# Patient Record
Sex: FEMALE | Race: WHITE | NOT HISPANIC OR LATINO
[De-identification: names, ages, dates, MRNs, and addresses within clinical notes are randomized per-mention and may not be internally consistent; named-entity substitution may affect disease eponyms.]

---

## 2020-07-13 PROBLEM — Z00.00 ENCOUNTER FOR PREVENTIVE HEALTH EXAMINATION: Status: ACTIVE | Noted: 2020-07-13

## 2020-07-14 ENCOUNTER — APPOINTMENT (OUTPATIENT)
Dept: DISASTER EMERGENCY | Facility: CLINIC | Age: 75
End: 2020-07-14

## 2020-07-15 VITALS
DIASTOLIC BLOOD PRESSURE: 79 MMHG | OXYGEN SATURATION: 97 % | HEART RATE: 67 BPM | RESPIRATION RATE: 16 BRPM | SYSTOLIC BLOOD PRESSURE: 166 MMHG | TEMPERATURE: 97 F

## 2020-07-15 LAB — SARS-COV-2 N GENE NPH QL NAA+PROBE: NOT DETECTED

## 2020-07-15 NOTE — H&P ADULT - NSHPLABSRESULTS_GEN_ALL_CORE
14.5   6.94  )-----------( 307      ( 17 Jul 2020 09:58 )             44.9       07-17    143  |  101  |  16  ----------------------------<  271<H>  4.0   |  28  |  0.54    Ca    10.0      17 Jul 2020 09:58    TPro  7.9  /  Alb  4.3  /  TBili  0.4  /  DBili  x   /  AST  19  /  ALT  22  /  AlkPhos  171<H>  07-17      PT/INR - ( 17 Jul 2020 09:58 )   PT: 11.6 sec;   INR: 0.97          PTT - ( 17 Jul 2020 09:58 )  PTT:31.5 sec    CARDIAC MARKERS ( 17 Jul 2020 09:58 )  x     / x     / 207 U/L / x     / 3.5 ng/mL            EKG: NSR with Sinus Arrhythmia at 67 bpm. 0.5-1 mm J point elevation V2-V5. No T wave changes.

## 2020-07-15 NOTE — H&P ADULT - ASSESSMENT
76 yo F POOR HISTORIAN with PMHx known 3VCAD (diagnostic cath 8/2019 @ [place]), HTN, HLD, Uncontrolled Insulin Dependent DM II (Hgb A1C 11.8% on 7/17/2020), ? Anemia (hgb 9.8 8/2019 s/p 2 units PRBCs 8/2019), PAD s/p LSFA/Popliteal/TP trunk and Posterior Tibial PTA and stent 8/2019  (s/p peripheral angiogram @ [place] 5/2020 with "ballooning and stenting" per patient- on Eliquis (last dose "few days ago" as patient ran out of medication) ,  ? Osteomyelitis s/p AKA LLE 11/2019 who presents for peripheral angiogram due to patient's risk factors, known 3V CAD, and CCS Angina Class III Equivalent Symptoms.     ASA III               Mallampati III     Risks & benefits of procedure and alternative therapy have been explained to the patient including but not limited to: allergic reaction, bleeding w/possible need for blood transfusion, infection, renal and vascular compromise, limb damage, arrhythmia, stroke, vessel dissection/perforation, Myocardial infarction, emergent CABG. Informed consent obtained and in chart.     Hgb/HCT 14.5/44.9 -normal. Patient denies bleeding, BRBPR, melena, hematuria, hematemesis. ASA  mg PO x 1 given prior to procedure. 76 yo F POOR HISTORIAN with PMHx known 3VCAD (diagnostic cath 8/2019 @ [place]), HTN, HLD, Uncontrolled Insulin Dependent DM II (Hgb A1C 11.8% on 7/17/2020), ? Anemia (hgb 9.8 8/2019 s/p 2 units PRBCs 8/2019), PAD s/p LSFA/Popliteal/TP trunk and Posterior Tibial PTA and stent 8/2019  (s/p peripheral angiogram @ [place] 5/2020 with "ballooning and stenting" per patient- on Eliquis (last dose "few days ago" as patient ran out of medication) ,  ? Osteomyelitis s/p AKA LLE 11/2019 who presents for peripheral angiogram due to patient's risk factors, known 3V CAD, and CCS Angina Class III Equivalent Symptoms.     ASA III               Mallampati III     Risks & benefits of procedure and alternative therapy have been explained to the patient including but not limited to: allergic reaction, bleeding w/possible need for blood transfusion, infection, renal and vascular compromise, limb damage, arrhythmia, stroke, vessel dissection/perforation, Myocardial infarction, emergent CABG. Informed consent obtained and in chart.     Hgb/HCT 14.5/44.9 -normal. Patient denies bleeding, BRBPR, melena, hematuria, hematemesis. ASA  mg PO x 1 given prior to procedure. No Plavix load pre-cath as patient likely for CABG given known 3V CAD and uncontrolled Insulin Dependent DM Type II

## 2020-07-15 NOTE — H&P ADULT - HISTORY OF PRESENT ILLNESS
COVID testing [facility] 7/14 Results Negative in HIE  Cardiologist: Dr. Gene Steiner , Dr. Sandra  Pharmacy: [facility] on [address] [facility]  Escort: [employer] transportation    76 yo F PMHx known 3VCAD (diagnostic cath 8/2019 @ [place]), HTN, HLD, DM II, anemia? (hgb 9.8 8/2019 s/p 2 units PRBCs 8/2019), PAD (s/p peripheral angiogram @ [facility] 5/2020 with "ballooning and stenting") ?osteomyelitis ("infection" s/p amputation of LLE above the knee presenting to Dr. Sandra for routine follow up. She does not report any chest pain, SOB, palpitations, dizziness, syncope, orthopnea. Does however report RLE dependent edema.     In light of pt's risk factors, known 3VCAD, she now presents to [facility] for cardiac catheterization with possible intervention if clinically indicated.     Cardiac cath @ [place] 8/2019: LM normal. RCA prox mild diffuse disease mid 70-80%. MAD prox mild disease, mid 60-70%, D1 70-80%. LCX prox 70-80%. EF 60%.    Peripheral angiogram 8/2019: LLE angio. Left SFA/poploteal?TOP trunk and PT artery balloon angioplasty and stenting. COVID testing [facility] 7/14 Results Negative in HIE  Cardiologist: Dr. Gene Steiner , Dr. Sandra  Pharmacy: [facility] on [address] [facility]  Escort:  transportation  Phone call cut off - pt may be on Eliquis. Voicemail left with instructions to hold Eliquis up until cath.     74 yo F PMHx known 3VCAD (diagnostic cath 8/2019 @ [place]), HTN, HLD, DM II, anemia? (hgb 9.8 8/2019 s/p 2 units PRBCs 8/2019), PAD (s/p peripheral angiogram @ [facility] 5/2020 with "ballooning and stenting") ?osteomyelitis ("infection" s/p amputation of LLE above the knee presenting to Dr. Sandra for routine follow up. She does not report any chest pain, SOB, palpitations, dizziness, syncope, orthopnea. Does however report RLE dependent edema.     In light of pt's risk factors, known 3VCAD, she now presents to [facility] for cardiac catheterization with possible intervention if clinically indicated.     Cardiac cath @ [place] 8/2019: LM normal. RCA prox mild diffuse disease mid 70-80%. MAD prox mild disease, mid 60-70%, D1 70-80%. LCX prox 70-80%. EF 60%.    Peripheral angiogram 8/2019: LLE angio. Left SFA/poploteal?TOP trunk and PT artery balloon angioplasty and stenting. COVID testing [facility] 7/14 Results Negative in HIE  Cardiologist: Dr. Gene Steiner , Dr. Sandra  Pharmacy: [facility] on [address] [facility]  Escort: [employer] transportation  Phone call cut off - pt may be on Eliquis. Voicemail left with instructions to hold Eliquis up until cath.     74 yo F PMHx known 3VCAD (diagnostic cath 8/2019 @ [place]), HTN, HLD, DM II, anemia? (hgb 9.8 8/2019 s/p 2 units PRBCs 8/2019), PAD s/p LSFA/Popliteal/TP trunk and Posterior Tibial PTA and stent (s/p peripheral angiogram @ [place] 5/2020 with "ballooning and stenting") ?osteomyelitis ("infection" s/p amputation of LLE above the knee presenting to Dr. Sandra for routine follow up. She does not report any chest pain, SOB, palpitations, dizziness, syncope, orthopnea. Does however report RLE dependent edema.     In light of pt's risk factors, known 3VCAD, she now presents to [facility] for cardiac catheterization with possible intervention if clinically indicated.     Cardiac cath @ [place] 8/2019: LM normal. RCA prox mild diffuse disease mid 70-80%. LAD prox mild disease, mid 60-70%, D1 70-80%. LCX prox 70-80%. EF 60%.    Peripheral angiogram 8/2019: LLE angio. Left SFA/popliteal?TP trunk and PT artery balloon angioplasty and stenting. COVID testing [facility] 7/14 Results Negative in HIE  Cardiologist: Dr. Gene Steiner , Dr. Sandra  Pharmacy: [facility] on [address] [facility]  Escort: [employer] transportation    74 yo F POOR HISTORIAN with PMHx known 3VCAD (diagnostic cath 8/2019 @ [place]), HTN, HLD, Uncontrolled Insulin Dependent DM II (Hgb A1C 11.8% on 7/17/2020), ? Anemia (hgb 9.8 8/2019 s/p 2 units PRBCs 8/2019), PAD s/p LSFA/Popliteal/TP trunk and Posterior Tibial PTA and stent 8/2019  (s/p peripheral angiogram @ [place] 5/2020 with "ballooning and stenting" per patient- on Eliquis (last dose "few days ago" as patient ran out of medication) ,  ? Osteomyelitis s/p AKA LLE 11/2019 who presented to cardiologist for follow-up. Patient reports SOB after climbing 1-2 flights of stairs resolved with rest ( CCS Angina Class III Equivalent Symptoms). She denies chest pain, palpitations, dizziness, syncope, orthopnea, PND. Does however report RLE dependent edema. Upon further questioning she denies fever, chills, cough, contact with known sick contacts or individuals with Covid 19, recent travel, loss of taste or smell.     In light of pt's risk factors, known 3VCAD, CCS Angina Class III Equivalent Symptoms she now presents to [facility] for cardiac catheterization with possible intervention if clinically indicated.     Cardiac cath @ [place] 8/2019: LM normal. RCA prox mild diffuse disease mid 70-80%. LAD prox mild disease, mid 60-70%, D1 70-80%. LCX prox 70-80%. EF 60%.    Peripheral angiogram 8/2019: LLE angio. Left SFA/popliteal?TP trunk and PT artery balloon angioplasty and stenting.

## 2020-07-15 NOTE — H&P ADULT - NSICDXPASTMEDICALHX_GEN_ALL_CORE_FT
PAST MEDICAL HISTORY:  CAD (coronary artery disease)     Diabetes mellitus     History of osteomyelitis     HLD (hyperlipidemia)     HTN (hypertension)     PAD (peripheral artery disease)

## 2020-07-17 ENCOUNTER — RESULT REVIEW (OUTPATIENT)
Age: 75
End: 2020-07-17

## 2020-07-17 ENCOUNTER — OUTPATIENT (OUTPATIENT)
Dept: OUTPATIENT SERVICES | Facility: HOSPITAL | Age: 75
LOS: 1 days | Discharge: NURSING, REHAB OR LONG-TERM CARE | End: 2020-07-17
Payer: MEDICARE

## 2020-07-17 VITALS
SYSTOLIC BLOOD PRESSURE: 166 MMHG | RESPIRATION RATE: 16 BRPM | OXYGEN SATURATION: 97 % | HEART RATE: 67 BPM | DIASTOLIC BLOOD PRESSURE: 79 MMHG | TEMPERATURE: 97 F

## 2020-07-17 LAB
A1C WITH ESTIMATED AVERAGE GLUCOSE RESULT: 11.8 % — HIGH (ref 4–5.6)
ALBUMIN SERPL ELPH-MCNC: 4.3 G/DL — SIGNIFICANT CHANGE UP (ref 3.3–5)
ALP SERPL-CCNC: 171 U/L — HIGH (ref 40–120)
ALT FLD-CCNC: 22 U/L — SIGNIFICANT CHANGE UP (ref 10–45)
ANION GAP SERPL CALC-SCNC: 14 MMOL/L — SIGNIFICANT CHANGE UP (ref 5–17)
APPEARANCE UR: CLEAR — SIGNIFICANT CHANGE UP
APTT BLD: 31.5 SEC — SIGNIFICANT CHANGE UP (ref 27.5–35.5)
AST SERPL-CCNC: 19 U/L — SIGNIFICANT CHANGE UP (ref 10–40)
BASOPHILS # BLD AUTO: 0.02 K/UL — SIGNIFICANT CHANGE UP (ref 0–0.2)
BASOPHILS NFR BLD AUTO: 0.3 % — SIGNIFICANT CHANGE UP (ref 0–2)
BILIRUB SERPL-MCNC: 0.4 MG/DL — SIGNIFICANT CHANGE UP (ref 0.2–1.2)
BILIRUB UR-MCNC: NEGATIVE — SIGNIFICANT CHANGE UP
BLD GP AB SCN SERPL QL: NEGATIVE — SIGNIFICANT CHANGE UP
BUN SERPL-MCNC: 16 MG/DL — SIGNIFICANT CHANGE UP (ref 7–23)
CALCIUM SERPL-MCNC: 10 MG/DL — SIGNIFICANT CHANGE UP (ref 8.4–10.5)
CHLORIDE SERPL-SCNC: 101 MMOL/L — SIGNIFICANT CHANGE UP (ref 96–108)
CHOLEST SERPL-MCNC: 172 MG/DL — SIGNIFICANT CHANGE UP (ref 10–199)
CK MB CFR SERPL CALC: 3.5 NG/ML — SIGNIFICANT CHANGE UP (ref 0–6.7)
CK SERPL-CCNC: 207 U/L — HIGH (ref 25–170)
CO2 SERPL-SCNC: 28 MMOL/L — SIGNIFICANT CHANGE UP (ref 22–31)
COLOR SPEC: YELLOW — SIGNIFICANT CHANGE UP
CREAT SERPL-MCNC: 0.54 MG/DL — SIGNIFICANT CHANGE UP (ref 0.5–1.3)
DIFF PNL FLD: ABNORMAL
EOSINOPHIL # BLD AUTO: 0.08 K/UL — SIGNIFICANT CHANGE UP (ref 0–0.5)
EOSINOPHIL NFR BLD AUTO: 1.2 % — SIGNIFICANT CHANGE UP (ref 0–6)
ESTIMATED AVERAGE GLUCOSE: 292 MG/DL — HIGH (ref 68–114)
GLUCOSE BLDC GLUCOMTR-MCNC: 151 MG/DL — HIGH (ref 70–99)
GLUCOSE BLDC GLUCOMTR-MCNC: 242 MG/DL — HIGH (ref 70–99)
GLUCOSE SERPL-MCNC: 271 MG/DL — HIGH (ref 70–99)
GLUCOSE UR QL: NEGATIVE — SIGNIFICANT CHANGE UP
HCT VFR BLD CALC: 44.9 % — SIGNIFICANT CHANGE UP (ref 34.5–45)
HDLC SERPL-MCNC: 50 MG/DL — SIGNIFICANT CHANGE UP
HGB BLD-MCNC: 14.5 G/DL — SIGNIFICANT CHANGE UP (ref 11.5–15.5)
IMM GRANULOCYTES NFR BLD AUTO: 0.3 % — SIGNIFICANT CHANGE UP (ref 0–1.5)
INR BLD: 0.97 — SIGNIFICANT CHANGE UP (ref 0.88–1.16)
KETONES UR-MCNC: NEGATIVE — SIGNIFICANT CHANGE UP
LEUKOCYTE ESTERASE UR-ACNC: NEGATIVE — SIGNIFICANT CHANGE UP
LIPID PNL WITH DIRECT LDL SERPL: 104 MG/DL — HIGH
LYMPHOCYTES # BLD AUTO: 1.65 K/UL — SIGNIFICANT CHANGE UP (ref 1–3.3)
LYMPHOCYTES # BLD AUTO: 23.8 % — SIGNIFICANT CHANGE UP (ref 13–44)
MCHC RBC-ENTMCNC: 26.2 PG — LOW (ref 27–34)
MCHC RBC-ENTMCNC: 32.3 GM/DL — SIGNIFICANT CHANGE UP (ref 32–36)
MCV RBC AUTO: 81.2 FL — SIGNIFICANT CHANGE UP (ref 80–100)
MONOCYTES # BLD AUTO: 0.33 K/UL — SIGNIFICANT CHANGE UP (ref 0–0.9)
MONOCYTES NFR BLD AUTO: 4.8 % — SIGNIFICANT CHANGE UP (ref 2–14)
NEUTROPHILS # BLD AUTO: 4.84 K/UL — SIGNIFICANT CHANGE UP (ref 1.8–7.4)
NEUTROPHILS NFR BLD AUTO: 69.6 % — SIGNIFICANT CHANGE UP (ref 43–77)
NITRITE UR-MCNC: NEGATIVE — SIGNIFICANT CHANGE UP
NRBC # BLD: 0 /100 WBCS — SIGNIFICANT CHANGE UP (ref 0–0)
PH UR: 7 — SIGNIFICANT CHANGE UP (ref 5–8)
PLATELET # BLD AUTO: 307 K/UL — SIGNIFICANT CHANGE UP (ref 150–400)
POTASSIUM SERPL-MCNC: 4 MMOL/L — SIGNIFICANT CHANGE UP (ref 3.5–5.3)
POTASSIUM SERPL-SCNC: 4 MMOL/L — SIGNIFICANT CHANGE UP (ref 3.5–5.3)
PROT SERPL-MCNC: 7.9 G/DL — SIGNIFICANT CHANGE UP (ref 6–8.3)
PROT UR-MCNC: 100 MG/DL
PROTHROM AB SERPL-ACNC: 11.6 SEC — SIGNIFICANT CHANGE UP (ref 10.6–13.6)
RBC # BLD: 5.53 M/UL — HIGH (ref 3.8–5.2)
RBC # FLD: 13.5 % — SIGNIFICANT CHANGE UP (ref 10.3–14.5)
RH IG SCN BLD-IMP: POSITIVE — SIGNIFICANT CHANGE UP
SODIUM SERPL-SCNC: 143 MMOL/L — SIGNIFICANT CHANGE UP (ref 135–145)
SP GR SPEC: 1.01 — SIGNIFICANT CHANGE UP (ref 1–1.03)
TOTAL CHOLESTEROL/HDL RATIO MEASUREMENT: 3.4 RATIO — SIGNIFICANT CHANGE UP (ref 3.3–7.1)
TRIGL SERPL-MCNC: 89 MG/DL — SIGNIFICANT CHANGE UP (ref 10–149)
TSH SERPL-MCNC: 2.24 UIU/ML — SIGNIFICANT CHANGE UP (ref 0.35–4.94)
UROBILINOGEN FLD QL: 0.2 E.U./DL — SIGNIFICANT CHANGE UP
WBC # BLD: 6.94 K/UL — SIGNIFICANT CHANGE UP (ref 3.8–10.5)
WBC # FLD AUTO: 6.94 K/UL — SIGNIFICANT CHANGE UP (ref 3.8–10.5)

## 2020-07-17 PROCEDURE — 93458 L HRT ARTERY/VENTRICLE ANGIO: CPT

## 2020-07-17 PROCEDURE — 83036 HEMOGLOBIN GLYCOSYLATED A1C: CPT

## 2020-07-17 PROCEDURE — 93306 TTE W/DOPPLER COMPLETE: CPT

## 2020-07-17 PROCEDURE — 80061 LIPID PANEL: CPT

## 2020-07-17 PROCEDURE — C1769: CPT

## 2020-07-17 PROCEDURE — 94150 VITAL CAPACITY TEST: CPT

## 2020-07-17 PROCEDURE — 71045 X-RAY EXAM CHEST 1 VIEW: CPT | Mod: 26

## 2020-07-17 PROCEDURE — 84443 ASSAY THYROID STIM HORMONE: CPT

## 2020-07-17 PROCEDURE — 93306 TTE W/DOPPLER COMPLETE: CPT | Mod: 26

## 2020-07-17 PROCEDURE — 71045 X-RAY EXAM CHEST 1 VIEW: CPT

## 2020-07-17 PROCEDURE — C1887: CPT

## 2020-07-17 PROCEDURE — 36415 COLL VENOUS BLD VENIPUNCTURE: CPT

## 2020-07-17 PROCEDURE — 99205 OFFICE O/P NEW HI 60 MIN: CPT

## 2020-07-17 PROCEDURE — 93880 EXTRACRANIAL BILAT STUDY: CPT

## 2020-07-17 PROCEDURE — 82962 GLUCOSE BLOOD TEST: CPT

## 2020-07-17 PROCEDURE — 85730 THROMBOPLASTIN TIME PARTIAL: CPT

## 2020-07-17 PROCEDURE — C1894: CPT

## 2020-07-17 PROCEDURE — 85610 PROTHROMBIN TIME: CPT

## 2020-07-17 PROCEDURE — 81001 URINALYSIS AUTO W/SCOPE: CPT

## 2020-07-17 PROCEDURE — 85025 COMPLETE CBC W/AUTO DIFF WBC: CPT

## 2020-07-17 PROCEDURE — 86850 RBC ANTIBODY SCREEN: CPT

## 2020-07-17 PROCEDURE — 82553 CREATINE MB FRACTION: CPT

## 2020-07-17 PROCEDURE — 80053 COMPREHEN METABOLIC PANEL: CPT

## 2020-07-17 PROCEDURE — 93458 L HRT ARTERY/VENTRICLE ANGIO: CPT | Mod: 26

## 2020-07-17 PROCEDURE — 82550 ASSAY OF CK (CPK): CPT

## 2020-07-17 PROCEDURE — 94010 BREATHING CAPACITY TEST: CPT | Mod: 26

## 2020-07-17 PROCEDURE — 86901 BLOOD TYPING SEROLOGIC RH(D): CPT

## 2020-07-17 PROCEDURE — 93880 EXTRACRANIAL BILAT STUDY: CPT | Mod: 26

## 2020-07-17 RX ORDER — SODIUM CHLORIDE 9 MG/ML
500 INJECTION INTRAMUSCULAR; INTRAVENOUS; SUBCUTANEOUS
Refills: 0 | Status: DISCONTINUED | OUTPATIENT
Start: 2020-07-17 | End: 2020-07-17

## 2020-07-17 RX ORDER — ASPIRIN/CALCIUM CARB/MAGNESIUM 324 MG
325 TABLET ORAL ONCE
Refills: 0 | Status: COMPLETED | OUTPATIENT
Start: 2020-07-17 | End: 2020-07-17

## 2020-07-17 RX ORDER — CHLORHEXIDINE GLUCONATE 213 G/1000ML
1 SOLUTION TOPICAL ONCE
Refills: 0 | Status: DISCONTINUED | OUTPATIENT
Start: 2020-07-17 | End: 2020-07-17

## 2020-07-17 RX ORDER — INSULIN LISPRO 100/ML
VIAL (ML) SUBCUTANEOUS ONCE
Refills: 0 | Status: COMPLETED | OUTPATIENT
Start: 2020-07-17 | End: 2020-07-17

## 2020-07-17 RX ADMIN — Medication 4: at 10:17

## 2020-07-17 RX ADMIN — Medication 325 MILLIGRAM(S): at 11:38

## 2020-07-17 NOTE — H&P ADULT - HISTORY OF PRESENT ILLNESS
74 yo F POOR HISTORIAN with PMHx known 3VCAD (diagnostic cath 8/2019 @ [place]), HTN, HLD, Uncontrolled Insulin Dependent DM II (Hgb A1C 11.8% on 7/17/2020), PAD s/p LSFA/Popliteal/TP trunk and Posterior Tibial PTA and stent 8/2019  (s/p peripheral angiogram @ [place] 5/2020 with "ballooning and stenting" per patient- on Eliquis, Osteomyelitis s/p AKA LLE 11/2019 who presented to cardiologist Dr. Yepez for follow-up. Patient reports SOB after climbing 1-2 flights of stairs resolved with rest ( CCS Angina Class III Equivalent Symptoms). She denies chest pain, palpitations, dizziness, syncope, orthopnea, PND. Does however report RLE dependent edema..In light of pt's risk factors, known 3VCAD, CCS Angina Class III Equivalent Symptoms, she is now sp cardiac cath 7/17/20, revealing severe 3VCAD: dLM 40-50% (largely Ca++), pLAD 70% (largely Ca++), mid/distalLAD minor plaque, ostialD1 70% (medium sized vessel), ostialLCx 80% (largely Ca++), OM1/OM2 minor plaque (med size), pRCA 60% (large dominant), mRCA 70%, dRCA minor plaque, PDA/PLS minor plaque, EF 65%. Patient consulted by Dr. Tavarez for CABG. She presents today for elective surgery.

## 2020-07-17 NOTE — CONSULT NOTE ADULT - ASSESSMENT
74 y/o Female with PMHx of known 3VCAD (diagnostic cath 8/2019 @ [place]), HTN, HLD, Uncontrolled IDDMII with A1C 11.8, and PAD s/p Left SFA/popliteal?TP trunk and PT artery balloon angioplasty and stenting, and Osteomyelitis now s/p AKA LLE 11/2019, s/p RLE peripheral angiogram at [place] 5/2020 with "ballooning and stenting" per patient, on Eliquis, who presented to her cardiologist for follow up. Patient reports chronic intermittent RLE edema, denies chest pain, SOB, decreased exercise tolerance, orthopnea, dizziness, N/V/D, fevers or chills, hx of AMI, CVA, melena or hematemesis. Patient referred to [facility] for cardiac catheterization 7/17/20 revealing severe 3VCAD: dLM 40-50% (largely Ca++), pLAD 70% (largely Ca++), mid/distalLAD minor plaque, ostialD1 70% (medium sized vessel), ostialLCx 80% (largely Ca++), OM1/OM2 minor plaque (med size), pRCA 60% (large dominant), mRCA 70%, dRCA minor plaque, PDA/PLS minor plaque, EF 65%. Cardiothoracic Surgery, Dr. Tavarez, consulted for surgical evaluation for possible CABG.     Plan:  Problem 1: CAD  - Case discussed with Dr. Tavarez  - Please ensure the following studies are completed prior to OR date: CBC, CMP, Coags, Lipid Panel, TSH, Hemoglobin A1c, Pro-BNP, Cardiac Enzymes, Type & Screen x 2,  Room air ABG, UA, Carotid US, TTE, CXR Pa/Lat, EKG, Bedside spirometry   - Plan is for discharge home per primary team and return to [facility] on Wednesday 7/22/2020 for CABG.  - NPO p MN on 7/21/20  - Outpatient PA to provide patient with instructions for SDA.  - Patient can call us with any questions #[phone].  - Will need to hold Eliquis prior to OR date.     Problem 2: IDDMII  - A1C 11.8  - Endocrine consult.   - dietary modifications  - close follow up  - Goal blood glucose level post surgery <150    Problem 3: HTN  - Continue imdur, and amlodipine  - Hold ACEi/ARB prior to OR to prevent periop vasodilation.  - consider BB?  (caution with HR low 60s).    Problem 4: HLD  - Continue statin    I have reviewed clinical labs tests and reports, radiology tests and reports, as well as old patient medical records, and discussed with the refering physician.

## 2020-07-17 NOTE — H&P ADULT - ASSESSMENT
75 year old female with NYHA class III symptoms and triple vessel CAD  consulted by Dr. Tavarez.  reviewed the cardiac cath imaging and reports with the patient  and discussed the case with Dr. Yepez. Dr. Tavarez discussed the risks, benefits and alternatives to surgery. Risks include but not limited to death, heart attack, bleeding, stroke, kidney problems and infection. He quoted a 1-2% operative mortality and complication risk.   Dr. Tavarez feels the patient will benefit and is a candidate for a CABG. All questions were addressed and patient agrees to proceed with surgery.     Plan  Eliquis stopped effective 7/17/20  3 soaps given with instructions   NPO after midnight  COVID test at home 7/20/20  SDA 7/22/20 CABG 75 year old female with NYHA class III symptoms and triple vessel CAD  consulted by Dr. Tavarez.  reviewed the cardiac cath imaging and reports with the patient  and discussed the case with Dr. Yepez. Dr. Tavarez discussed the risks, benefits and alternatives to surgery. Risks include but not limited to death, heart attack, bleeding, stroke, kidney problems and infection. He quoted a 1-2% operative mortality and complication risk.   Dr. Tavarez feels the patient will benefit and is a candidate for a CABG. All questions were addressed and patient agrees to proceed with surgery.     Plan  PST completed including TTE, CXR, labs, EKG  Vein conduits examined by YINKA Wilkins stopped effective 7/17/20  3 soaps given with instructions   NPO after midnight  COVID test at home 7/20/20  SDA 7/22/20 CABG

## 2020-07-17 NOTE — CONSULT NOTE ADULT - SUBJECTIVE AND OBJECTIVE BOX
**Incomplete note    Surgeon: Dr. Tavarez    Requesting Physician: Dr. Yepez    HISTORY OF PRESENT ILLNESS (Need 4):  75y Female    PAST MEDICAL & SURGICAL HISTORY:  History of osteomyelitis  CAD (coronary artery disease)  PAD (peripheral artery disease)  Diabetes mellitus  HLD (hyperlipidemia)  HTN (hypertension)      MEDICATIONS  (STANDING):    MEDICATIONS  (PRN):      Allergies    No Known Allergies    Intolerances        SOCIAL HISTORY:  Smoker:  YES / NO        PACK YEARS:                         WHEN QUIT?  ETOH use:  YES / NO               FREQUENCY / QUANTITY:  Ilicit Drug use:  YES / NO  Occupation:  Assisted device use (Cane / Walker):  Live with:    FAMILY HISTORY:      Review of Systems (Need 10):  CONSTITUTIONAL: Denies fevers / chills, sweats, fatigue, weight loss, weight gain                                       NEURO:  Denies parathesias, seizures, syncope, confusion                                                                                  EYES:  Denies blurry vision, discharge, pain, loss of vision                                                                                    ENMT:  Denies difficulty hearing, vertigo, dysphagia, epistaxis, recent dental work                                       CV:  Denies chest pain, palpitations, RIBEIRO, orthopnea                                                                                           RESPIRATORY:  Denies wWheezing, SOB, cough / sputum, hemoptysis                                                               GI:  Denies nausea, vomiting, diarrhea, constipation, melena                                                                          : Denies hematuria, dysuria, urgency, incontinence                                                                                          MUSKULOSKELETAL:  Denies arthritis, joint swelling, muscle weakness                                                             SKIN/BREAST:  Denies rash, itching, hair loss, masses                                                                                              PSYCH:  Denies depression, anxiety, suicidal ideation                                                                                                HEME/LYMPH:  Denies bruises easily, enlarged lymph nodes, tender lymph nodes                                          ENDOCRINE:  Denies cold intolerance, heat intolerance, polydipsia                                                                      Vital Signs Last 24 Hrs  T(C): --  T(F): --  HR: --  BP: --  BP(mean): --  RR: --  SpO2: --    Physical Exam (Need 8)  CONSTITUTIONAL:                                                                          WNL  NEURO:                                                                                             WNL                      EYES:                                                                                                  WNL  ENMT:                                                                                                WNL  CV:                                                                                                      WNL  RESPIRATORY:                                                                                  WNL  GI:                                                                                                       WNL  : JAEGER + / -                                                                                 WNL  MUSKULOSKELETAL:                                                                       WNL  SKIN / BREAST:                                                                                 WNL                                                          LABS:                        14.5   6.94  )-----------( 307      ( 17 Jul 2020 09:58 )             44.9     07-17    143  |  101  |  16  ----------------------------<  271<H>  4.0   |  28  |  0.54    Ca    10.0      17 Jul 2020 09:58    TPro  7.9  /  Alb  4.3  /  TBili  0.4  /  DBili  x   /  AST  19  /  ALT  22  /  AlkPhos  171<H>  07-17    PT/INR - ( 17 Jul 2020 09:58 )   PT: 11.6 sec;   INR: 0.97          PTT - ( 17 Jul 2020 09:58 )  PTT:31.5 sec    CARDIAC MARKERS ( 17 Jul 2020 09:58 )  x     / x     / 207 U/L / x     / 3.5 ng/mL          RADIOLOGY & ADDITIONAL STUDIES:  CAROTID U/S:    CXR:    CT Scan:    EKG:    TTE / FRANCES:    Cardiac Cath: Surgeon: Dr. Tavarez    Requesting Physician: Dr. Yepez    HISTORY OF PRESENT ILLNESS (Need 4):  74 y/o Female with PMHx of known 3VCAD (diagnostic cath 8/2019 @ [place]), HTN, HLD, Uncontrolled IDDMII with A1C 11.8, and PAD s/p Left SFA/popliteal?TP trunk and PT artery balloon angioplasty and stenting, and Osteomyelitis now s/p AKA LLE 11/2019, s/p RLE peripheral angiogram at [place] 5/2020 with "ballooning and stenting" per patient, on Eliquis, who presented to her cardiologist for follow up. Patient reports chronic intermittent RLE edema, denies chest pain, SOB, decreased exercise tolerance, orthopnea, dizziness, N/V/D, fevers or chills, hx of AMI, CVA, melena or hematemesis. Patient referred to [facility] for cardiac catheterization 7/17/20 revealing severe 3VCAD: dLM 40-50% (largely Ca++), pLAD 70% (largely Ca++), mid/distalLAD minor plaque, ostialD1 70% (medium sized vessel), ostialLCx 80% (largely Ca++), OM1/OM2 minor plaque (med size), pRCA 60% (large dominant), mRCA 70%, dRCA minor plaque, PDA/PLS minor plaque, EF 65%. Cardiothoracic Surgery, Dr. Tavarez, consulted for surgical evaluation for possible CABG.     Of note, patient has prosthesis at home for LLE, however 2/2 covid pandemic, her rehabilitation was disrupted and therefore does not use prosthesis often. She lives at home with her daughter and grandchildren. Patient states she does not leave her home unless going to see a doctor. She otherwise moves around her home via wheelchair, gets in/out by herself and shower's independently.       PAST MEDICAL & SURGICAL HISTORY:  History of osteomyelitis  CAD (coronary artery disease)  PAD (peripheral artery disease)  Diabetes mellitus  HLD (hyperlipidemia)  HTN (hypertension)      MEDICATIONS  (STANDING):    MEDICATIONS  (PRN):      Allergies    No Known Allergies    Intolerances        SOCIAL HISTORY:  Smoker:   NO      ETOH use:   NO                Ilicit Drug use:  NO  Occupation: none  Assisted device use (Cane / Walker): Uses wheelchair, see HPI  Live with: daughter and grandchildren.     FAMILY HISTORY:      Review of Systems (Need 10):  CONSTITUTIONAL: Denies fevers / chills, sweats, fatigue, weight loss, weight gain                                       NEURO:  Denies parathesias, seizures, syncope, confusion                                                                                  EYES:  Denies blurry vision, discharge, pain, loss of vision                                                                                    ENMT:  Denies difficulty hearing, vertigo, dysphagia, epistaxis, recent dental work                                       CV:  +RLE chronic edema, Denies chest pain, palpitations, RIBEIRO, orthopnea                                                                                           RESPIRATORY:  Denies wWheezing, SOB, cough / sputum, hemoptysis                                                               GI:  Denies nausea, vomiting, diarrhea, constipation, melena                                                                          : Denies hematuria, dysuria, urgency, incontinence                                                                                          MUSKULOSKELETAL:  Denies arthritis, joint swelling, muscle weakness                                                             SKIN/BREAST:  Denies rash, itching, hair loss, masses                                                                                              PSYCH:  Denies depression, anxiety, suicidal ideation                                                                                                HEME/LYMPH:  Denies bruises easily, enlarged lymph nodes, tender lymph nodes                                          ENDOCRINE:  Denies cold intolerance, heat intolerance, polydipsia                                                                      Vital Signs Last 24 Hrs  T(F): 98.2F  HR: 67bpm  BP: 166/79mmHg  RR: 16  SpO2: 97% on RA    Physical Exam (Need 8)  CONSTITUTIONAL:                                                                          WNL  NEURO:                                                                                             WNL                      EYES:                                                                                                  WNL  ENMT:                                                                                                WNL  CV:                                                                                                      WNL  RESPIRATORY:                                                                                  WNL  GI:                                                                                                       WNL  : JAEGER + / -                                                                                 WNL  MUSKULOSKELETAL:                                                                       WNL  SKIN / BREAST:                                                                                 WNL                                                          LABS:                        14.5   6.94  )-----------( 307      ( 17 Jul 2020 09:58 )             44.9     07-17    143  |  101  |  16  ----------------------------<  271<H>  4.0   |  28  |  0.54    Ca    10.0      17 Jul 2020 09:58    TPro  7.9  /  Alb  4.3  /  TBili  0.4  /  DBili  x   /  AST  19  /  ALT  22  /  AlkPhos  171<H>  07-17    PT/INR - ( 17 Jul 2020 09:58 )   PT: 11.6 sec;   INR: 0.97          PTT - ( 17 Jul 2020 09:58 )  PTT:31.5 sec    CARDIAC MARKERS ( 17 Jul 2020 09:58 )  x     / x     / 207 U/L / x     / 3.5 ng/mL          RADIOLOGY & ADDITIONAL STUDIES:  CAROTID U/S:    CXR:    CT Scan:    EKG:    TTE / FRANCES:    Cardiac Cath: Surgeon: Dr. Tavarez    Requesting Physician: Dr. Yepez    HISTORY OF PRESENT ILLNESS (Need 4):  74 y/o Female with PMHx of known 3VCAD (diagnostic cath 8/2019 @ [place]), HTN, HLD, Uncontrolled IDDMII with A1C 11.8, and PAD s/p Left SFA/popliteal?TP trunk and PT artery balloon angioplasty and stenting, and Osteomyelitis now s/p AKA LLE 11/2019, s/p RLE peripheral angiogram at [place] 5/2020 with "ballooning and stenting" per patient, on Eliquis, who presented to her cardiologist for follow up. Patient reports chronic intermittent RLE edema, denies chest pain, SOB, decreased exercise tolerance, orthopnea, dizziness, N/V/D, fevers or chills, hx of AMI, CVA, melena or hematemesis. Patient referred to [facility] for cardiac catheterization 7/17/20 revealing severe 3VCAD: dLM 40-50% (largely Ca++), pLAD 70% (largely Ca++), mid/distalLAD minor plaque, ostialD1 70% (medium sized vessel), ostialLCx 80% (largely Ca++), OM1/OM2 minor plaque (med size), pRCA 60% (large dominant), mRCA 70%, dRCA minor plaque, PDA/PLS minor plaque, EF 65%. Cardiothoracic Surgery, Dr. Tavarez, consulted for surgical evaluation for possible CABG.     Of note, patient has prosthesis at home for LLE, however 2/2 covid pandemic, her rehabilitation was disrupted and therefore does not use prosthesis often. She lives at home with her daughter and grandchildren. Patient states she does not leave her home unless going to see a doctor. She otherwise moves around her home via wheelchair, gets in/out by herself and shower's independently.       PAST MEDICAL & SURGICAL HISTORY:  History of osteomyelitis  CAD (coronary artery disease)  PAD (peripheral artery disease)  Diabetes mellitus  HLD (hyperlipidemia)  HTN (hypertension)      MEDICATIONS  (STANDING):    MEDICATIONS  (PRN):      Allergies    No Known Allergies    Intolerances        SOCIAL HISTORY:  Smoker:   NO      ETOH use:   NO                Ilicit Drug use:  NO  Occupation: none  Assisted device use (Cane / Walker): Uses wheelchair, see HPI  Live with: daughter and grandchildren.     FAMILY HISTORY:      Review of Systems (Need 10):  CONSTITUTIONAL: Denies fevers / chills, sweats, fatigue, weight loss, weight gain                                       NEURO:  Denies parathesias, seizures, syncope, confusion                                                                                  EYES:  Denies blurry vision, discharge, pain, loss of vision                                                                                    ENMT:  Denies difficulty hearing, vertigo, dysphagia, epistaxis, recent dental work                                       CV:  +RLE chronic edema, Denies chest pain, palpitations, RIBEIRO, orthopnea                                                                                           RESPIRATORY:  Denies wWheezing, SOB, cough / sputum, hemoptysis                                                               GI:  Denies nausea, vomiting, diarrhea, constipation, melena                                                                          : Denies hematuria, dysuria, urgency, incontinence                                                                                          MUSKULOSKELETAL:  Denies arthritis, joint swelling, muscle weakness                                                             SKIN/BREAST:  Denies rash, itching, hair loss, masses                                                                                              PSYCH:  Denies depression, anxiety, suicidal ideation                                                                                                HEME/LYMPH:  Denies bruises easily, enlarged lymph nodes, tender lymph nodes                                          ENDOCRINE:  Denies cold intolerance, heat intolerance, polydipsia                                                                      Vital Signs Last 24 Hrs  T(F): 98.2F  HR: 67bpm  BP: 166/79mmHg  RR: 16  SpO2: 97% on RA    Physical Exam:  CONSTITUTIONAL: Female lying comfortably in bed, in no acute distress.   NEURO: CN II-XII grossly intact, A&Ox3, no focal deficits.                 EYES: PERRLA, EOMI, no conjunctival injection  ENMT: Moist mucous membranes, no erythema, no lymphadenopathy, trachea midline.   CV: S1S2, RRR, no murmurs appreciated on exam.   RESPIRATORY: Clear to auscultation bilaterally, no wheezes rales or rhonchi.   GI: Abdomen soft, non tender, non distended, +bowel sounds.   : Deferred  MUSKULOSKELETAL: +LLE AKA, stump healed. RLE trace peripheral edema and thickening of skin distally  SKIN / BREAST: No obvious rashes  VASCULAR: RLE DP/PT pulses faint to palpation +dopplerable. R femoral artery 2+, L femoral artery faint to palpation, bilateral radial 1+                                                        LABS:                        14.5   6.94  )-----------( 307      ( 17 Jul 2020 09:58 )             44.9     07-17    143  |  101  |  16  ----------------------------<  271<H>  4.0   |  28  |  0.54    Ca    10.0      17 Jul 2020 09:58    TPro  7.9  /  Alb  4.3  /  TBili  0.4  /  DBili  x   /  AST  19  /  ALT  22  /  AlkPhos  171<H>  07-17    PT/INR - ( 17 Jul 2020 09:58 )   PT: 11.6 sec;   INR: 0.97          PTT - ( 17 Jul 2020 09:58 )  PTT:31.5 sec    CARDIAC MARKERS ( 17 Jul 2020 09:58 )  x     / x     / 207 U/L / x     / 3.5 ng/mL          RADIOLOGY & ADDITIONAL STUDIES:  CAROTID U/S: pending    CXR: pending    CT Scan: not indicated    EKG: in paper chart    TTE / FRANCES: pending     Cardiac Cath: 7/17/20 revealing severe 3VCAD: dLM 40-50% (largely Ca++), pLAD 70% (largely Ca++), mid/distalLAD minor plaque, ostialD1 70% (medium sized vessel), ostialLCx 80% (largely Ca++), OM1/OM2 minor plaque (med size), pRCA 60% (large dominant), mRCA 70%, dRCA minor plaque, PDA/PLS minor plaque, EF 65%.

## 2020-07-17 NOTE — H&P ADULT - NSHPLABSRESULTS_GEN_ALL_CORE
Hgb A1C =  creat = .5411.8  hct= 44.9  hgb= 14.5  plt= 307  WBC= 6.94  INR= 0.97  tot alb=4.3  tot bili=0.4    TSH=     pft:  Carotids:    Chest xray:   EKG:     Echo:    Cardiac Cath 7/17/20: severe 3VCAD: dLM 40-50% (largely Ca++), pLAD 70% (largely Ca++), mid/distalLAD minor plaque, ostialD1 70% (medium sized vessel), ostialLCx 80% (largely Ca++), OM1/OM2 minor plaque (med size), pRCA 60% (large dominant), mRCA 70%, dRCA minor plaque, PDA/PLS minor plaque, EF 65%. Hgb A1C =  creat = .5411.8  hct= 44.9  hgb= 14.5  plt= 307  WBC= 6.94  INR= 0.97  tot alb=4.3  tot bili=0.4      pft:  Carotids: no significant stenosis     Chest xray:   EKG:     Echo: nl EF, no valvular disease    Cardiac Cath 7/17/20: severe 3VCAD: dLM 40-50% (largely Ca++), pLAD 70% (largely Ca++), mid/distalLAD minor plaque, ostialD1 70% (medium sized vessel), ostialLCx 80% (largely Ca++), OM1/OM2 minor plaque (med size), pRCA 60% (large dominant), mRCA 70%, dRCA minor plaque, PDA/PLS minor plaque, EF 65%. Hgb A1C =  creat = .5411.8  hct= 44.9  hgb= 14.5  plt= 307  WBC= 6.94  INR= 0.97  tot alb=4.3  tot bili=0.4      pft:  Carotids: no significant stenosis     Chest xray:   Lungs clear. No infiltrate or pleural effusion. No pneumothorax. No acute bone abnormality.    EKG:     Echo: nl EF, no valvular disease    Cardiac Cath 7/17/20: severe 3VCAD: dLM 40-50% (largely Ca++), pLAD 70% (largely Ca++), mid/distalLAD minor plaque, ostialD1 70% (medium sized vessel), ostialLCx 80% (largely Ca++), OM1/OM2 minor plaque (med size), pRCA 60% (large dominant), mRCA 70%, dRCA minor plaque, PDA/PLS minor plaque, EF 65%.

## 2020-07-17 NOTE — PROGRESS NOTE ADULT - SUBJECTIVE AND OBJECTIVE BOX
Interventional Cardiology PA SDA Discharge Note    Patient without complaints. Ambulated and voided without difficulties    Afebrile, VSS    Ext: Right Radial: no hematoma, no bleeding, dressing; C/D/I    Pulses: intact RAD/DP/PT to baseline     A/P:  76 yo F POOR HISTORIAN with PMHx known 3VCAD (diagnostic cath 8/2019 @ [place]), HTN, HLD, Uncontrolled Insulin Dependent DM II (Hgb A1C 11.8% on 7/17/2020), ? Anemia (hgb 9.8 8/2019 s/p 2 units PRBCs 8/2019), PAD s/p LSFA/Popliteal/TP trunk and Posterior Tibial PTA and stent 8/2019  (s/p peripheral angiogram @ [place] 5/2020 with "ballooning and stenting" per patient- on Eliquis (last dose "few days ago" as patient ran out of medication) ,  ? Osteomyelitis s/p AKA LLE 11/2019 who presented to cardiologist for follow-up. Patient reports SOB after climbing 1-2 flights of stairs resolved with rest ( CCS Angina Class III Equivalent Symptoms). She denies chest pain, palpitations, dizziness, syncope, orthopnea, PND. Does however report RLE dependent edema. Upon further questioning she denies fever, chills, cough, contact with known sick contacts or individuals with Covid 19, recent travel, loss of taste or smell. In light of pt's risk factors, known 3VCAD, CCS Angina Class III Equivalent Symptoms she now presents to [facility] for cardiac catheterization with possible intervention if clinically indicated.   Pt s/p diagnostic cardiac catheterization 7/17 revealing severe 3VCAD: dLM 40-50% (largely Ca++), pLAD 70% (largely Ca++), mid/distalLAD minor plaque, ostialD1 70% (medium sized vessel), ostialLCx 80% (largely Ca++), OM1/OM2 minor plaque (med size), pRCA 60% (large dominant), mRCA 70%, dRCA minor plaque, PDA/PLS minor plaque, EF 65%, EDP 13, access R radial dstat. CTS Dr. Juares consulted for 3VCAD with plan for pt to return for SDA on 7/22/20 for CABG.    1.	Stable for discharge as per attending Dr. Yepez after bed rest, pt voids, wrist stable and 30 minutes of ambulation.  2.	Follow-up with PMD/Cardiologist Lucho in 1-2 weeks  3.	Discharged forms signed and copies in chart Interventional Cardiology PA SDA Discharge Note    Patient without complaints. Ambulated and voided without difficulties    Afebrile, VSS    Ext: Right Radial: no hematoma, no bleeding, dressing; C/D/I    Pulses: intact RAD/DP/PT to baseline     A/P:  76 yo F POOR HISTORIAN with PMHx known 3VCAD (diagnostic cath 8/2019 @ [place]), HTN, HLD, Uncontrolled Insulin Dependent DM II (Hgb A1C 11.8% on 7/17/2020), ? Anemia (hgb 9.8 8/2019 s/p 2 units PRBCs 8/2019), PAD s/p LSFA/Popliteal/TP trunk and Posterior Tibial PTA and stent 8/2019  (s/p peripheral angiogram @ [place] 5/2020 with "ballooning and stenting" per patient- on Eliquis (last dose "few days ago" as patient ran out of medication) ,  ? Osteomyelitis s/p AKA LLE 11/2019 who presented to cardiologist for follow-up. Patient reports SOB after climbing 1-2 flights of stairs resolved with rest ( CCS Angina Class III Equivalent Symptoms). She denies chest pain, palpitations, dizziness, syncope, orthopnea, PND. Does however report RLE dependent edema. Upon further questioning she denies fever, chills, cough, contact with known sick contacts or individuals with Covid 19, recent travel, loss of taste or smell. In light of pt's risk factors, known 3VCAD, CCS Angina Class III Equivalent Symptoms she now presents to [facility] for cardiac catheterization with possible intervention if clinically indicated.   Pt s/p diagnostic cardiac catheterization 7/17 revealing severe 3VCAD: dLM 40-50% (largely Ca++), pLAD 70% (largely Ca++), mid/distalLAD minor plaque, ostialD1 70% (medium sized vessel), ostialLCx 80% (largely Ca++), OM1/OM2 minor plaque (med size), pRCA 60% (large dominant), mRCA 70%, dRCA minor plaque, PDA/PLS minor plaque, EF 65%, EDP 13, access R radial dstat. CTS Dr. Tavarez consulted for 3VCAD with plan for pt to return for SDA on 7/22/20 for CABG.    1.	Stable for discharge as per attending Dr. Yepez after bed rest, pt voids, wrist stable and 30 minutes of ambulation.  2.	Follow-up with PMD/Cardiologist Lucho in 1-2 weeks  3.	Discharged forms signed and copies in chart Interventional Cardiology PA SDA Discharge Note    Patient without complaints. Ambulated and voided without difficulties    Afebrile, VSS    Ext: Right Radial: no hematoma, no bleeding, dressing; C/D/I    Pulses: intact RAD/DP/PT to baseline     A/P:  74 yo F POOR HISTORIAN with PMHx known 3VCAD (diagnostic cath 8/2019 @ [place]), HTN, HLD, Uncontrolled Insulin Dependent DM II (Hgb A1C 11.8% on 7/17/2020), ? Anemia (hgb 9.8 8/2019 s/p 2 units PRBCs 8/2019), PAD s/p LSFA/Popliteal/TP trunk and Posterior Tibial PTA and stent 8/2019  (s/p peripheral angiogram @ [place] 5/2020 with "ballooning and stenting" per patient- on Eliquis (last dose "few days ago" as patient ran out of medication) ,  ? Osteomyelitis s/p AKA LLE 11/2019 who presented to cardiologist for follow-up. Patient reports SOB after climbing 1-2 flights of stairs resolved with rest ( CCS Angina Class III Equivalent Symptoms). She denies chest pain, palpitations, dizziness, syncope, orthopnea, PND. Does however report RLE dependent edema. Upon further questioning she denies fever, chills, cough, contact with known sick contacts or individuals with Covid 19, recent travel, loss of taste or smell. In light of pt's risk factors, known 3VCAD, CCS Angina Class III Equivalent Symptoms she now presents to [facility] for cardiac catheterization with possible intervention if clinically indicated.     Pt s/p diagnostic cardiac catheterization 7/17 revealing severe 3VCAD: dLM 40-50% (largely Ca++), pLAD 70% (largely Ca++), mid/distalLAD minor plaque, ostialD1 70% (medium sized vessel), ostialLCx 80% (largely Ca++), OM1/OM2 minor plaque (med size), pRCA 60% (large dominant), mRCA 70%, dRCA minor plaque, PDA/PLS minor plaque, EF 65%, EDP 13, access R radial dstat. CTS Dr. Tavarez consulted for 3VCAD with plan for pt to return for SDA on 7/22/20 for CABG.    1.	Stable for discharge as per attending Dr. Yepez after bed rest, pt voids, wrist stable and 30 minutes of ambulation.  2.	Follow-up with PMD/Cardiologist Lucho in 1-2 weeks  3.	Discharged forms signed and copies in chart

## 2020-07-17 NOTE — H&P ADULT - SPO2 (%)
Received a RF request from pharmacy 7/31 for Anoro Ellipta, writer authorized med since last OV note states pt was taking Anoro and Symbicort on and off.   Message left on nurse line re meds - is pt to be taking both as they are the same type of medications?   97

## 2020-07-17 NOTE — CONSULT NOTE ADULT - ATTENDING COMMENTS
74yo F severe PVD s/p LAKA, SOB, CAD 3V on cath. Indication for surgical coronary revascularization. Case discussed with the patient and her daughter who agree to proceed. Risks, benefits and concerns all addressed, all questions answered. Risk of the procedure is 2-3%.

## 2020-07-21 ENCOUNTER — TRANSCRIPTION ENCOUNTER (OUTPATIENT)
Age: 75
End: 2020-07-21

## 2020-07-22 ENCOUNTER — APPOINTMENT (OUTPATIENT)
Dept: CARDIOTHORACIC SURGERY | Facility: HOSPITAL | Age: 75
End: 2020-07-22

## 2020-07-22 ENCOUNTER — INPATIENT (INPATIENT)
Facility: HOSPITAL | Age: 75
LOS: 8 days | Discharge: NURSING, REHAB OR LONG-TERM CARE | DRG: 228 | End: 2020-07-31
Attending: THORACIC SURGERY (CARDIOTHORACIC VASCULAR SURGERY) | Admitting: THORACIC SURGERY (CARDIOTHORACIC VASCULAR SURGERY)
Payer: MEDICARE

## 2020-07-22 DIAGNOSIS — E11.51 TYPE 2 DIABETES MELLITUS WITH DIABETIC PERIPHERAL ANGIOPATHY WITHOUT GANGRENE: ICD-10-CM

## 2020-07-22 DIAGNOSIS — Z89.612 ACQUIRED ABSENCE OF LEFT LEG ABOVE KNEE: ICD-10-CM

## 2020-07-22 DIAGNOSIS — I50.32 CHRONIC DIASTOLIC (CONGESTIVE) HEART FAILURE: ICD-10-CM

## 2020-07-22 DIAGNOSIS — I25.10 ATHEROSCLEROTIC HEART DISEASE OF NATIVE CORONARY ARTERY WITHOUT ANGINA PECTORIS: ICD-10-CM

## 2020-07-22 DIAGNOSIS — E78.5 HYPERLIPIDEMIA, UNSPECIFIED: ICD-10-CM

## 2020-07-22 DIAGNOSIS — I11.0 HYPERTENSIVE HEART DISEASE WITH HEART FAILURE: ICD-10-CM

## 2020-07-22 DIAGNOSIS — R09.02 HYPOXEMIA: ICD-10-CM

## 2020-07-22 DIAGNOSIS — G89.12 ACUTE POST-THORACOTOMY PAIN: ICD-10-CM

## 2020-07-22 DIAGNOSIS — I97.710 INTRAOPERATIVE CARDIAC ARREST DURING CARDIAC SURGERY: ICD-10-CM

## 2020-07-22 DIAGNOSIS — Z79.82 LONG TERM (CURRENT) USE OF ASPIRIN: ICD-10-CM

## 2020-07-22 DIAGNOSIS — R79.89 OTHER SPECIFIED ABNORMAL FINDINGS OF BLOOD CHEMISTRY: ICD-10-CM

## 2020-07-22 DIAGNOSIS — J90 PLEURAL EFFUSION, NOT ELSEWHERE CLASSIFIED: ICD-10-CM

## 2020-07-22 DIAGNOSIS — I50.33 ACUTE ON CHRONIC DIASTOLIC (CONGESTIVE) HEART FAILURE: ICD-10-CM

## 2020-07-22 DIAGNOSIS — I95.81 POSTPROCEDURAL HYPOTENSION: ICD-10-CM
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
## 2020-07-22 DIAGNOSIS — D62 ACUTE POSTHEMORRHAGIC ANEMIA: ICD-10-CM

## 2020-07-22 DIAGNOSIS — I25.84 CORONARY ATHEROSCLEROSIS DUE TO CALCIFIED CORONARY LESION: ICD-10-CM

## 2020-07-22 DIAGNOSIS — I25.119 ATHEROSCLEROTIC HEART DISEASE OF NATIVE CORONARY ARTERY WITH UNSPECIFIED ANGINA PECTORIS: ICD-10-CM

## 2020-07-22 DIAGNOSIS — I44.2 ATRIOVENTRICULAR BLOCK, COMPLETE: ICD-10-CM

## 2020-07-22 DIAGNOSIS — Z95.828 PRESENCE OF OTHER VASCULAR IMPLANTS AND GRAFTS: ICD-10-CM

## 2020-07-22 DIAGNOSIS — K81.9 CHOLECYSTITIS, UNSPECIFIED: ICD-10-CM

## 2020-07-22 DIAGNOSIS — E87.79 OTHER FLUID OVERLOAD: ICD-10-CM

## 2020-07-22 DIAGNOSIS — Z79.4 LONG TERM (CURRENT) USE OF INSULIN: ICD-10-CM

## 2020-07-22 DIAGNOSIS — I48.91 UNSPECIFIED ATRIAL FIBRILLATION: ICD-10-CM

## 2020-07-22 DIAGNOSIS — E11.65 TYPE 2 DIABETES MELLITUS WITH HYPERGLYCEMIA: ICD-10-CM

## 2020-07-22 DIAGNOSIS — J98.11 ATELECTASIS: ICD-10-CM

## 2020-07-22 LAB
ALBUMIN SERPL ELPH-MCNC: 1.5 G/DL — LOW (ref 3.3–5)
ALBUMIN SERPL ELPH-MCNC: 2.6 G/DL — LOW (ref 3.3–5)
ALBUMIN SERPL ELPH-MCNC: 2.9 G/DL — LOW (ref 3.3–5)
ALP SERPL-CCNC: 62 U/L — SIGNIFICANT CHANGE UP (ref 40–120)
ALP SERPL-CCNC: 64 U/L — SIGNIFICANT CHANGE UP (ref 40–120)
ALP SERPL-CCNC: 72 U/L — SIGNIFICANT CHANGE UP (ref 40–120)
ALT FLD-CCNC: 12 U/L — SIGNIFICANT CHANGE UP (ref 10–45)
ALT FLD-CCNC: 13 U/L — SIGNIFICANT CHANGE UP (ref 10–45)
ALT FLD-CCNC: 16 U/L — SIGNIFICANT CHANGE UP (ref 10–45)
ANION GAP SERPL CALC-SCNC: 11 MMOL/L — SIGNIFICANT CHANGE UP (ref 5–17)
ANION GAP SERPL CALC-SCNC: 11 MMOL/L — SIGNIFICANT CHANGE UP (ref 5–17)
ANION GAP SERPL CALC-SCNC: 9 MMOL/L — SIGNIFICANT CHANGE UP (ref 5–17)
APTT BLD: 27 SEC — LOW (ref 27.5–35.5)
APTT BLD: 38.8 SEC — HIGH (ref 27.5–35.5)
APTT BLD: 40.5 SEC — HIGH (ref 27.5–35.5)
AST SERPL-CCNC: 44 U/L — HIGH (ref 10–40)
AST SERPL-CCNC: 49 U/L — HIGH (ref 10–40)
AST SERPL-CCNC: 72 U/L — HIGH (ref 10–40)
BASOPHILS # BLD AUTO: 0 K/UL — SIGNIFICANT CHANGE UP (ref 0–0.2)
BASOPHILS NFR BLD AUTO: 0 % — SIGNIFICANT CHANGE UP (ref 0–2)
BILIRUB SERPL-MCNC: 0.5 MG/DL — SIGNIFICANT CHANGE UP (ref 0.2–1.2)
BILIRUB SERPL-MCNC: 1 MG/DL — SIGNIFICANT CHANGE UP (ref 0.2–1.2)
BILIRUB SERPL-MCNC: 1.3 MG/DL — HIGH (ref 0.2–1.2)
BUN SERPL-MCNC: 11 MG/DL — SIGNIFICANT CHANGE UP (ref 7–23)
CALCIUM SERPL-MCNC: 6.7 MG/DL — LOW (ref 8.4–10.5)
CALCIUM SERPL-MCNC: 7.7 MG/DL — LOW (ref 8.4–10.5)
CALCIUM SERPL-MCNC: 8.6 MG/DL — SIGNIFICANT CHANGE UP (ref 8.4–10.5)
CHLORIDE SERPL-SCNC: 108 MMOL/L — SIGNIFICANT CHANGE UP (ref 96–108)
CHLORIDE SERPL-SCNC: 110 MMOL/L — HIGH (ref 96–108)
CHLORIDE SERPL-SCNC: 110 MMOL/L — HIGH (ref 96–108)
CO2 SERPL-SCNC: 22 MMOL/L — SIGNIFICANT CHANGE UP (ref 22–31)
CO2 SERPL-SCNC: 23 MMOL/L — SIGNIFICANT CHANGE UP (ref 22–31)
CO2 SERPL-SCNC: 24 MMOL/L — SIGNIFICANT CHANGE UP (ref 22–31)
CREAT SERPL-MCNC: 0.39 MG/DL — LOW (ref 0.5–1.3)
CREAT SERPL-MCNC: 0.39 MG/DL — LOW (ref 0.5–1.3)
CREAT SERPL-MCNC: 0.4 MG/DL — LOW (ref 0.5–1.3)
EOSINOPHIL # BLD AUTO: 0 K/UL — SIGNIFICANT CHANGE UP (ref 0–0.5)
EOSINOPHIL NFR BLD AUTO: 0 % — SIGNIFICANT CHANGE UP (ref 0–6)
GAS PNL BLDA: SIGNIFICANT CHANGE UP
GLUCOSE BLDC GLUCOMTR-MCNC: 131 MG/DL — HIGH (ref 70–99)
GLUCOSE BLDC GLUCOMTR-MCNC: 175 MG/DL — HIGH (ref 70–99)
GLUCOSE BLDC GLUCOMTR-MCNC: 215 MG/DL — HIGH (ref 70–99)
GLUCOSE BLDC GLUCOMTR-MCNC: 225 MG/DL — HIGH (ref 70–99)
GLUCOSE BLDC GLUCOMTR-MCNC: 240 MG/DL — HIGH (ref 70–99)
GLUCOSE BLDC GLUCOMTR-MCNC: 244 MG/DL — HIGH (ref 70–99)
GLUCOSE BLDC GLUCOMTR-MCNC: 292 MG/DL — HIGH (ref 70–99)
GLUCOSE SERPL-MCNC: 165 MG/DL — HIGH (ref 70–99)
GLUCOSE SERPL-MCNC: 220 MG/DL — HIGH (ref 70–99)
GLUCOSE SERPL-MCNC: 286 MG/DL — HIGH (ref 70–99)
HCT VFR BLD CALC: 25.5 % — LOW (ref 34.5–45)
HCT VFR BLD CALC: 29.8 % — LOW (ref 34.5–45)
HCT VFR BLD CALC: 32.5 % — LOW (ref 34.5–45)
HGB BLD-MCNC: 10 G/DL — LOW (ref 11.5–15.5)
HGB BLD-MCNC: 11 G/DL — LOW (ref 11.5–15.5)
HGB BLD-MCNC: 8.7 G/DL — LOW (ref 11.5–15.5)
INR BLD: 1.26 — HIGH (ref 0.88–1.16)
INR BLD: 1.41 — HIGH (ref 0.88–1.16)
LACTATE SERPL-SCNC: 2.2 MMOL/L — HIGH (ref 0.5–2)
LACTATE SERPL-SCNC: 2.6 MMOL/L — HIGH (ref 0.5–2)
LACTATE SERPL-SCNC: 4.7 MMOL/L — CRITICAL HIGH (ref 0.5–2)
LYMPHOCYTES # BLD AUTO: 0.1 K/UL — LOW (ref 1–3.3)
LYMPHOCYTES # BLD AUTO: 0.9 % — LOW (ref 13–44)
MAGNESIUM SERPL-MCNC: 2.2 MG/DL — SIGNIFICANT CHANGE UP (ref 1.6–2.6)
MAGNESIUM SERPL-MCNC: 2.3 MG/DL — SIGNIFICANT CHANGE UP (ref 1.6–2.6)
MAGNESIUM SERPL-MCNC: 2.5 MG/DL — SIGNIFICANT CHANGE UP (ref 1.6–2.6)
MCHC RBC-ENTMCNC: 27.6 PG — SIGNIFICANT CHANGE UP (ref 27–34)
MCHC RBC-ENTMCNC: 27.7 PG — SIGNIFICANT CHANGE UP (ref 27–34)
MCHC RBC-ENTMCNC: 27.8 PG — SIGNIFICANT CHANGE UP (ref 27–34)
MCHC RBC-ENTMCNC: 33.6 GM/DL — SIGNIFICANT CHANGE UP (ref 32–36)
MCHC RBC-ENTMCNC: 33.8 GM/DL — SIGNIFICANT CHANGE UP (ref 32–36)
MCHC RBC-ENTMCNC: 34.1 GM/DL — SIGNIFICANT CHANGE UP (ref 32–36)
MCV RBC AUTO: 81.2 FL — SIGNIFICANT CHANGE UP (ref 80–100)
MCV RBC AUTO: 82.3 FL — SIGNIFICANT CHANGE UP (ref 80–100)
MCV RBC AUTO: 82.3 FL — SIGNIFICANT CHANGE UP (ref 80–100)
MONOCYTES # BLD AUTO: 0 K/UL — SIGNIFICANT CHANGE UP (ref 0–0.9)
MONOCYTES NFR BLD AUTO: 0 % — LOW (ref 2–14)
NEUTROPHILS # BLD AUTO: 10.5 K/UL — HIGH (ref 1.8–7.4)
NEUTROPHILS NFR BLD AUTO: 93.7 % — HIGH (ref 43–77)
NRBC # BLD: 0 /100 WBCS — SIGNIFICANT CHANGE UP (ref 0–0)
NRBC # BLD: 0 /100 WBCS — SIGNIFICANT CHANGE UP (ref 0–0)
PHOSPHATE SERPL-MCNC: 3 MG/DL — SIGNIFICANT CHANGE UP (ref 2.5–4.5)
PHOSPHATE SERPL-MCNC: 3.4 MG/DL — SIGNIFICANT CHANGE UP (ref 2.5–4.5)
PHOSPHATE SERPL-MCNC: 3.5 MG/DL — SIGNIFICANT CHANGE UP (ref 2.5–4.5)
PLATELET # BLD AUTO: 84 K/UL — LOW (ref 150–400)
PLATELET # BLD AUTO: 91 K/UL — LOW (ref 150–400)
PLATELET # BLD AUTO: 94 K/UL — LOW (ref 150–400)
POTASSIUM SERPL-MCNC: 3.8 MMOL/L — SIGNIFICANT CHANGE UP (ref 3.5–5.3)
POTASSIUM SERPL-MCNC: 4.1 MMOL/L — SIGNIFICANT CHANGE UP (ref 3.5–5.3)
POTASSIUM SERPL-MCNC: 4.7 MMOL/L — SIGNIFICANT CHANGE UP (ref 3.5–5.3)
POTASSIUM SERPL-SCNC: 3.8 MMOL/L — SIGNIFICANT CHANGE UP (ref 3.5–5.3)
POTASSIUM SERPL-SCNC: 4.1 MMOL/L — SIGNIFICANT CHANGE UP (ref 3.5–5.3)
POTASSIUM SERPL-SCNC: 4.7 MMOL/L — SIGNIFICANT CHANGE UP (ref 3.5–5.3)
PROT SERPL-MCNC: 3.1 G/DL — LOW (ref 6–8.3)
PROT SERPL-MCNC: 4.2 G/DL — LOW (ref 6–8.3)
PROT SERPL-MCNC: 4.6 G/DL — LOW (ref 6–8.3)
PROTHROM AB SERPL-ACNC: 14.9 SEC — HIGH (ref 10.6–13.6)
PROTHROM AB SERPL-ACNC: 16.7 SEC — HIGH (ref 10.6–13.6)
RBC # BLD: 3.14 M/UL — LOW (ref 3.8–5.2)
RBC # BLD: 3.62 M/UL — LOW (ref 3.8–5.2)
RBC # BLD: 3.95 M/UL — SIGNIFICANT CHANGE UP (ref 3.8–5.2)
RBC # FLD: 14.3 % — SIGNIFICANT CHANGE UP (ref 10.3–14.5)
RBC # FLD: 14.5 % — SIGNIFICANT CHANGE UP (ref 10.3–14.5)
RBC # FLD: 14.8 % — HIGH (ref 10.3–14.5)
SODIUM SERPL-SCNC: 141 MMOL/L — SIGNIFICANT CHANGE UP (ref 135–145)
SODIUM SERPL-SCNC: 143 MMOL/L — SIGNIFICANT CHANGE UP (ref 135–145)
SODIUM SERPL-SCNC: 144 MMOL/L — SIGNIFICANT CHANGE UP (ref 135–145)
WBC # BLD: 10.89 K/UL — HIGH (ref 3.8–10.5)
WBC # BLD: 11.81 K/UL — HIGH (ref 3.8–10.5)
WBC # BLD: 9.78 K/UL — SIGNIFICANT CHANGE UP (ref 3.8–10.5)
WBC # FLD AUTO: 10.89 K/UL — HIGH (ref 3.8–10.5)
WBC # FLD AUTO: 11.81 K/UL — HIGH (ref 3.8–10.5)
WBC # FLD AUTO: 9.78 K/UL — SIGNIFICANT CHANGE UP (ref 3.8–10.5)

## 2020-07-22 PROCEDURE — 99292 CRITICAL CARE ADDL 30 MIN: CPT

## 2020-07-22 PROCEDURE — 33508 ENDOSCOPIC VEIN HARVEST: CPT | Mod: AS

## 2020-07-22 PROCEDURE — 71045 X-RAY EXAM CHEST 1 VIEW: CPT | Mod: 26

## 2020-07-22 PROCEDURE — 33517 CABG ARTERY-VEIN SINGLE: CPT

## 2020-07-22 PROCEDURE — 33533 CABG ARTERIAL SINGLE: CPT

## 2020-07-22 PROCEDURE — 33533 CABG ARTERIAL SINGLE: CPT | Mod: 80

## 2020-07-22 PROCEDURE — 33517 CABG ARTERY-VEIN SINGLE: CPT | Mod: 80

## 2020-07-22 PROCEDURE — 93010 ELECTROCARDIOGRAM REPORT: CPT

## 2020-07-22 PROCEDURE — 99291 CRITICAL CARE FIRST HOUR: CPT

## 2020-07-22 PROCEDURE — 33517 CABG ARTERY-VEIN SINGLE: CPT | Mod: AS

## 2020-07-22 PROCEDURE — 33533 CABG ARTERIAL SINGLE: CPT | Mod: AS

## 2020-07-22 RX ORDER — VASOPRESSIN 20 [USP'U]/ML
0.03 INJECTION INTRAVENOUS
Qty: 50 | Refills: 0 | Status: DISCONTINUED | OUTPATIENT
Start: 2020-07-22 | End: 2020-07-23

## 2020-07-22 RX ORDER — FENTANYL CITRATE 50 UG/ML
25 INJECTION INTRAVENOUS
Refills: 0 | Status: DISCONTINUED | OUTPATIENT
Start: 2020-07-22 | End: 2020-07-23

## 2020-07-22 RX ORDER — PROPOFOL 10 MG/ML
10 INJECTION, EMULSION INTRAVENOUS
Qty: 1000 | Refills: 0 | Status: DISCONTINUED | OUTPATIENT
Start: 2020-07-22 | End: 2020-07-23

## 2020-07-22 RX ORDER — INSULIN HUMAN 100 [IU]/ML
1 INJECTION, SOLUTION SUBCUTANEOUS
Qty: 50 | Refills: 0 | Status: DISCONTINUED | OUTPATIENT
Start: 2020-07-22 | End: 2020-07-23

## 2020-07-22 RX ORDER — DEXTROSE 50 % IN WATER 50 %
50 SYRINGE (ML) INTRAVENOUS
Refills: 0 | Status: DISCONTINUED | OUTPATIENT
Start: 2020-07-22 | End: 2020-07-27

## 2020-07-22 RX ORDER — HEPARIN SODIUM 5000 [USP'U]/ML
5000 INJECTION INTRAVENOUS; SUBCUTANEOUS EVERY 8 HOURS
Refills: 0 | Status: DISCONTINUED | OUTPATIENT
Start: 2020-07-22 | End: 2020-07-25

## 2020-07-22 RX ORDER — CEFAZOLIN SODIUM 1 G
2000 VIAL (EA) INJECTION EVERY 8 HOURS
Refills: 0 | Status: COMPLETED | OUTPATIENT
Start: 2020-07-22 | End: 2020-07-24

## 2020-07-22 RX ORDER — CHOLECALCIFEROL (VITAMIN D3) 125 MCG
1 CAPSULE ORAL
Qty: 0 | Refills: 0 | DISCHARGE

## 2020-07-22 RX ORDER — PROPOFOL 10 MG/ML
10 INJECTION, EMULSION INTRAVENOUS
Qty: 1000 | Refills: 0 | Status: DISCONTINUED | OUTPATIENT
Start: 2020-07-22 | End: 2020-07-22

## 2020-07-22 RX ORDER — POTASSIUM CHLORIDE 20 MEQ
20 PACKET (EA) ORAL ONCE
Refills: 0 | Status: COMPLETED | OUTPATIENT
Start: 2020-07-22 | End: 2020-07-22

## 2020-07-22 RX ORDER — ALBUMIN HUMAN 25 %
250 VIAL (ML) INTRAVENOUS ONCE
Refills: 0 | Status: COMPLETED | OUTPATIENT
Start: 2020-07-22 | End: 2020-07-22

## 2020-07-22 RX ORDER — CHLORHEXIDINE GLUCONATE 213 G/1000ML
5 SOLUTION TOPICAL EVERY 4 HOURS
Refills: 0 | Status: DISCONTINUED | OUTPATIENT
Start: 2020-07-22 | End: 2020-07-23

## 2020-07-22 RX ORDER — CHLORHEXIDINE GLUCONATE 213 G/1000ML
15 SOLUTION TOPICAL EVERY 12 HOURS
Refills: 0 | Status: DISCONTINUED | OUTPATIENT
Start: 2020-07-22 | End: 2020-07-23

## 2020-07-22 RX ORDER — MEPERIDINE HYDROCHLORIDE 50 MG/ML
25 INJECTION INTRAMUSCULAR; INTRAVENOUS; SUBCUTANEOUS ONCE
Refills: 0 | Status: DISCONTINUED | OUTPATIENT
Start: 2020-07-22 | End: 2020-07-22

## 2020-07-22 RX ORDER — DEXTROSE 50 % IN WATER 50 %
25 SYRINGE (ML) INTRAVENOUS
Refills: 0 | Status: DISCONTINUED | OUTPATIENT
Start: 2020-07-22 | End: 2020-07-27

## 2020-07-22 RX ORDER — ACETAMINOPHEN 500 MG
1000 TABLET ORAL ONCE
Refills: 0 | Status: COMPLETED | OUTPATIENT
Start: 2020-07-22 | End: 2020-07-23

## 2020-07-22 RX ORDER — NICARDIPINE HYDROCHLORIDE 30 MG/1
5 CAPSULE, EXTENDED RELEASE ORAL
Qty: 40 | Refills: 0 | Status: DISCONTINUED | OUTPATIENT
Start: 2020-07-22 | End: 2020-07-23

## 2020-07-22 RX ORDER — CHLORHEXIDINE GLUCONATE 213 G/1000ML
1 SOLUTION TOPICAL DAILY
Refills: 0 | Status: DISCONTINUED | OUTPATIENT
Start: 2020-07-22 | End: 2020-07-27

## 2020-07-22 RX ORDER — ATORVASTATIN CALCIUM 80 MG/1
40 TABLET, FILM COATED ORAL AT BEDTIME
Refills: 0 | Status: DISCONTINUED | OUTPATIENT
Start: 2020-07-22 | End: 2020-07-25

## 2020-07-22 RX ORDER — SODIUM CHLORIDE 9 MG/ML
1000 INJECTION INTRAMUSCULAR; INTRAVENOUS; SUBCUTANEOUS
Refills: 0 | Status: DISCONTINUED | OUTPATIENT
Start: 2020-07-22 | End: 2020-07-27

## 2020-07-22 RX ORDER — FAMOTIDINE 10 MG/ML
20 INJECTION INTRAVENOUS EVERY 12 HOURS
Refills: 0 | Status: DISCONTINUED | OUTPATIENT
Start: 2020-07-22 | End: 2020-07-23

## 2020-07-22 RX ORDER — DEXMEDETOMIDINE HYDROCHLORIDE IN 0.9% SODIUM CHLORIDE 4 UG/ML
0.2 INJECTION INTRAVENOUS
Qty: 200 | Refills: 0 | Status: DISCONTINUED | OUTPATIENT
Start: 2020-07-22 | End: 2020-07-23

## 2020-07-22 RX ORDER — CALCIUM GLUCONATE 100 MG/ML
2 VIAL (ML) INTRAVENOUS ONCE
Refills: 0 | Status: COMPLETED | OUTPATIENT
Start: 2020-07-22 | End: 2020-07-22

## 2020-07-22 RX ORDER — ASPIRIN/CALCIUM CARB/MAGNESIUM 324 MG
81 TABLET ORAL DAILY
Refills: 0 | Status: DISCONTINUED | OUTPATIENT
Start: 2020-07-22 | End: 2020-07-31

## 2020-07-22 RX ORDER — FENTANYL CITRATE 50 UG/ML
25 INJECTION INTRAVENOUS EVERY 6 HOURS
Refills: 0 | Status: DISCONTINUED | OUTPATIENT
Start: 2020-07-22 | End: 2020-07-22

## 2020-07-22 RX ADMIN — Medication 125 MILLILITER(S): at 17:18

## 2020-07-22 RX ADMIN — Medication 100 MILLIGRAM(S): at 21:39

## 2020-07-22 RX ADMIN — ATORVASTATIN CALCIUM 40 MILLIGRAM(S): 80 TABLET, FILM COATED ORAL at 23:58

## 2020-07-22 RX ADMIN — NICARDIPINE HYDROCHLORIDE 25 MG/HR: 30 CAPSULE, EXTENDED RELEASE ORAL at 19:18

## 2020-07-22 RX ADMIN — FAMOTIDINE 20 MILLIGRAM(S): 10 INJECTION INTRAVENOUS at 18:11

## 2020-07-22 RX ADMIN — INSULIN HUMAN 1 UNIT(S)/HR: 100 INJECTION, SOLUTION SUBCUTANEOUS at 18:07

## 2020-07-22 RX ADMIN — Medication 200 GRAM(S): at 19:36

## 2020-07-22 RX ADMIN — Medication 100 MILLIEQUIVALENT(S): at 21:40

## 2020-07-22 RX ADMIN — HEPARIN SODIUM 5000 UNIT(S): 5000 INJECTION INTRAVENOUS; SUBCUTANEOUS at 22:05

## 2020-07-22 RX ADMIN — CHLORHEXIDINE GLUCONATE 5 MILLILITER(S): 213 SOLUTION TOPICAL at 18:09

## 2020-07-22 RX ADMIN — Medication 200 GRAM(S): at 16:57

## 2020-07-22 RX ADMIN — CHLORHEXIDINE GLUCONATE 15 MILLILITER(S): 213 SOLUTION TOPICAL at 18:09

## 2020-07-22 RX ADMIN — PROPOFOL 3.6 MICROGRAM(S)/KG/MIN: 10 INJECTION, EMULSION INTRAVENOUS at 19:17

## 2020-07-22 RX ADMIN — CHLORHEXIDINE GLUCONATE 5 MILLILITER(S): 213 SOLUTION TOPICAL at 22:04

## 2020-07-22 NOTE — BRIEF OPERATIVE NOTE - COMMENTS
EF: EF: nl  Dr. Anaya first assisted for the entirety of the case, including but not limited to conduit harvest, cannulation, distal/proximal anastomoses, decannulation, and chest closure. EF: nl  I first assisted for the entirety of the case, including but not limited to conduit harvest, cannulation, distal/proximal anastomoses, decannulation, and chest closure.

## 2020-07-22 NOTE — PROGRESS NOTE ADULT - SUBJECTIVE AND OBJECTIVE BOX
CESARIO MARIN  MRN-[id number]  Patient is a 75y old  Female who presents with a chief complaint of CAD (22 Jul 2020 16:12)    HPI:  76 yo F POOR HISTORIAN with PMHx known 3VCAD (diagnostic cath 8/2019 @ [facility]), HTN, HLD, Uncontrolled Insulin Dependent DM II (Hgb A1C 11.8% on 7/17/2020), PAD s/p LSFA/Popliteal/TP trunk and Posterior Tibial PTA and stent 8/2019  (s/p peripheral angiogram @ [facility] 5/2020 with "ballooning and stenting" per patient- on Eliquis, Osteomyelitis s/p AKA LLE 11/2019 who presented to cardiologist Dr. Yepez for follow-up. Patient reports SOB after climbing 1-2 flights of stairs resolved with rest ( CCS Angina Class III Equivalent Symptoms). She denies chest pain, palpitations, dizziness, syncope, orthopnea, PND. Does however report RLE dependent edema..In light of pt's risk factors, known 3VCAD, CCS Angina Class III Equivalent Symptoms, she is now sp cardiac cath 7/17/20, revealing severe 3VCAD: dLM 40-50% (largely Ca++), pLAD 70% (largely Ca++), mid/distalLAD minor plaque, ostialD1 70% (medium sized vessel), ostialLCx 80% (largely Ca++), OM1/OM2 minor plaque (med size), pRCA 60% (large dominant), mRCA 70%, dRCA minor plaque, PDA/PLS minor plaque, EF 65%. Patient consulted by Dr. Tavarez for CABG. She presents today for elective surgery. (17 Jul 2020 15:26)      Surgery/Hospital course:    Today:    REVIEW OF SYSTEMS:  Gen: No fever  EYES/ENT: No visual changes;  No vertigo or throat pain   NECK: No pain   RES:  No shortness of breath or Cough  Chest: + incisional pain  CARD: No chest pain   GI: No abdominal pain  : No dysuria  NEURO: No weakness  SKIN: No itching, rashes     Physical Exam:  Vital Signs Last 24 Hrs  T(C): 36.1 (22 Jul 2020 22:41), Max: 36.1 (22 Jul 2020 19:00)  T(F): 96.9 (22 Jul 2020 22:41), Max: 97 (22 Jul 2020 19:00)  HR: 83 (22 Jul 2020 22:00) (77 - 83)  BP: --  BP(mean): --  RR: 12 (22 Jul 2020 22:00) (12 - 12)  SpO2: 100% (22 Jul 2020 22:00) (100% - 100%)  Gen:  Awake, alert   CNS: non focal 	  Neck: no JVD  RES : clear , no wheezing    Chest:   + chest tubes                     CVS: Regular  rhythm. Normal S1/S2  Abd: Soft, non-distended. Bowel sounds present.  Skin: No rash.  Ext:  no edema, A Line  PSY:  ============================I/O===========================   I&O's Detail    22 Jul 2020 07:01  -  22 Jul 2020 23:28  --------------------------------------------------------  IN:    insulin regular Infusion: 33 mL    IV PiggyBack: 450 mL    niCARdipine Infusion: 107.5 mL    Packed Red Blood Cells: 500 mL    propofol Infusion: 28.5 mL    sodium chloride 0.9%.: 70 mL  Total IN: 1189 mL    OUT:    Chest Tube: 160 mL    Chest Tube: 190 mL    Voided: 1010 mL  Total OUT: 1360 mL    Total NET: -171 mL        ============================ LABS =========================                        11.0   9.78  )-----------( 94       ( 22 Jul 2020 21:03 )             32.5     07-22    141  |  108  |  11  ----------------------------<  220<H>  3.8   |  22  |  0.39<L>    Ca    8.6      22 Jul 2020 21:03  Phos  3.5     07-22  Mg     2.2     07-22    TPro  4.6<L>  /  Alb  2.9<L>  /  TBili  1.3<H>  /  DBili  x   /  AST  72<H>  /  ALT  16  /  AlkPhos  72  07-22    LIVER FUNCTIONS - ( 22 Jul 2020 21:03 )  Alb: 2.9 g/dL / Pro: 4.6 g/dL / ALK PHOS: 72 U/L / ALT: 16 U/L / AST: 72 U/L / GGT: x           PT/INR - ( 22 Jul 2020 21:03 )   PT: 14.9 sec;   INR: 1.26          PTT - ( 22 Jul 2020 21:03 )  PTT:40.5 sec  ABG - ( 22 Jul 2020 21:03 )  pH, Arterial: 7.44  pH, Blood: x     /  pCO2: 33    /  pO2: 204   / HCO3: 22    / Base Excess: -1.6  /  SaO2: 99                  ======================Micro/Rad/Cardio=================  Culture: Reviewed   CXR: Reviewed  Echo:Reviewed  ======================================================  PAST MEDICAL & SURGICAL HISTORY:  History of osteomyelitis  CAD (coronary artery disease)  PAD (peripheral artery disease)  Diabetes mellitus  HLD (hyperlipidemia)  HTN (hypertension)    ====================ASSESMENT ==============  CNS:  RES:  CVS:  Hem:  Liu:  GI:  Endo:  ID:  Skin  Plan:  ====================== NEUROLOGY=====================  acetaminophen  IVPB .. 1000 milliGRAM(s) IV Intermittent once PRN Mild Pain (1 - 3)  dexMEDEtomidine Infusion 0.2 MICROgram(s)/kG/Hr (3 mL/Hr) IV Continuous <Continuous>  fentaNYL    Injectable 25 MICROGram(s) IV Push every 3 hours PRN Severe Pain (7 - 10)  propofol Infusion 10 MICROgram(s)/kG/Min (3.6 mL/Hr) IV Continuous <Continuous>    ==================== RESPIRATORY======================  Mechanical Ventilation:  Mode: AC/ CMV (Assist Control/ Continuous Mandatory Ventilation)  RR (machine): 12  TV (machine): 500  FiO2: 50  PEEP: 5  ITime: 1  MAP: 11  PIP: 25      ====================CARDIOVASCULAR==================  niCARdipine Infusion 5 mG/Hr (25 mL/Hr) IV Continuous <Continuous>    ===================HEMATOLOGIC/ONC ===================  aspirin enteric coated 81 milliGRAM(s) Oral daily  heparin   Injectable 5000 Unit(s) SubCutaneous every 8 hours    ===================== RENAL =========================  Cunningham for monitoring urine output    ==================== GASTROINTESTINAL===================  famotidine Injectable 20 milliGRAM(s) IV Push every 12 hours  sodium chloride 0.9%. 1000 milliLiter(s) (10 mL/Hr) IV Continuous <Continuous>    =======================    ENDOCRINE  =====================  atorvastatin 40 milliGRAM(s) Oral at bedtime  dextrose 50% Injectable 50 milliLiter(s) IV Push every 15 minutes  dextrose 50% Injectable 25 milliLiter(s) IV Push every 15 minutes  insulin regular Infusion 1 Unit(s)/Hr (1 mL/Hr) IV Continuous <Continuous>  vasopressin Infusion 0.033 Unit(s)/Min (2 mL/Hr) IV Continuous <Continuous>    ========================INFECTIOUS DISEASE================  ceFAZolin   IVPB 2000 milliGRAM(s) IV Intermittent every 8 hours    .crit

## 2020-07-22 NOTE — PROGRESS NOTE ADULT - SUBJECTIVE AND OBJECTIVE BOX
INTERVAL HPI/OVERNIGHT EVENTS:    OpDay: CABG x 3  EF normal   Cardiology Dr Yepez    74 yo Female Hx uncontrolled DM (HgA1c 11.8), 3v CAD, HTN, HLD, PAD s/p LSFA/Popliteal/TP trunk and Posterior Tibial PTA and stent 8/2019, Osteomyelitis s/p AKA LLE '19 with sxs SOB/RIBEIRO.    Cath 7/17: severe 3VCAD: dLM 40-50% (largely Ca++), pLAD 70% (largely Ca++), mid/distalLAD minor plaque, ostialD1 70% (medium sized vessel), ostialLCx 80% (largely Ca++), OM1/OM2 minor plaque (med size), pRCA 60% (large dominant), mRCA 70%, dRCA minor plaque, PDA/PLS minor plaque, EF 65%.     to OR today:   CABG x 3   intraop: 2 U pRBC  insulin requirements and infusion for hyperglycemia  paced - underlying rhythm - asystole with occ   arrived to ICU on Levo 0.04 and Vaso 2.       ICU Vital Signs Last 24 Hrs  T(C): --  T(F): --  HR: 77 (22 Jul 2020 16:05) (77 - 77)  BP: --  BP(mean): --  ABP: 128/56 (22 Jul 2020 16:05) (128/56 - 128/56)  ABP(mean): 83 (22 Jul 2020 16:05) (83 - 83)  RR: --  SpO2: 100% (22 Jul 2020 16:05) (100% - 100%)    Qtts:     I&O's Summary          Physical Exam    Heart  Lungs  Abd  Ext  Chest  Neuro  Skin    LABS:                  RADIOLOGY & ADDITIONAL STUDIES:    I have spent/provided stated minutes of critical care time to this patient: INTERVAL HPI/OVERNIGHT EVENTS:    OpDay: CABG x 3  EF normal   Cardiology Dr Yepez    74 yo Female Hx uncontrolled DM (HgA1c 11.8), 3v CAD, HTN, HLD, PAD s/p LSFA/Popliteal/TP trunk and Posterior Tibial PTA and stent 8/2019, Osteomyelitis s/p AKA LLE '19 with sxs SOB/RIBEIRO.    Cath 7/17: severe 3VCAD: dLM 40-50% (largely Ca++), pLAD 70% (largely Ca++), mid/distalLAD minor plaque, ostialD1 70% (medium sized vessel), ostialLCx 80% (largely Ca++), OM1/OM2 minor plaque (med size), pRCA 60% (large dominant), mRCA 70%, dRCA minor plaque, PDA/PLS minor plaque, EF 65%.     to OR today:   CABG x 3   intraop: 2 U pRBC  insulin requirements and infusion for hyperglycemia  paced - underlying rhythm - asystole with occ   arrived to ICU on Levo 0.04 and Vaso 2.       ICU Vital Signs Last 24 Hrs  HR: 77 (22 Jul 2020 16:05) (77 - 77) Av paced  ABP: 128/56 (22 Jul 2020 16:05) (128/56 - 128/56)  ABP(mean): 83 (22 Jul 2020 16:05) (83 - 83)  SpO2: 100% (22 Jul 2020 16:05) (100% - 100%)    Qtts:   Vaso 2  Levo 0.04    Physical Exam    Heart - regular (-)rub/gallop  Lungs - BS appreciated bilaterally (-)rub/gallop  Abd (+)BS soft NTND (-)r/r/g  Ext - warm to touch RLE - left AKA - stump clean and dry  Chest - op bandage in place   Neuro - pupils reactive to light; otherwise unable at this time  Skin - no rash     LABS: pending    RADIOLOGY & ADDITIONAL STUDIES:    I have spent/provided stated minutes of critical care time to this patient: 60 min INTERVAL HPI/OVERNIGHT EVENTS:    OpDay: CABG x 3  EF normal   Cardiology Dr Yepez    76 yo Female Hx uncontrolled DM (HgA1c 11.8), 3v CAD, HTN, HLD, PAD s/p LSFA/Popliteal/TP trunk and Posterior Tibial PTA and stent 8/2019, Osteomyelitis s/p AKA LLE '19 with sxs SOB/RIBEIRO.    Cath 7/17: severe 3VCAD: dLM 40-50% (largely Ca++), pLAD 70% (largely Ca++), mid/distalLAD minor plaque, ostialD1 70% (medium sized vessel), ostialLCx 80% (largely Ca++), OM1/OM2 minor plaque (med size), pRCA 60% (large dominant), mRCA 70%, dRCA minor plaque, PDA/PLS minor plaque, EF 65%.     to OR today:   CABG x 3   intraop: 2 U pRBC  insulin requirements and infusion for hyperglycemia  paced - underlying rhythm - asystole with occ   arrived to ICU on Levo 0.04 and Vaso 2.       ICU Vital Signs Last 24 Hrs  HR: 77 (22 Jul 2020 16:05) (77 - 77) Av paced  ABP: 128/56 (22 Jul 2020 16:05) (128/56 - 128/56)  ABP(mean): 83 (22 Jul 2020 16:05) (83 - 83)  SpO2: 100% (22 Jul 2020 16:05) (100% - 100%)    Qtts:   Vaso 2  Levo 0.04    Physical Exam    Heart - regular (-)rub/gallop  Lungs - BS appreciated bilaterally (-)rub/gallop  Abd (+)BS soft NTND (-)r/r/g  Ext - warm to touch RLE - left AKA - stump clean and dry  Chest - op bandage in place   Neuro - pupils reactive to light; otherwise unable at this time  Skin - no rash     LABS: pending post-op     RADIOLOGY & ADDITIONAL STUDIES: reviewed    patient with poorly controlled DM and CAD with anginal-equivalent sxs now s/p CABG x 3    1. CV  requiring levo and vasopressin at this time - titrate to maintain MAP 65  paced - cont to monitor and check native rhythm  complete periop Abx prophylaxis  ASA/statin    2. Pulm   serial ABG to optimize oxygenation and ventilation   monitor chest tube output  anticipate wean to extubate in short post-op period    3. Endocrine  maintain glycemic control  insulin infusion and bolus required intraop - arrived to ICU on insulin infusion with normal glucose  diabetic teaching and education when appropriate    DVT and GI prophylaxis    obed.w anesthesia/staff and CTS    I have spent/provided stated minutes of critical care time to this patient: 60 min

## 2020-07-23 DIAGNOSIS — I44.30 UNSPECIFIED ATRIOVENTRICULAR BLOCK: ICD-10-CM

## 2020-07-23 LAB
ALBUMIN SERPL ELPH-MCNC: 3 G/DL — LOW (ref 3.3–5)
ALP SERPL-CCNC: 72 U/L — SIGNIFICANT CHANGE UP (ref 40–120)
ALT FLD-CCNC: 23 U/L — SIGNIFICANT CHANGE UP (ref 10–45)
ANION GAP SERPL CALC-SCNC: 10 MMOL/L — SIGNIFICANT CHANGE UP (ref 5–17)
ANION GAP SERPL CALC-SCNC: 12 MMOL/L — SIGNIFICANT CHANGE UP (ref 5–17)
APTT BLD: 24.6 SEC — LOW (ref 27.5–35.5)
APTT BLD: 25.7 SEC — LOW (ref 27.5–35.5)
AST SERPL-CCNC: 118 U/L — HIGH (ref 10–40)
BASE EXCESS BLDA CALC-SCNC: -2.5 MMOL/L — LOW (ref -2–3)
BILIRUB SERPL-MCNC: 0.7 MG/DL — SIGNIFICANT CHANGE UP (ref 0.2–1.2)
BUN SERPL-MCNC: 11 MG/DL — SIGNIFICANT CHANGE UP (ref 7–23)
BUN SERPL-MCNC: 9 MG/DL — SIGNIFICANT CHANGE UP (ref 7–23)
CALCIUM SERPL-MCNC: 8.2 MG/DL — LOW (ref 8.4–10.5)
CALCIUM SERPL-MCNC: 8.2 MG/DL — LOW (ref 8.4–10.5)
CHLORIDE SERPL-SCNC: 106 MMOL/L — SIGNIFICANT CHANGE UP (ref 96–108)
CHLORIDE SERPL-SCNC: 108 MMOL/L — SIGNIFICANT CHANGE UP (ref 96–108)
CO2 SERPL-SCNC: 21 MMOL/L — LOW (ref 22–31)
CO2 SERPL-SCNC: 22 MMOL/L — SIGNIFICANT CHANGE UP (ref 22–31)
CREAT SERPL-MCNC: 0.39 MG/DL — LOW (ref 0.5–1.3)
CREAT SERPL-MCNC: 0.51 MG/DL — SIGNIFICANT CHANGE UP (ref 0.5–1.3)
GAS PNL BLDA: SIGNIFICANT CHANGE UP
GLUCOSE BLDC GLUCOMTR-MCNC: 109 MG/DL — HIGH (ref 70–99)
GLUCOSE BLDC GLUCOMTR-MCNC: 121 MG/DL — HIGH (ref 70–99)
GLUCOSE BLDC GLUCOMTR-MCNC: 127 MG/DL — HIGH (ref 70–99)
GLUCOSE BLDC GLUCOMTR-MCNC: 202 MG/DL — HIGH (ref 70–99)
GLUCOSE BLDC GLUCOMTR-MCNC: 216 MG/DL — HIGH (ref 70–99)
GLUCOSE BLDC GLUCOMTR-MCNC: 221 MG/DL — HIGH (ref 70–99)
GLUCOSE BLDC GLUCOMTR-MCNC: 224 MG/DL — HIGH (ref 70–99)
GLUCOSE BLDC GLUCOMTR-MCNC: 78 MG/DL — SIGNIFICANT CHANGE UP (ref 70–99)
GLUCOSE SERPL-MCNC: 130 MG/DL — HIGH (ref 70–99)
GLUCOSE SERPL-MCNC: 219 MG/DL — HIGH (ref 70–99)
HCO3 BLDA-SCNC: 20 MMOL/L — LOW (ref 21–28)
HCT VFR BLD CALC: 26.8 % — LOW (ref 34.5–45)
HCT VFR BLD CALC: 32.2 % — LOW (ref 34.5–45)
HGB BLD-MCNC: 10.9 G/DL — LOW (ref 11.5–15.5)
HGB BLD-MCNC: 9.3 G/DL — LOW (ref 11.5–15.5)
INR BLD: 1.18 — HIGH (ref 0.88–1.16)
INR BLD: 1.23 — HIGH (ref 0.88–1.16)
LACTATE SERPL-SCNC: 2 MMOL/L — SIGNIFICANT CHANGE UP (ref 0.5–2)
LACTATE SERPL-SCNC: 2.6 MMOL/L — HIGH (ref 0.5–2)
MAGNESIUM SERPL-MCNC: 2 MG/DL — SIGNIFICANT CHANGE UP (ref 1.6–2.6)
MAGNESIUM SERPL-MCNC: 2 MG/DL — SIGNIFICANT CHANGE UP (ref 1.6–2.6)
MCHC RBC-ENTMCNC: 27.7 PG — SIGNIFICANT CHANGE UP (ref 27–34)
MCHC RBC-ENTMCNC: 28.3 PG — SIGNIFICANT CHANGE UP (ref 27–34)
MCHC RBC-ENTMCNC: 33.9 GM/DL — SIGNIFICANT CHANGE UP (ref 32–36)
MCHC RBC-ENTMCNC: 34.7 GM/DL — SIGNIFICANT CHANGE UP (ref 32–36)
MCV RBC AUTO: 81.5 FL — SIGNIFICANT CHANGE UP (ref 80–100)
MCV RBC AUTO: 81.9 FL — SIGNIFICANT CHANGE UP (ref 80–100)
NRBC # BLD: 0 /100 WBCS — SIGNIFICANT CHANGE UP (ref 0–0)
NRBC # BLD: 0 /100 WBCS — SIGNIFICANT CHANGE UP (ref 0–0)
PCO2 BLDA: 27 MMHG — LOW (ref 32–45)
PH BLDA: 7.49 — HIGH (ref 7.35–7.45)
PHOSPHATE SERPL-MCNC: 3.2 MG/DL — SIGNIFICANT CHANGE UP (ref 2.5–4.5)
PHOSPHATE SERPL-MCNC: 4.2 MG/DL — SIGNIFICANT CHANGE UP (ref 2.5–4.5)
PLATELET # BLD AUTO: 101 K/UL — LOW (ref 150–400)
PLATELET # BLD AUTO: 89 K/UL — LOW (ref 150–400)
PO2 BLDA: 70 MMHG — LOW (ref 83–108)
POTASSIUM SERPL-MCNC: 3.7 MMOL/L — SIGNIFICANT CHANGE UP (ref 3.5–5.3)
POTASSIUM SERPL-MCNC: 4 MMOL/L — SIGNIFICANT CHANGE UP (ref 3.5–5.3)
POTASSIUM SERPL-SCNC: 3.7 MMOL/L — SIGNIFICANT CHANGE UP (ref 3.5–5.3)
POTASSIUM SERPL-SCNC: 4 MMOL/L — SIGNIFICANT CHANGE UP (ref 3.5–5.3)
PROT SERPL-MCNC: 4.6 G/DL — LOW (ref 6–8.3)
PROTHROM AB SERPL-ACNC: 14.1 SEC — HIGH (ref 10.6–13.6)
PROTHROM AB SERPL-ACNC: 14.6 SEC — HIGH (ref 10.6–13.6)
RBC # BLD: 3.29 M/UL — LOW (ref 3.8–5.2)
RBC # BLD: 3.93 M/UL — SIGNIFICANT CHANGE UP (ref 3.8–5.2)
RBC # FLD: 14.4 % — SIGNIFICANT CHANGE UP (ref 10.3–14.5)
RBC # FLD: 14.6 % — HIGH (ref 10.3–14.5)
SAO2 % BLDA: 95 % — SIGNIFICANT CHANGE UP (ref 95–100)
SODIUM SERPL-SCNC: 139 MMOL/L — SIGNIFICANT CHANGE UP (ref 135–145)
SODIUM SERPL-SCNC: 140 MMOL/L — SIGNIFICANT CHANGE UP (ref 135–145)
WBC # BLD: 10.77 K/UL — HIGH (ref 3.8–10.5)
WBC # BLD: 10.83 K/UL — HIGH (ref 3.8–10.5)
WBC # FLD AUTO: 10.77 K/UL — HIGH (ref 3.8–10.5)
WBC # FLD AUTO: 10.83 K/UL — HIGH (ref 3.8–10.5)

## 2020-07-23 PROCEDURE — 71045 X-RAY EXAM CHEST 1 VIEW: CPT | Mod: 26

## 2020-07-23 PROCEDURE — 99292 CRITICAL CARE ADDL 30 MIN: CPT

## 2020-07-23 PROCEDURE — 93010 ELECTROCARDIOGRAM REPORT: CPT | Mod: 76

## 2020-07-23 PROCEDURE — 99223 1ST HOSP IP/OBS HIGH 75: CPT

## 2020-07-23 PROCEDURE — 99291 CRITICAL CARE FIRST HOUR: CPT

## 2020-07-23 RX ORDER — GLUCAGON INJECTION, SOLUTION 0.5 MG/.1ML
1 INJECTION, SOLUTION SUBCUTANEOUS ONCE
Refills: 0 | Status: DISCONTINUED | OUTPATIENT
Start: 2020-07-23 | End: 2020-07-23

## 2020-07-23 RX ORDER — FUROSEMIDE 40 MG
40 TABLET ORAL ONCE
Refills: 0 | Status: COMPLETED | OUTPATIENT
Start: 2020-07-23 | End: 2020-07-23

## 2020-07-23 RX ORDER — ALBUMIN HUMAN 25 %
250 VIAL (ML) INTRAVENOUS
Refills: 0 | Status: COMPLETED | OUTPATIENT
Start: 2020-07-23 | End: 2020-07-23

## 2020-07-23 RX ORDER — OXYCODONE AND ACETAMINOPHEN 5; 325 MG/1; MG/1
2 TABLET ORAL EVERY 6 HOURS
Refills: 0 | Status: DISCONTINUED | OUTPATIENT
Start: 2020-07-23 | End: 2020-07-23

## 2020-07-23 RX ORDER — OXYCODONE AND ACETAMINOPHEN 5; 325 MG/1; MG/1
1 TABLET ORAL EVERY 4 HOURS
Refills: 0 | Status: DISCONTINUED | OUTPATIENT
Start: 2020-07-23 | End: 2020-07-23

## 2020-07-23 RX ORDER — ONDANSETRON 8 MG/1
4 TABLET, FILM COATED ORAL ONCE
Refills: 0 | Status: COMPLETED | OUTPATIENT
Start: 2020-07-23 | End: 2020-07-23

## 2020-07-23 RX ORDER — DEXTROSE 50 % IN WATER 50 %
25 SYRINGE (ML) INTRAVENOUS ONCE
Refills: 0 | Status: DISCONTINUED | OUTPATIENT
Start: 2020-07-23 | End: 2020-07-23

## 2020-07-23 RX ORDER — FUROSEMIDE 40 MG
20 TABLET ORAL ONCE
Refills: 0 | Status: COMPLETED | OUTPATIENT
Start: 2020-07-23 | End: 2020-07-23

## 2020-07-23 RX ORDER — INSULIN GLARGINE 100 [IU]/ML
30 INJECTION, SOLUTION SUBCUTANEOUS AT BEDTIME
Refills: 0 | Status: DISCONTINUED | OUTPATIENT
Start: 2020-07-23 | End: 2020-07-24

## 2020-07-23 RX ORDER — HUMAN INSULIN 100 [IU]/ML
15 INJECTION, SUSPENSION SUBCUTANEOUS ONCE
Refills: 0 | Status: COMPLETED | OUTPATIENT
Start: 2020-07-23 | End: 2020-07-23

## 2020-07-23 RX ORDER — DEXTROSE 50 % IN WATER 50 %
12.5 SYRINGE (ML) INTRAVENOUS ONCE
Refills: 0 | Status: DISCONTINUED | OUTPATIENT
Start: 2020-07-23 | End: 2020-07-23

## 2020-07-23 RX ORDER — INSULIN LISPRO 100/ML
VIAL (ML) SUBCUTANEOUS
Refills: 0 | Status: DISCONTINUED | OUTPATIENT
Start: 2020-07-23 | End: 2020-07-31

## 2020-07-23 RX ORDER — ACETAMINOPHEN 500 MG
650 TABLET ORAL EVERY 6 HOURS
Refills: 0 | Status: DISCONTINUED | OUTPATIENT
Start: 2020-07-23 | End: 2020-07-25

## 2020-07-23 RX ORDER — DEXTROSE 50 % IN WATER 50 %
25 SYRINGE (ML) INTRAVENOUS ONCE
Refills: 0 | Status: DISCONTINUED | OUTPATIENT
Start: 2020-07-23 | End: 2020-07-31

## 2020-07-23 RX ORDER — DEXTROSE 50 % IN WATER 50 %
15 SYRINGE (ML) INTRAVENOUS ONCE
Refills: 0 | Status: DISCONTINUED | OUTPATIENT
Start: 2020-07-23 | End: 2020-07-23

## 2020-07-23 RX ORDER — PANTOPRAZOLE SODIUM 20 MG/1
40 TABLET, DELAYED RELEASE ORAL
Refills: 0 | Status: DISCONTINUED | OUTPATIENT
Start: 2020-07-23 | End: 2020-07-24

## 2020-07-23 RX ORDER — INSULIN LISPRO 100/ML
7 VIAL (ML) SUBCUTANEOUS
Refills: 0 | Status: DISCONTINUED | OUTPATIENT
Start: 2020-07-23 | End: 2020-07-24

## 2020-07-23 RX ORDER — POTASSIUM CHLORIDE 20 MEQ
20 PACKET (EA) ORAL ONCE
Refills: 0 | Status: COMPLETED | OUTPATIENT
Start: 2020-07-23 | End: 2020-07-23

## 2020-07-23 RX ORDER — SODIUM CHLORIDE 9 MG/ML
1000 INJECTION, SOLUTION INTRAVENOUS
Refills: 0 | Status: DISCONTINUED | OUTPATIENT
Start: 2020-07-23 | End: 2020-07-23

## 2020-07-23 RX ORDER — CALCIUM GLUCONATE 100 MG/ML
1 VIAL (ML) INTRAVENOUS ONCE
Refills: 0 | Status: COMPLETED | OUTPATIENT
Start: 2020-07-23 | End: 2020-07-23

## 2020-07-23 RX ORDER — POLYETHYLENE GLYCOL 3350 17 G/17G
17 POWDER, FOR SOLUTION ORAL DAILY
Refills: 0 | Status: DISCONTINUED | OUTPATIENT
Start: 2020-07-23 | End: 2020-07-31

## 2020-07-23 RX ADMIN — Medication 100 MILLIGRAM(S): at 06:02

## 2020-07-23 RX ADMIN — HEPARIN SODIUM 5000 UNIT(S): 5000 INJECTION INTRAVENOUS; SUBCUTANEOUS at 14:46

## 2020-07-23 RX ADMIN — Medication 4: at 07:46

## 2020-07-23 RX ADMIN — Medication 100 GRAM(S): at 03:49

## 2020-07-23 RX ADMIN — Medication 250 MILLILITER(S): at 04:08

## 2020-07-23 RX ADMIN — Medication 40 MILLIGRAM(S): at 16:01

## 2020-07-23 RX ADMIN — INSULIN GLARGINE 30 UNIT(S): 100 INJECTION, SOLUTION SUBCUTANEOUS at 21:25

## 2020-07-23 RX ADMIN — Medication 20 MILLIGRAM(S): at 11:40

## 2020-07-23 RX ADMIN — Medication 1000 MILLIGRAM(S): at 02:22

## 2020-07-23 RX ADMIN — Medication 4: at 21:25

## 2020-07-23 RX ADMIN — HEPARIN SODIUM 5000 UNIT(S): 5000 INJECTION INTRAVENOUS; SUBCUTANEOUS at 06:02

## 2020-07-23 RX ADMIN — HEPARIN SODIUM 5000 UNIT(S): 5000 INJECTION INTRAVENOUS; SUBCUTANEOUS at 21:25

## 2020-07-23 RX ADMIN — OXYCODONE AND ACETAMINOPHEN 1 TABLET(S): 5; 325 TABLET ORAL at 14:46

## 2020-07-23 RX ADMIN — Medication 400 MILLIGRAM(S): at 01:15

## 2020-07-23 RX ADMIN — Medication 125 MILLILITER(S): at 17:24

## 2020-07-23 RX ADMIN — FENTANYL CITRATE 25 MICROGRAM(S): 50 INJECTION INTRAVENOUS at 06:02

## 2020-07-23 RX ADMIN — Medication 4: at 11:39

## 2020-07-23 RX ADMIN — CHLORHEXIDINE GLUCONATE 5 MILLILITER(S): 213 SOLUTION TOPICAL at 02:23

## 2020-07-23 RX ADMIN — CHLORHEXIDINE GLUCONATE 1 APPLICATION(S): 213 SOLUTION TOPICAL at 11:40

## 2020-07-23 RX ADMIN — Medication 7 UNIT(S): at 07:46

## 2020-07-23 RX ADMIN — HUMAN INSULIN 15 UNIT(S): 100 INJECTION, SUSPENSION SUBCUTANEOUS at 07:03

## 2020-07-23 RX ADMIN — Medication 81 MILLIGRAM(S): at 11:40

## 2020-07-23 RX ADMIN — Medication 100 MILLIEQUIVALENT(S): at 02:51

## 2020-07-23 RX ADMIN — Medication 4: at 17:35

## 2020-07-23 RX ADMIN — Medication 100 MILLIGRAM(S): at 21:26

## 2020-07-23 RX ADMIN — FENTANYL CITRATE 25 MICROGRAM(S): 50 INJECTION INTRAVENOUS at 07:04

## 2020-07-23 RX ADMIN — Medication 100 MILLIGRAM(S): at 14:46

## 2020-07-23 RX ADMIN — Medication 250 MILLILITER(S): at 02:51

## 2020-07-23 RX ADMIN — OXYCODONE AND ACETAMINOPHEN 1 TABLET(S): 5; 325 TABLET ORAL at 15:47

## 2020-07-23 RX ADMIN — Medication 125 MILLILITER(S): at 18:41

## 2020-07-23 RX ADMIN — ATORVASTATIN CALCIUM 40 MILLIGRAM(S): 80 TABLET, FILM COATED ORAL at 21:25

## 2020-07-23 RX ADMIN — ONDANSETRON 4 MILLIGRAM(S): 8 TABLET, FILM COATED ORAL at 16:01

## 2020-07-23 RX ADMIN — POLYETHYLENE GLYCOL 3350 17 GRAM(S): 17 POWDER, FOR SOLUTION ORAL at 11:40

## 2020-07-23 NOTE — PHYSICAL THERAPY INITIAL EVALUATION ADULT - DIAGNOSIS, PT EVAL
6D: Impaired Aerobic Capacity/Endurance Associated with Cardiovascular Pump Dysfunction or Failure, 5A: Primary Prevention/Risk Reduction for Loss of Balance and Falling

## 2020-07-23 NOTE — CONSULT NOTE ADULT - ASSESSMENT
74 yo F POOR HISTORIAN with PMHx known 3VCAD (diagnostic cath 8/2019 @ [facility]), HTN, HLD, Uncontrolled Insulin Dependent DM II (Hgb A1C 11.8% on 7/17/2020), PAD s/p LSFA/Popliteal/TP trunk and Posterior Tibial PTA and stent 8/2019  (s/p peripheral angiogram @ [facility] 5/2020 with "ballooning and stenting" per patient- on Eliquis, Osteomyelitis s/p AKA LLE 11/2019 who presented to cardiologist Dr. Yepez for follow-up. Patient reports SOB after climbing 1-2 flights of stairs resolved with rest ( CCS Angina Class III Equivalent Symptoms). She denies chest pain, palpitations, dizziness, syncope, orthopnea, PND. Does however report RLE dependent edema..In light of pt's risk factors, known 3VCAD, CCS Angina Class III Equivalent Symptoms, she is now sp cardiac cath 7/17/20, revealing severe 3VCAD: dLM 40-50% (largely Ca++), pLAD 70% (largely Ca++), mid/distalLAD minor plaque, ostialD1 70% (medium sized vessel), ostialLCx 80% (largely Ca++), OM1/OM2 minor plaque (med size), pRCA 60% (large dominant), mRCA 70%, dRCA minor plaque, PDA/PLS minor plaque, EF 65%. The patient is now s/p CABGx3 on 7/22. She has epicardials in place and set to VVI 50. Today - POD#1, the patient was noted to be in AVB by the primary team. EP consulted.

## 2020-07-23 NOTE — PHYSICAL THERAPY INITIAL EVALUATION ADULT - PERTINENT HX OF CURRENT PROBLEM, REHAB EVAL
76 yo F POOR HISTORIAN with PMHx known 3VCAD (diagnostic cath 8/2019 @ [facility]), HTN, HLD, Uncontrolled Insulin Dependent DM II (Hgb A1C 11.8% on 7/17/2020), PAD s/p LSFA/Popliteal/TP trunk and Posterior Tibial PTA and stent 8/2019  (s/p peripheral angiogram @ [facility] 5/2020 with "ballooning and stenting" per patient- on Eliquis, Osteomyelitis s/p AKA LLE 11/2019

## 2020-07-23 NOTE — PHYSICAL THERAPY INITIAL EVALUATION ADULT - IMPAIRED TRANSFERS: SIT/STAND, REHAB EVAL
narrow base of support/impaired postural control/decreased strength/impaired balance/pain/decreased ROM

## 2020-07-23 NOTE — PHYSICAL THERAPY INITIAL EVALUATION ADULT - LEVEL OF INDEPENDENCE: BED TO CHAIR, REHAB EVAL
negative maximum assist (25% patients effort)/stand pivot R SLS Regular rate & rhythm, normal S1, S2; no murmurs, gallops or rubs; no S3, S4

## 2020-07-23 NOTE — PHYSICAL THERAPY INITIAL EVALUATION ADULT - PLANNED THERAPY INTERVENTIONS, PT EVAL
gait training/postural re-education/transfer training/balance training/strengthening/bed mobility training/ROM

## 2020-07-23 NOTE — PHYSICAL THERAPY INITIAL EVALUATION ADULT - CRITERIA FOR SKILLED THERAPEUTIC INTERVENTIONS
rehab potential/impairments found/risk reduction/prevention/anticipated discharge recommendation/predicted duration of therapy intervention/functional limitations in following categories

## 2020-07-23 NOTE — PHYSICAL THERAPY INITIAL EVALUATION ADULT - GENERAL OBSERVATIONS, REHAB EVAL
Spoke to RN Ned Adler, pt cleared for PT. POD#1 CABG with FRANCES. Pt rcvd semi supine, +tele, +temp pacemaker, +chest tubes x2, +R TLC, +R radial Maddi, +tiffany x2, +NC 0L, sternotomy site CDI. Pt alert, A&Ox3, reports 8/10 pain. Tolerated session fairly, demo maxA bed mob, MaxAx2 stand pivot to chair.  FIm gait=0.

## 2020-07-23 NOTE — CONSULT NOTE ADULT - SUBJECTIVE AND OBJECTIVE BOX
CHIEF COMPLAINT:     HISTORY OF PRESENT ILLNESS:    PAST MEDICAL & SURGICAL HISTORY:  History of osteomyelitis  CAD (coronary artery disease)  PAD (peripheral artery disease)  Diabetes mellitus  HLD (hyperlipidemia)  HTN (hypertension)    Allergies  No Known Allergies    MEDICATIONS:  ceFAZolin   IVPB 2000 milliGRAM(s) IV Intermittent every 8 hours  acetaminophen   Tablet .. 650 milliGRAM(s) Oral every 6 hours PRN  pantoprazole    Tablet 40 milliGRAM(s) Oral before breakfast  polyethylene glycol 3350 17 Gram(s) Oral daily  atorvastatin 40 milliGRAM(s) Oral at bedtime  insulin glargine Injectable (LANTUS) 30 Unit(s) SubCutaneous at bedtime  insulin lispro (HumaLOG) corrective regimen sliding scale   SubCutaneous Before meals and at bedtime  insulin lispro Injectable (HumaLOG) 7 Unit(s) SubCutaneous three times a day before meals  aspirin enteric coated 81 milliGRAM(s) Oral daily  chlorhexidine 2% Cloths 1 Application(s) Topical daily  heparin   Injectable 5000 Unit(s) SubCutaneous every 8 hours  sodium chloride 0.9%. 1000 milliLiter(s) IV Continuous <Continuous>      SOCIAL HISTORY:    [ ] Non-smoker  [ ] Smoker  [ ] Alcohol    REVIEW OF SYSTEMS:    CONSTITUTIONAL: No fever, weight loss, or fatigue  EYES: No eye pain, visual disturbances, or discharge  ENMT:  No difficulty hearing, tinnitus, vertigo; No sinus or throat pain  NECK: No pain or stiffness  BREASTS: No pain, masses, or nipple discharge  RESPIRATORY: No cough, wheezing, chills or hemoptysis; No Shortness of Breath  CARDIOVASCULAR: No chest pain, palpitations, dizziness, or leg swelling  GASTROINTESTINAL: No abdominal or epigastric pain. No nausea, vomiting, or hematemesis; No diarrhea or constipation. No melena or hematochezia.  GENITOURINARY: No dysuria, frequency, hematuria, or incontinence  NEUROLOGICAL: No headaches, memory loss, loss of strength, numbness, or tremors  SKIN: No itching, burning, rashes, or lesions   LYMPH Nodes: No enlarged glands  ENDOCRINE: No heat or cold intolerance; No hair loss  MUSCULOSKELETAL: No joint pain or swelling; No muscle, back, or extremity pain  PSYCHIATRIC: No depression, anxiety, mood swings, or difficulty sleeping  HEME/LYMPH: No easy bruising, or bleeding gums  ALLERY AND IMMUNOLOGIC: No hives or eczema	    [ ] All others negative	  [ ] Unable to obtain    PHYSICAL EXAM:  T(C): 36.4 (07-23-20 @ 18:01), Max: 36.4 (07-23-20 @ 18:01)  HR: 54 (07-23-20 @ 19:00) (51 - 85)  BP: 129/58 (07-23-20 @ 19:00) (114/73 - 138/62)  RR: 16 (07-23-20 @ 07:00) (12 - 18)  SpO2: 93% (07-23-20 @ 19:00) (93% - 100%)    I&O's Summary    22 Jul 2020 07:01  -  23 Jul 2020 07:00  --------------------------------------------------------  IN: 2348 mL / OUT: 2210 mL / NET: 138 mL    23 Jul 2020 07:01  -  23 Jul 2020 20:29  --------------------------------------------------------  IN: 670 mL / OUT: 680 mL / NET: -10 mL    TELEMETRY: High degree AVB with narrow escape and intermittent conduction   	    ECG:     Appearance: Normal	  HEENT:   Normal oral mucosa, PERRL, EOMI	  Cardiovascular: Normal S1 S2, No JVD, No murmurs, No edema  Respiratory: Lungs clear to auscultation	  Gastrointestinal:  Soft, Non-tender, + BS	  Neurologic: A&O x 3, Non-focal  Extremities: Normal range of motion, No clubbing, cyanosis or edema  Vascular: Peripheral pulses palpable 2+ bilaterally    	  LABS:	 	                        9.3    10.83 )-----------( 89       ( 23 Jul 2020 13:15 )             26.8     07-23    140  |  106  |  11  ----------------------------<  219<H>  4.0   |  22  |  0.51    Ca    8.2<L>      23 Jul 2020 13:15  Phos  4.2     07-23  Mg     2.0     07-23    TPro  4.6<L>  /  Alb  3.0<L>  /  TBili  0.7  /  DBili  x   /  AST  118<H>  /  ALT  23  /  AlkPhos  72  07-23    < from: TTE Echo Complete w/o Contrast w/ Doppler (07.17.20 @ 16:10) >   1. No significant valvular disease.   2. The right ventricle is normal in size. Right ventricular systolic function is normal.   3. There is mild-to-moderate concentric left ventricular hypertrophy. The left ventricle is normal in size and systolic function with a calculated ejection fraction of 55-60%. There are no regional wall motion abnormalities seen.   4. No pericardial effusion. CHIEF COMPLAINT:     HISTORY OF PRESENT ILLNESS: 74 yo F POOR HISTORIAN with PMHx known 3VCAD (diagnostic cath 8/2019 @ [place]), HTN, HLD, Uncontrolled Insulin Dependent DM II (Hgb A1C 11.8% on 7/17/2020), PAD s/p LSFA/Popliteal/TP trunk and Posterior Tibial PTA and stent 8/2019  (s/p peripheral angiogram @ [place] 5/2020 with "ballooning and stenting" per patient- on Eliquis, Osteomyelitis s/p AKA LLE 11/2019 who presented to cardiologist Dr. Yepez for follow-up. Patient reports SOB after climbing 1-2 flights of stairs resolved with rest ( CCS Angina Class III Equivalent Symptoms). She denies chest pain, palpitations, dizziness, syncope, orthopnea, PND. Does however report RLE dependent edema..In light of pt's risk factors, known 3VCAD, CCS Angina Class III Equivalent Symptoms, she is now sp cardiac cath 7/17/20, revealing severe 3VCAD: dLM 40-50% (largely Ca++), pLAD 70% (largely Ca++), mid/distalLAD minor plaque, ostialD1 70% (medium sized vessel), ostialLCx 80% (largely Ca++), OM1/OM2 minor plaque (med size), pRCA 60% (large dominant), mRCA 70%, dRCA minor plaque, PDA/PLS minor plaque, EF 65%. The patient is now s/p CABGx3 on 7/22. She has epicardials in place and set to VVI 50. Today - POD#1, the patient was noted to be in AVB by the primary team. EP consulted.     PAST MEDICAL & SURGICAL HISTORY:  History of osteomyelitis  CAD (coronary artery disease)  PAD (peripheral artery disease)  Diabetes mellitus  HLD (hyperlipidemia)  HTN (hypertension)    Allergies  No Known Allergies    MEDICATIONS:  ceFAZolin   IVPB 2000 milliGRAM(s) IV Intermittent every 8 hours  acetaminophen   Tablet .. 650 milliGRAM(s) Oral every 6 hours PRN  pantoprazole    Tablet 40 milliGRAM(s) Oral before breakfast  polyethylene glycol 3350 17 Gram(s) Oral daily  atorvastatin 40 milliGRAM(s) Oral at bedtime  insulin glargine Injectable (LANTUS) 30 Unit(s) SubCutaneous at bedtime  insulin lispro (HumaLOG) corrective regimen sliding scale   SubCutaneous Before meals and at bedtime  insulin lispro Injectable (HumaLOG) 7 Unit(s) SubCutaneous three times a day before meals  aspirin enteric coated 81 milliGRAM(s) Oral daily  chlorhexidine 2% Cloths 1 Application(s) Topical daily  heparin   Injectable 5000 Unit(s) SubCutaneous every 8 hours  sodium chloride 0.9%. 1000 milliLiter(s) IV Continuous <Continuous>      SOCIAL HISTORY:    [ ] Non-smoker  [ ] Smoker  [ ] Alcohol    REVIEW OF SYSTEMS:    CONSTITUTIONAL: No fever, weight loss, or fatigue  EYES: No eye pain, visual disturbances, or discharge  ENMT:  No difficulty hearing, tinnitus, vertigo; No sinus or throat pain  NECK: No pain or stiffness  BREASTS: No pain, masses, or nipple discharge  RESPIRATORY: No cough, wheezing, chills or hemoptysis; No Shortness of Breath  CARDIOVASCULAR: No chest pain, palpitations, dizziness, or leg swelling  GASTROINTESTINAL: No abdominal or epigastric pain. No nausea, vomiting, or hematemesis; No diarrhea or constipation. No melena or hematochezia.  GENITOURINARY: No dysuria, frequency, hematuria, or incontinence  NEUROLOGICAL: No headaches, memory loss, loss of strength, numbness, or tremors  SKIN: No itching, burning, rashes, or lesions   LYMPH Nodes: No enlarged glands  ENDOCRINE: No heat or cold intolerance; No hair loss  MUSCULOSKELETAL: No joint pain or swelling; No muscle, back, or extremity pain  PSYCHIATRIC: No depression, anxiety, mood swings, or difficulty sleeping  HEME/LYMPH: No easy bruising, or bleeding gums  ALLERY AND IMMUNOLOGIC: No hives or eczema	    [ ] All others negative	  [ ] Unable to obtain    PHYSICAL EXAM:  T(C): 36.4 (07-23-20 @ 18:01), Max: 36.4 (07-23-20 @ 18:01)  HR: 54 (07-23-20 @ 19:00) (51 - 85)  BP: 129/58 (07-23-20 @ 19:00) (114/73 - 138/62)  RR: 16 (07-23-20 @ 07:00) (12 - 18)  SpO2: 93% (07-23-20 @ 19:00) (93% - 100%)    I&O's Summary    22 Jul 2020 07:01 - 23 Jul 2020 07:00  --------------------------------------------------------  IN: 2348 mL / OUT: 2210 mL / NET: 138 mL    23 Jul 2020 07:01  -  23 Jul 2020 20:29  --------------------------------------------------------  IN: 670 mL / OUT: 680 mL / NET: -10 mL    TELEMETRY: High degree AVB with narrow escape and intermittent conduction   	    ECG:     Appearance: Normal	  HEENT:   Normal oral mucosa, PERRL, EOMI	  Cardiovascular: Normal S1 S2, No JVD, No murmurs, No edema  Respiratory: Lungs clear to auscultation	  Gastrointestinal:  Soft, Non-tender, + BS	  Neurologic: A&O x 3, Non-focal  Extremities: Normal range of motion, No clubbing, cyanosis or edema  Vascular: Peripheral pulses palpable 2+ bilaterally    	  LABS:	 	                        9.3    10.83 )-----------( 89       ( 23 Jul 2020 13:15 )             26.8     07-23    140  |  106  |  11  ----------------------------<  219<H>  4.0   |  22  |  0.51    Ca    8.2<L>      23 Jul 2020 13:15  Phos  4.2     07-23  Mg     2.0     07-23    TPro  4.6<L>  /  Alb  3.0<L>  /  TBili  0.7  /  DBili  x   /  AST  118<H>  /  ALT  23  /  AlkPhos  72  07-23    < from: TTE Echo Complete w/o Contrast w/ Doppler (07.17.20 @ 16:10) >   1. No significant valvular disease.   2. The right ventricle is normal in size. Right ventricular systolic function is normal.   3. There is mild-to-moderate concentric left ventricular hypertrophy. The left ventricle is normal in size and systolic function with a calculated ejection fraction of 55-60%. There are no regional wall motion abnormalities seen.   4. No pericardial effusion. CHIEF COMPLAINT:     HISTORY OF PRESENT ILLNESS: 76 yo F POOR HISTORIAN with PMHx known 3VCAD (diagnostic cath 8/2019 @ [place]), HTN, HLD, Uncontrolled Insulin Dependent DM II (Hgb A1C 11.8% on 7/17/2020), PAD s/p LSFA/Popliteal/TP trunk and Posterior Tibial PTA and stent 8/2019  (s/p peripheral angiogram @ [place] 5/2020 with "ballooning and stenting" per patient- on Eliquis, Osteomyelitis s/p AKA LLE 11/2019 who presented to cardiologist Dr. Yepez for follow-up. Patient reports SOB after climbing 1-2 flights of stairs resolved with rest ( CCS Angina Class III Equivalent Symptoms). She denies chest pain, palpitations, dizziness, syncope, orthopnea, PND. Does however report RLE dependent edema..In light of pt's risk factors, known 3VCAD, CCS Angina Class III Equivalent Symptoms, she is now sp cardiac cath 7/17/20, revealing severe 3VCAD: dLM 40-50% (largely Ca++), pLAD 70% (largely Ca++), mid/distalLAD minor plaque, ostialD1 70% (medium sized vessel), ostialLCx 80% (largely Ca++), OM1/OM2 minor plaque (med size), pRCA 60% (large dominant), mRCA 70%, dRCA minor plaque, PDA/PLS minor plaque, EF 65%. The patient is now s/p CABGx3 on 7/22. She has epicardials in place and set to VVI 50. Today - POD#1, the patient was noted to be in AVB by the primary team. EP consulted.     PAST MEDICAL & SURGICAL HISTORY:  History of osteomyelitis  CAD (coronary artery disease)  PAD (peripheral artery disease)  Diabetes mellitus  HLD (hyperlipidemia)  HTN (hypertension)    Allergies  No Known Allergies    MEDICATIONS:  ceFAZolin   IVPB 2000 milliGRAM(s) IV Intermittent every 8 hours  acetaminophen   Tablet .. 650 milliGRAM(s) Oral every 6 hours PRN  pantoprazole    Tablet 40 milliGRAM(s) Oral before breakfast  polyethylene glycol 3350 17 Gram(s) Oral daily  atorvastatin 40 milliGRAM(s) Oral at bedtime  insulin glargine Injectable (LANTUS) 30 Unit(s) SubCutaneous at bedtime  insulin lispro (HumaLOG) corrective regimen sliding scale   SubCutaneous Before meals and at bedtime  insulin lispro Injectable (HumaLOG) 7 Unit(s) SubCutaneous three times a day before meals  aspirin enteric coated 81 milliGRAM(s) Oral daily  chlorhexidine 2% Cloths 1 Application(s) Topical daily  heparin   Injectable 5000 Unit(s) SubCutaneous every 8 hours  sodium chloride 0.9%. 1000 milliLiter(s) IV Continuous <Continuous>      SOCIAL HISTORY:    [X] Non-smoker  [ ] Smoker  [ ] Alcohol    REVIEW OF SYSTEMS:    CONSTITUTIONAL: No fever, weight loss, or fatigue  EYES: No eye pain, visual disturbances, or discharge  ENMT:  No difficulty hearing, tinnitus, vertigo; No sinus or throat pain  NECK: No pain or stiffness  BREASTS: No pain, masses, or nipple discharge  RESPIRATORY: No cough, wheezing, chills or hemoptysis; No Shortness of Breath  CARDIOVASCULAR: No chest pain, palpitations, dizziness, or leg swelling  GASTROINTESTINAL: No abdominal or epigastric pain. No nausea, vomiting, or hematemesis; No diarrhea or constipation. No melena or hematochezia.  GENITOURINARY: No dysuria, frequency, hematuria, or incontinence  NEUROLOGICAL: No headaches, memory loss, loss of strength, numbness, or tremors  SKIN: No itching, burning, rashes, or lesions   LYMPH Nodes: No enlarged glands  ENDOCRINE: No heat or cold intolerance; No hair loss  MUSCULOSKELETAL: No joint pain or swelling; No muscle, back, or extremity pain  PSYCHIATRIC: No depression, anxiety, mood swings, or difficulty sleeping  HEME/LYMPH: No easy bruising, or bleeding gums  ALLERY AND IMMUNOLOGIC: No hives or eczema	    [ ] All others negative	  [X] Unable to obtain    PHYSICAL EXAM:  T(C): 36.4 (07-23-20 @ 18:01), Max: 36.4 (07-23-20 @ 18:01)  HR: 54 (07-23-20 @ 19:00) (51 - 85)  BP: 129/58 (07-23-20 @ 19:00) (114/73 - 138/62)  RR: 16 (07-23-20 @ 07:00) (12 - 18)  SpO2: 93% (07-23-20 @ 19:00) (93% - 100%)    I&O's Summary    22 Jul 2020 07:01 - 23 Jul 2020 07:00  --------------------------------------------------------  IN: 2348 mL / OUT: 2210 mL / NET: 138 mL    23 Jul 2020 07:01  -  23 Jul 2020 20:29  --------------------------------------------------------  IN: 670 mL / OUT: 680 mL / NET: -10 mL    TELEMETRY: High degree AVB with narrow escape and intermittent conduction   	      Appearance: Normal	  HEENT: Normal oral mucosa, PERRL, EOMI	  Cardiovascular: S1 S2, No JVD, No murmurs, No edema  Respiratory: Lungs clear to auscultation	  Gastrointestinal:  Soft, Non-tender, + BS	  Neurologic: A&O x 3, Non-focal  Extremities: Normal range of motion, No clubbing, cyanosis or edema  Vascular: Peripheral pulses palpable 2+ bilaterally    	  LABS:	 	                        9.3    10.83 )-----------( 89       ( 23 Jul 2020 13:15 )             26.8     07-23    140  |  106  |  11  ----------------------------<  219<H>  4.0   |  22  |  0.51    Ca    8.2<L>      23 Jul 2020 13:15  Phos  4.2     07-23  Mg     2.0     07-23    TPro  4.6<L>  /  Alb  3.0<L>  /  TBili  0.7  /  DBili  x   /  AST  118<H>  /  ALT  23  /  AlkPhos  72  07-23    < from: TTE Echo Complete w/o Contrast w/ Doppler (07.17.20 @ 16:10) >   1. No significant valvular disease.   2. The right ventricle is normal in size. Right ventricular systolic function is normal.   3. There is mild-to-moderate concentric left ventricular hypertrophy. The left ventricle is normal in size and systolic function with a calculated ejection fraction of 55-60%. There are no regional wall motion abnormalities seen.   4. No pericardial effusion. CHIEF COMPLAINT: CAD    HISTORY OF PRESENT ILLNESS: 74 yo F POOR HISTORIAN with PMHx known 3VCAD (diagnostic cath 8/2019 @ [place]), HTN, HLD, Uncontrolled Insulin Dependent DM II (Hgb A1C 11.8% on 7/17/2020), PAD s/p LSFA/Popliteal/TP trunk and Posterior Tibial PTA and stent 8/2019  (s/p peripheral angiogram @ [place] 5/2020 with "ballooning and stenting" per patient- on Eliquis, Osteomyelitis s/p AKA LLE 11/2019 who presented to cardiologist Dr. Yepez for follow-up. Patient reports SOB after climbing 1-2 flights of stairs resolved with rest ( CCS Angina Class III Equivalent Symptoms). She denies chest pain, palpitations, dizziness, syncope, orthopnea, PND. Does however report RLE dependent edema..In light of pt's risk factors, known 3VCAD, CCS Angina Class III Equivalent Symptoms, she is now sp cardiac cath 7/17/20, revealing severe 3VCAD: dLM 40-50% (largely Ca++), pLAD 70% (largely Ca++), mid/distalLAD minor plaque, ostialD1 70% (medium sized vessel), ostialLCx 80% (largely Ca++), OM1/OM2 minor plaque (med size), pRCA 60% (large dominant), mRCA 70%, dRCA minor plaque, PDA/PLS minor plaque, EF 65%. The patient is now s/p CABGx3 on 7/22. She has epicardials in place and set to VVI 50. Today - POD#1, the patient was noted to be in AVB by the primary team. EP consulted.     PAST MEDICAL & SURGICAL HISTORY:  History of osteomyelitis  CAD (coronary artery disease)  PAD (peripheral artery disease)  Diabetes mellitus  HLD (hyperlipidemia)  HTN (hypertension)    Allergies  No Known Allergies    MEDICATIONS:  ceFAZolin   IVPB 2000 milliGRAM(s) IV Intermittent every 8 hours  acetaminophen   Tablet .. 650 milliGRAM(s) Oral every 6 hours PRN  pantoprazole    Tablet 40 milliGRAM(s) Oral before breakfast  polyethylene glycol 3350 17 Gram(s) Oral daily  atorvastatin 40 milliGRAM(s) Oral at bedtime  insulin glargine Injectable (LANTUS) 30 Unit(s) SubCutaneous at bedtime  insulin lispro (HumaLOG) corrective regimen sliding scale   SubCutaneous Before meals and at bedtime  insulin lispro Injectable (HumaLOG) 7 Unit(s) SubCutaneous three times a day before meals  aspirin enteric coated 81 milliGRAM(s) Oral daily  chlorhexidine 2% Cloths 1 Application(s) Topical daily  heparin   Injectable 5000 Unit(s) SubCutaneous every 8 hours  sodium chloride 0.9%. 1000 milliLiter(s) IV Continuous <Continuous>      SOCIAL HISTORY:    [X] Non-smoker  [ ] Smoker  [ ] Alcohol    REVIEW OF SYSTEMS:    CONSTITUTIONAL: No fever, weight loss, or fatigue  EYES: No eye pain, visual disturbances, or discharge  ENMT:  No difficulty hearing, tinnitus, vertigo; No sinus or throat pain  NECK: No pain or stiffness  BREASTS: No pain, masses, or nipple discharge  RESPIRATORY: No cough, wheezing, chills or hemoptysis; No Shortness of Breath  CARDIOVASCULAR: No chest pain, palpitations, dizziness, or leg swelling  GASTROINTESTINAL: No abdominal or epigastric pain. No nausea, vomiting, or hematemesis; No diarrhea or constipation. No melena or hematochezia.  GENITOURINARY: No dysuria, frequency, hematuria, or incontinence  NEUROLOGICAL: No headaches, memory loss, loss of strength, numbness, or tremors  SKIN: No itching, burning, rashes, or lesions   LYMPH Nodes: No enlarged glands  ENDOCRINE: No heat or cold intolerance; No hair loss  MUSCULOSKELETAL: No joint pain or swelling; No muscle, back, or extremity pain  PSYCHIATRIC: No depression, anxiety, mood swings, or difficulty sleeping  HEME/LYMPH: No easy bruising, or bleeding gums  ALLERY AND IMMUNOLOGIC: No hives or eczema	    [ ] All others negative	  [X] Unable to obtain    PHYSICAL EXAM:  T(C): 36.4 (07-23-20 @ 18:01), Max: 36.4 (07-23-20 @ 18:01)  HR: 54 (07-23-20 @ 19:00) (51 - 85)  BP: 129/58 (07-23-20 @ 19:00) (114/73 - 138/62)  RR: 16 (07-23-20 @ 07:00) (12 - 18)  SpO2: 93% (07-23-20 @ 19:00) (93% - 100%)    I&O's Summary    22 Jul 2020 07:01 - 23 Jul 2020 07:00  --------------------------------------------------------  IN: 2348 mL / OUT: 2210 mL / NET: 138 mL    23 Jul 2020 07:01  -  23 Jul 2020 20:29  --------------------------------------------------------  IN: 670 mL / OUT: 680 mL / NET: -10 mL    TELEMETRY: High degree AVB with narrow escape and intermittent conduction   	      Appearance: Normal	  HEENT: Normal oral mucosa, PERRL, EOMI	  Cardiovascular: S1 S2, No JVD, No murmurs, No edema  Respiratory: Lungs clear to auscultation	  Gastrointestinal:  Soft, Non-tender, + BS	  Neurologic: A&O x 3, Non-focal  Extremities: Normal range of motion, No clubbing, cyanosis or edema  Vascular: Peripheral pulses palpable 2+ bilaterally    	  LABS:	 	                        9.3    10.83 )-----------( 89       ( 23 Jul 2020 13:15 )             26.8     07-23    140  |  106  |  11  ----------------------------<  219<H>  4.0   |  22  |  0.51    Ca    8.2<L>      23 Jul 2020 13:15  Phos  4.2     07-23  Mg     2.0     07-23    TPro  4.6<L>  /  Alb  3.0<L>  /  TBili  0.7  /  DBili  x   /  AST  118<H>  /  ALT  23  /  AlkPhos  72  07-23    < from: TTE Echo Complete w/o Contrast w/ Doppler (07.17.20 @ 16:10) >   1. No significant valvular disease.   2. The right ventricle is normal in size. Right ventricular systolic function is normal.   3. There is mild-to-moderate concentric left ventricular hypertrophy. The left ventricle is normal in size and systolic function with a calculated ejection fraction of 55-60%. There are no regional wall motion abnormalities seen.   4. No pericardial effusion.

## 2020-07-23 NOTE — CONSULT NOTE ADULT - PROBLEM SELECTOR RECOMMENDATION 9
Tele significant for high degree AVB with intermittent conduction. Escape is narrow.   - Continue holding all AV miguel agents.   - This patient is POD#1. Continue to monitor for conduction improvement. If there is no improvement over the weekend, plan for PPM Monday.

## 2020-07-23 NOTE — PHYSICAL THERAPY INITIAL EVALUATION ADULT - MODALITIES TREATMENT COMMENTS
Education with teach back on sternal precautions and benefits of use of prosthesis at home and during recovery to decrease CV effort during recovery and protect median sternotomy

## 2020-07-23 NOTE — PROGRESS NOTE ADULT - SUBJECTIVE AND OBJECTIVE BOX
PROCEDURE:  CABG x3 7/22      ISSUES:   Post operative hypotension  Hyperglycemia  CAD  HTN  DM2  HLD  PAD s/p FSFA/Popliteal/TP trunk and posterior tibial PTA and stent (8/2019)  Hx of osteomyelitis s/p LLE AKA    INTERVAL EVENTS:   Extubated this AM.  Weaned off pressors this AM  Chest tubes removed today.  Diuresis with lasix IV.        HISTORY:   Patient reports moderate pain at chest wall incision sites which is worse with coughing and deep breathing without associated fever or dyspnea. Pain is improved with use of pain meds.     PHYSICAL EXAM:   Gen: Comfortable, No acute distress  Eyes: Sclera white, Conjunctiva normal, Eyelids normal, Pupils symmetrical   ENT: Mucous membranes moist,  ,  ,    Neck: Trachea midline,  ,  ,  ,  ,    CV: Rate regular, Rhythm regular,  ,  ,    Resp: Breath sounds clear, No accessory muscles use,  ,    Abd: Soft, Non-distended, Non-tender, Bowel sounds normal,  ,    Skin: Warm, No peripheral edema of lower extremities,  ,    : Cunningham in place  Neuro: Moving all 4 extremities,    Psych: A&Ox3      ASSESSMENT AND PLAN:     NEURO:  Post-operative Pain - Pain control with oxycodone PO        RESPIRATORY:  Hypoxia - Wean nasal cannula for goal O2sat above 92. Obtain CXR. Incentive spirometry. Chest PT and frequent suctioning. Continue bronchodilators. OOB to chair & ambulate w/ assistance. Continuous pulse oximetry for support & to prevent decompensation.               CARDIOVASCULAR:  Hypotension requiring IV pressors - wean pressors for MAP goal of 65  HTN - stable. Hold home antihypertensives for now.  CAD - stable. Continue aspirin.       Fluid overload - lasix diuresis.   PVD - hold systemic anticoagulation. previously on apixaban.        RENAL:  Stable - Monitor IOs and electrolytes. Keep K above 4.0 and Mg above 2.0.         GASTROINTESTINAL:  GI prophylaxis with pantoprazole  Zofran and Reglan IV PRN for nausea  Regular consistency diet          HEMATOLOGIC:  No signs of active bleeding. Monitor Hgb in CBC in AM  DVT prophylaxis with heparin subQ and SCDs.         INFECTIOUS DISEASE:  All surgical sites appear clean. No signs of active infection. Will monitor for fever and leukocytosis.           Cefazolin for sternotomy                ENDOCRINE:  Hyperglycemia – Insulin gtt. Monitor glucose fingersticks for goal 120-180. Carb control diet.            Pertinent clinical, laboratory, radiographic, hemodynamic, echocardiographic, respiratory data, microbiologic data and chart were reviewed by myself and analyzed frequently throughout the course of the day and night by myself.    Plan discussed at length with the CTICU staff and Attending CT Surgeon.     Patient unable to participate with discussion of plan.    Patient required critical care management.  75 minutes were spent evaluating, managing, providing, coordinating, and documenting care for this patient, exclusive of procedures from  630AM to 6PM.      ________________________________________________  _________________________  VITAL SIGNS:  Vital Signs Last 24 Hrs  T(C): 36.1 (23 Jul 2020 13:21), Max: 36.1 (22 Jul 2020 19:00)  T(F): 97 (23 Jul 2020 13:21), Max: 97 (22 Jul 2020 19:00)  HR: 54 (23 Jul 2020 16:00) (51 - 85)  BP: 130/60 (23 Jul 2020 16:00) (130/60 - 130/60)  BP(mean): 87 (23 Jul 2020 16:00) (87 - 87)  RR: 16 (23 Jul 2020 07:00) (12 - 18)  SpO2: 93% (23 Jul 2020 16:00) (93% - 100%)  I/Os:   I&O's Detail    22 Jul 2020 07:01  -  23 Jul 2020 07:00  --------------------------------------------------------  IN:    Albumin 5%  - 250 mL: 500 mL    insulin regular Infusion: 57 mL    IV PiggyBack: 750 mL    niCARdipine Infusion: 252.5 mL    Oral Fluid: 110 mL    Packed Red Blood Cells: 500 mL    propofol Infusion: 28.5 mL    sodium chloride 0.9%.: 150 mL  Total IN: 2348 mL    OUT:    Chest Tube: 330 mL    Chest Tube: 300 mL    Voided: 1580 mL  Total OUT: 2210 mL    Total NET: 138 mL      23 Jul 2020 07:01  -  23 Jul 2020 16:41  --------------------------------------------------------  IN:    IV PiggyBack: 50 mL    sodium chloride 0.9%.: 100 mL  Total IN: 150 mL    OUT:    Chest Tube: 70 mL    Chest Tube: 60 mL    Indwelling Catheter - Urethral: 330 mL    Voided: 60 mL  Total OUT: 520 mL    Total NET: -370 mL          Mode: AC/ CMV (Assist Control/ Continuous Mandatory Ventilation)  RR (machine): 12  TV (machine): 500  FiO2: 50  PEEP: 5  ITime: 1  MAP: 11  PIP: 25      MEDICATIONS:  MEDICATIONS  (STANDING):  aspirin enteric coated 81 milliGRAM(s) Oral daily  atorvastatin 40 milliGRAM(s) Oral at bedtime  ceFAZolin   IVPB 2000 milliGRAM(s) IV Intermittent every 8 hours  chlorhexidine 2% Cloths 1 Application(s) Topical daily  dextrose 50% Injectable 25 Gram(s) IV Push once  dextrose 50% Injectable 50 milliLiter(s) IV Push every 15 minutes  dextrose 50% Injectable 25 milliLiter(s) IV Push every 15 minutes  heparin   Injectable 5000 Unit(s) SubCutaneous every 8 hours  insulin glargine Injectable (LANTUS) 30 Unit(s) SubCutaneous at bedtime  insulin lispro (HumaLOG) corrective regimen sliding scale   SubCutaneous Before meals and at bedtime  insulin lispro Injectable (HumaLOG) 7 Unit(s) SubCutaneous three times a day before meals  pantoprazole    Tablet 40 milliGRAM(s) Oral before breakfast  polyethylene glycol 3350 17 Gram(s) Oral daily  sodium chloride 0.9%. 1000 milliLiter(s) (10 mL/Hr) IV Continuous <Continuous>    MEDICATIONS  (PRN):  acetaminophen   Tablet .. 650 milliGRAM(s) Oral every 6 hours PRN Mild Pain (1 - 3)      LABS:                        9.3    10.83 )-----------( 89       ( 23 Jul 2020 13:15 )             26.8     07-23    140  |  106  |  11  ----------------------------<  219<H>  4.0   |  22  |  0.51    Ca    8.2<L>      23 Jul 2020 13:15  Phos  4.2     07-23  Mg     2.0     07-23    TPro  4.6<L>  /  Alb  3.0<L>  /  TBili  0.7  /  DBili  x   /  AST  118<H>  /  ALT  23  /  AlkPhos  72  07-23    LIVER FUNCTIONS - ( 23 Jul 2020 02:13 )  Alb: 3.0 g/dL / Pro: 4.6 g/dL / ALK PHOS: 72 U/L / ALT: 23 U/L / AST: 118 U/L / GGT: x           PT/INR - ( 23 Jul 2020 13:15 )   PT: 14.6 sec;   INR: 1.23          PTT - ( 23 Jul 2020 13:15 )  PTT:24.6 sec  ABG - ( 23 Jul 2020 12:45 )  pH, Arterial: 7.49  pH, Blood: x     /  pCO2: 27    /  pO2: 70    / HCO3: 20    / Base Excess: -2.5  /  SaO2: 95                  _________________________

## 2020-07-23 NOTE — PHYSICAL THERAPY INITIAL EVALUATION ADULT - ADDITIONAL COMMENTS
Pt lives in apartment with 4 + 6 TAMI with family (daughter, 2 nephews 18 and 15, sister [address] the [address]). At baseline pt reports being able to transfers with RW to WC (without prosthesis), self toilet, and transfer to showerchair. Pt has assist with dressing, bathing. Reports assist of 2 to negotiate stairs. Having HPT following rehab (nov 2019-jan 2020 post AKA). Owns showerchair, WC, RW.

## 2020-07-23 NOTE — PHYSICAL THERAPY INITIAL EVALUATION ADULT - IMPAIRMENTS FOUND, PT EVAL
aerobic capacity/endurance/ROM/gait, locomotion, and balance/ergonomics and body mechanics/muscle strength/sternal precautions limiting ability to safely use UE force on RW to hop without prosthesis (baseline function)/posture

## 2020-07-24 LAB
ALBUMIN SERPL ELPH-MCNC: 3.3 G/DL — SIGNIFICANT CHANGE UP (ref 3.3–5)
ALP SERPL-CCNC: 138 U/L — HIGH (ref 40–120)
ALT FLD-CCNC: 42 U/L — SIGNIFICANT CHANGE UP (ref 10–45)
ANION GAP SERPL CALC-SCNC: 9 MMOL/L — SIGNIFICANT CHANGE UP (ref 5–17)
APTT BLD: 28.2 SEC — SIGNIFICANT CHANGE UP (ref 27.5–35.5)
AST SERPL-CCNC: 170 U/L — HIGH (ref 10–40)
BILIRUB SERPL-MCNC: 0.8 MG/DL — SIGNIFICANT CHANGE UP (ref 0.2–1.2)
BUN SERPL-MCNC: 14 MG/DL — SIGNIFICANT CHANGE UP (ref 7–23)
CALCIUM SERPL-MCNC: 8.4 MG/DL — SIGNIFICANT CHANGE UP (ref 8.4–10.5)
CHLORIDE SERPL-SCNC: 108 MMOL/L — SIGNIFICANT CHANGE UP (ref 96–108)
CO2 SERPL-SCNC: 27 MMOL/L — SIGNIFICANT CHANGE UP (ref 22–31)
CREAT SERPL-MCNC: 0.59 MG/DL — SIGNIFICANT CHANGE UP (ref 0.5–1.3)
GLUCOSE BLDC GLUCOMTR-MCNC: 204 MG/DL — HIGH (ref 70–99)
GLUCOSE BLDC GLUCOMTR-MCNC: 214 MG/DL — HIGH (ref 70–99)
GLUCOSE BLDC GLUCOMTR-MCNC: 218 MG/DL — HIGH (ref 70–99)
GLUCOSE BLDC GLUCOMTR-MCNC: 301 MG/DL — HIGH (ref 70–99)
GLUCOSE SERPL-MCNC: 174 MG/DL — HIGH (ref 70–99)
HCT VFR BLD CALC: 24.5 % — LOW (ref 34.5–45)
HGB BLD-MCNC: 8.3 G/DL — LOW (ref 11.5–15.5)
INR BLD: 1.31 — HIGH (ref 0.88–1.16)
MAGNESIUM SERPL-MCNC: 2 MG/DL — SIGNIFICANT CHANGE UP (ref 1.6–2.6)
MCHC RBC-ENTMCNC: 27.8 PG — SIGNIFICANT CHANGE UP (ref 27–34)
MCHC RBC-ENTMCNC: 33.9 GM/DL — SIGNIFICANT CHANGE UP (ref 32–36)
MCV RBC AUTO: 81.9 FL — SIGNIFICANT CHANGE UP (ref 80–100)
NRBC # BLD: 0 /100 WBCS — SIGNIFICANT CHANGE UP (ref 0–0)
PHOSPHATE SERPL-MCNC: 3.4 MG/DL — SIGNIFICANT CHANGE UP (ref 2.5–4.5)
PLATELET # BLD AUTO: 79 K/UL — LOW (ref 150–400)
POTASSIUM SERPL-MCNC: 3.9 MMOL/L — SIGNIFICANT CHANGE UP (ref 3.5–5.3)
POTASSIUM SERPL-SCNC: 3.9 MMOL/L — SIGNIFICANT CHANGE UP (ref 3.5–5.3)
PROT SERPL-MCNC: 5.3 G/DL — LOW (ref 6–8.3)
PROTHROM AB SERPL-ACNC: 15.5 SEC — HIGH (ref 10.6–13.6)
RBC # BLD: 2.99 M/UL — LOW (ref 3.8–5.2)
RBC # FLD: 15 % — HIGH (ref 10.3–14.5)
SODIUM SERPL-SCNC: 144 MMOL/L — SIGNIFICANT CHANGE UP (ref 135–145)
WBC # BLD: 10.91 K/UL — HIGH (ref 3.8–10.5)
WBC # FLD AUTO: 10.91 K/UL — HIGH (ref 3.8–10.5)

## 2020-07-24 PROCEDURE — 71045 X-RAY EXAM CHEST 1 VIEW: CPT | Mod: 26

## 2020-07-24 PROCEDURE — 99222 1ST HOSP IP/OBS MODERATE 55: CPT | Mod: GC

## 2020-07-24 PROCEDURE — 99291 CRITICAL CARE FIRST HOUR: CPT

## 2020-07-24 PROCEDURE — 33274 TCAT INSJ/RPL PERM LDLS PM: CPT

## 2020-07-24 RX ORDER — ALBUMIN HUMAN 25 %
250 VIAL (ML) INTRAVENOUS ONCE
Refills: 0 | Status: COMPLETED | OUTPATIENT
Start: 2020-07-24 | End: 2020-07-24

## 2020-07-24 RX ORDER — METOPROLOL TARTRATE 50 MG
12.5 TABLET ORAL EVERY 6 HOURS
Refills: 0 | Status: DISCONTINUED | OUTPATIENT
Start: 2020-07-24 | End: 2020-07-25

## 2020-07-24 RX ORDER — FUROSEMIDE 40 MG
20 TABLET ORAL ONCE
Refills: 0 | Status: COMPLETED | OUTPATIENT
Start: 2020-07-24 | End: 2020-07-24

## 2020-07-24 RX ORDER — HYDRALAZINE HCL 50 MG
5 TABLET ORAL ONCE
Refills: 0 | Status: COMPLETED | OUTPATIENT
Start: 2020-07-24 | End: 2020-07-24

## 2020-07-24 RX ORDER — PANTOPRAZOLE SODIUM 20 MG/1
40 TABLET, DELAYED RELEASE ORAL EVERY 12 HOURS
Refills: 0 | Status: DISCONTINUED | OUTPATIENT
Start: 2020-07-24 | End: 2020-07-24

## 2020-07-24 RX ORDER — INSULIN GLARGINE 100 [IU]/ML
38 INJECTION, SOLUTION SUBCUTANEOUS AT BEDTIME
Refills: 0 | Status: DISCONTINUED | OUTPATIENT
Start: 2020-07-24 | End: 2020-07-25

## 2020-07-24 RX ORDER — INSULIN LISPRO 100/ML
8 VIAL (ML) SUBCUTANEOUS
Refills: 0 | Status: DISCONTINUED | OUTPATIENT
Start: 2020-07-24 | End: 2020-07-25

## 2020-07-24 RX ORDER — PANTOPRAZOLE SODIUM 20 MG/1
40 TABLET, DELAYED RELEASE ORAL
Refills: 0 | Status: DISCONTINUED | OUTPATIENT
Start: 2020-07-24 | End: 2020-07-31

## 2020-07-24 RX ORDER — ISOPROPYL ALCOHOL, BENZOCAINE .7; .06 ML/ML; ML/ML
1 SWAB TOPICAL
Qty: 100 | Refills: 1
Start: 2020-07-24 | End: 2020-09-11

## 2020-07-24 RX ADMIN — HEPARIN SODIUM 5000 UNIT(S): 5000 INJECTION INTRAVENOUS; SUBCUTANEOUS at 21:52

## 2020-07-24 RX ADMIN — POLYETHYLENE GLYCOL 3350 17 GRAM(S): 17 POWDER, FOR SOLUTION ORAL at 18:07

## 2020-07-24 RX ADMIN — Medication 4: at 12:30

## 2020-07-24 RX ADMIN — Medication 125 MILLILITER(S): at 06:45

## 2020-07-24 RX ADMIN — Medication 5 MILLIGRAM(S): at 09:00

## 2020-07-24 RX ADMIN — Medication 4: at 15:47

## 2020-07-24 RX ADMIN — HEPARIN SODIUM 5000 UNIT(S): 5000 INJECTION INTRAVENOUS; SUBCUTANEOUS at 06:09

## 2020-07-24 RX ADMIN — Medication 8: at 21:52

## 2020-07-24 RX ADMIN — Medication 20 MILLIGRAM(S): at 17:24

## 2020-07-24 RX ADMIN — Medication 20 MILLIGRAM(S): at 04:21

## 2020-07-24 RX ADMIN — Medication 100 MILLIGRAM(S): at 06:10

## 2020-07-24 RX ADMIN — Medication 125 MILLILITER(S): at 14:19

## 2020-07-24 RX ADMIN — Medication 12.5 MILLIGRAM(S): at 15:43

## 2020-07-24 RX ADMIN — Medication 125 MILLILITER(S): at 08:05

## 2020-07-24 RX ADMIN — HEPARIN SODIUM 5000 UNIT(S): 5000 INJECTION INTRAVENOUS; SUBCUTANEOUS at 13:51

## 2020-07-24 RX ADMIN — PANTOPRAZOLE SODIUM 40 MILLIGRAM(S): 20 TABLET, DELAYED RELEASE ORAL at 06:10

## 2020-07-24 RX ADMIN — Medication 12.5 MILLIGRAM(S): at 23:36

## 2020-07-24 RX ADMIN — Medication 5 MILLIGRAM(S): at 06:40

## 2020-07-24 RX ADMIN — ATORVASTATIN CALCIUM 40 MILLIGRAM(S): 80 TABLET, FILM COATED ORAL at 21:52

## 2020-07-24 RX ADMIN — CHLORHEXIDINE GLUCONATE 1 APPLICATION(S): 213 SOLUTION TOPICAL at 23:40

## 2020-07-24 RX ADMIN — INSULIN GLARGINE 38 UNIT(S): 100 INJECTION, SOLUTION SUBCUTANEOUS at 21:52

## 2020-07-24 RX ADMIN — Medication 81 MILLIGRAM(S): at 15:43

## 2020-07-24 RX ADMIN — Medication 7 UNIT(S): at 15:47

## 2020-07-24 NOTE — CONSULT NOTE ADULT - ATTENDING COMMENTS
Pt seen on rounds this afternoon.  Has a 40-year history of type 2 DM, severe PAD (s/p endovascular intervention) and CAD (now s/p CABG).  Required insulin drip post-op, now transitioned to basal/bolus.  Glycemic control was poor prior to admission, though unclear why pt was taking lispro only before lunch and supper.    With AM fingerstick still > 200 today after 30 Lantus, increase to 38 for tonight.  Can also increase the pre-meal lispro to 8 units--post-prandial fingersticks also > 200 and PO intake is improving

## 2020-07-24 NOTE — DIETITIAN INITIAL EVALUATION ADULT. - ADD RECOMMEND
1. Reinforce DM ed 2. Manage pain prn 3. Trend wts 4. Encourage intake through day 5. Monitor and replete lytes 6. Check POC q6hrs

## 2020-07-24 NOTE — PROGRESS NOTE ADULT - SUBJECTIVE AND OBJECTIVE BOX
CESARIO [place]  [id number]    PROCEDURE:  s/p implant of AV Micra leadless pacemaker      INDICATION:  high grade AV ablock      ELECTROPHYSIOLOGIST(S):  Dr. Mason      ANESTHESIOLOGY:  GA      FINDINGS:  high grade AV block      COMPLICATIONS:  none      RECOMMENDATIONS:  - s/p leadless Micra AV pacemaker implant  - patient had a suture in Rt. femoral vein and I just removed it, site looks excellent, No bleeding/hematoma/bruises     call EP with any Qs.

## 2020-07-24 NOTE — PROGRESS NOTE ADULT - SUBJECTIVE AND OBJECTIVE BOX
CTICU  CRITICAL  CARE  attending     Hand off received 					   Pertinent clinical, laboratory, radiographic, hemodynamic, echocardiographic, respiratory data, microbiologic data and chart were reviewed and analyzed frequently throughout the course of the day and night  Patient seen and examined with CTS/ SH attending at bedside    Pt is a 75y , Female, post op day # 2 s/p CABG x 2V    post op:    Bradyarrhythmia  Pacer dependant  s/p PPM today  acute post H'gic anemia        AV block: AV block      , FAMILY HISTORY:  PAST MEDICAL & SURGICAL HISTORY:  History of osteomyelitis  CAD (coronary artery disease)  PAD (peripheral artery disease)  Diabetes mellitus  HLD (hyperlipidemia)  HTN (hypertension)    Patient is a 75y old  Female who presents with a chief complaint of CAD (2020 09:25)      14 system review was unremarkable    Vital signs, hemodynamic and respiratory parameters were reviewed from the bedside nursing flowsheet.  ICU Vital Signs Last 24 Hrs  T(C): 36.4 (2020 09:00), Max: 36.6 (2020 21:00)  T(F): 97.5 (2020 09:00), Max: 97.8 (2020 21:00)  HR: 71 (2020 14:00) (53 - 98)  BP: 144/68 (2020 08:00) (129/58 - 167/76)  BP(mean): 98 (2020 08:00) (84 - 109)  ABP: 169/67 (2020 14:00) (101/81 - 204/62)  ABP(mean): 95 (2020 14:00) (77 - 98)  RR: 12 (2020 14:00) (12 - 20)  SpO2: 97% (2020 14:00) (92% - 100%)    Adult Advanced Hemodynamics Last 24 Hrs  CVP(mm Hg): 32 (2020 14:00) (4 - 32)  CVP(cm H2O): --  CO: --  CI: --  PA: --  PA(mean): --  PCWP: --  SVR: --  SVRI: --  PVR: --  PVRI: --, ABG - ( 2020 12:45 )  pH, Arterial: 7.49  pH, Blood: x     /  pCO2: 27    /  pO2: 70    / HCO3: 20    / Base Excess: -2.5  /  SaO2: 95                  Intake and output was reviewed and the fluid balance was calculated  Daily     Daily Weight in k (2020 12:55)  I&O's Summary    2020 07:  -  2020 07:00  --------------------------------------------------------  IN: 1480 mL / OUT: 1385 mL / NET: 95 mL    2020 07:  -  2020 14:37  --------------------------------------------------------  IN: 550 mL / OUT: 165 mL / NET: 385 mL        All lines and drain sites were assessed  [facility]      POCT Blood Glucose.: 214 mg/dL (2020 12:27)    No acute change in mental status  Auscultation of the chest reveals equal bs  Abdomen is soft  Extremities are warm and well perfused  Wounds appear clean and unremarkable  Antibiotics are periop    labs  CBC Full  -  ( 2020 02:24 )  WBC Count : 10.91 K/uL  RBC Count : 2.99 M/uL  Hemoglobin : 8.3 g/dL  Hematocrit : 24.5 %  Platelet Count - Automated : 79 K/uL  Mean Cell Volume : 81.9 fl  Mean Cell Hemoglobin : 27.8 pg  Mean Cell Hemoglobin Concentration : 33.9 gm/dL  Auto Neutrophil # : x  Auto Lymphocyte # : x  Auto Monocyte # : x  Auto Eosinophil # : x  Auto Basophil # : x  Auto Neutrophil % : x  Auto Lymphocyte % : x  Auto Monocyte % : x  Auto Eosinophil % : x  Auto Basophil % : x    24    144  |  108  |  14  ----------------------------<  174<H>  3.9   |  27  |  0.59    Ca    8.4      2020 02:24  Phos  3.4     07-24  Mg     2.0     07-24    TPro  5.3<L>  /  Alb  3.3  /  TBili  0.8  /  DBili  x   /  AST  170<H>  /  ALT  42  /  AlkPhos  138<H>  07-24    PT/INR - ( 2020 02:24 )   PT: 15.5 sec;   INR: 1.31          PTT - ( 2020 02:24 )  PTT:28.2 sec  The current medications were reviewed   MEDICATIONS  (STANDING):  aspirin enteric coated 81 milliGRAM(s) Oral daily  atorvastatin 40 milliGRAM(s) Oral at bedtime  chlorhexidine 2% Cloths 1 Application(s) Topical daily  dextrose 50% Injectable 25 Gram(s) IV Push once  dextrose 50% Injectable 50 milliLiter(s) IV Push every 15 minutes  dextrose 50% Injectable 25 milliLiter(s) IV Push every 15 minutes  heparin   Injectable 5000 Unit(s) SubCutaneous every 8 hours  insulin glargine Injectable (LANTUS) 30 Unit(s) SubCutaneous at bedtime  insulin lispro (HumaLOG) corrective regimen sliding scale   SubCutaneous Before meals and at bedtime  insulin lispro Injectable (HumaLOG) 7 Unit(s) SubCutaneous three times a day before meals  pantoprazole    Tablet 40 milliGRAM(s) Oral before breakfast  polyethylene glycol 3350 17 Gram(s) Oral daily  sodium chloride 0.9%. 1000 milliLiter(s) (10 mL/Hr) IV Continuous <Continuous>    MEDICATIONS  (PRN):  acetaminophen   Tablet .. 650 milliGRAM(s) Oral every 6 hours PRN Mild Pain (1 - 3)       PROBLEM LIST/ ASSESSMENT:  HEALTH ISSUES - PROBLEM Dx:  AV block: AV block      ,   Patient is a 75y old  Female who presents with a chief complaint of CAD (2020 09:25)     s/p CABG      My plan includes :  close hemodynamic, ventilatory and drain monitoring and management per post op routine    Monitor for arrhythmias and monitor parameters for organ perfusion  monitor neurologic status  Head of the bed should remain elevated to 45 deg .   chest PT and IS will be encouraged  monitor adequacy of oxygenation and ventilation and attempt to wean oxygen  Nutritional goals will be met using po eventually , ensure adequate caloric intake and montior the same  Stress ulcer and VTE prophylaxis will be achieved    Glycemic control is satisfactory  Electrolytes have been repleted as necessary and wound care has been carried out. Pain control has been achieved.   agressive physical therapy and early mobility and ambulation goals will be met   The family was updated about the course and plan  CRITICAL CARE TIME SPENT in evaluation and management, reassessments, review and interpretation of labs and x-rays, ventilator and hemodynamic management, formulating a plan and coordinating care: __55___ MIN.  Time does not include procedural time.  CTICU ATTENDING     					    William Covington MD

## 2020-07-24 NOTE — DIETITIAN INITIAL EVALUATION ADULT. - OTHER INFO
75F POOR HISTORIAN with PMHx known 3VCAD, HTN, HLD, Uncontrolled Insulin Dependent DM II (Hgb A1C 11.3% on 7/17/2020), PAD, Osteomyelitis s/p AKA LLE 11/2019 who presented to cardiologist for follow-up. Patient reports SOB after climbing 1-2 flights of stairs resolved with rest. She denies chest pain, palpitations, dizziness, syncope, orthopnea, PND. Does however report RLE dependent edema. In light of pt's risk factors, known 3VCAD, CCS Angina Class III Equivalent Symptoms, she is now sp cardiac cath 7/17/20, revealing severe 3VCAD. Presents for elective surgery. On 7/22 underwent CABG w FRANCES, now off floor this AM 7/24 for PPM placement. Plans to [facility] next week. No recorded n/v/d/c, skin with surgical incision to midline, claire score 13, no chewing/ swallowing issues noted. NKFA. Unsure of UBW. NPO at this time for pacemaker placement. Does not follow dietary guidelines at home, uses insulin to manage BS. Discussed importance of maintaining BS for post-op healing. Recommend c/w cst cho diet upon advancement from NPO. Will continue to follow per protocol.

## 2020-07-24 NOTE — DIETITIAN INITIAL EVALUATION ADULT. - ENERGY NEEDS
Adj BW used as pt w AKA, Adj bw = 49kg  ABW 81kg, IBW 52kg, 155% IBW, ht 63", BMI 31.6   Nutrient needs based on [facility] standards of care for maintenance in adults, adjusted for post-op demand, fluid per team

## 2020-07-24 NOTE — CONSULT NOTE ADULT - SUBJECTIVE AND OBJECTIVE BOX
HPI: 75yFemale    Age at Dx:  How dx:  Hx and duration of insulin:  Current Therapy:  Hx of hypoglycemia  Hx of DKA/HHS?    Home FSG:  Fasting  Lunch  Dinner  Bed    Hx of other regimens  Complications:  Outpatient Endo:    PMH & Surgical Hx:I25.10  Handoff  History of osteomyelitis  CAD (coronary artery disease)  PAD (peripheral artery disease)  Diabetes mellitus  HLD (hyperlipidemia)  HTN (hypertension)  CAD, multiple vessel  CAD, multiple vessel  AV block  CABG, with FRANCES      FH:  DM:  Thyroid:  Autoimmune:  Other:    SH:  Smoking  Etoh:  Recreational Drugs:  Social Life:    Current Meds:  acetaminophen   Tablet .. 650 milliGRAM(s) Oral every 6 hours PRN  aspirin enteric coated 81 milliGRAM(s) Oral daily  atorvastatin 40 milliGRAM(s) Oral at bedtime  chlorhexidine 2% Cloths 1 Application(s) Topical daily  dextrose 50% Injectable 25 Gram(s) IV Push once  dextrose 50% Injectable 50 milliLiter(s) IV Push every 15 minutes  dextrose 50% Injectable 25 milliLiter(s) IV Push every 15 minutes  heparin   Injectable 5000 Unit(s) SubCutaneous every 8 hours  insulin glargine Injectable (LANTUS) 30 Unit(s) SubCutaneous at bedtime  insulin lispro (HumaLOG) corrective regimen sliding scale   SubCutaneous Before meals and at bedtime  insulin lispro Injectable (HumaLOG) 7 Unit(s) SubCutaneous three times a day before meals  pantoprazole    Tablet 40 milliGRAM(s) Oral before breakfast  polyethylene glycol 3350 17 Gram(s) Oral daily  sodium chloride 0.9%. 1000 milliLiter(s) IV Continuous <Continuous>      Allergies:  No Known Allergies      ROS:  Denies the following except as indicated.    General: weight loss/weight gain, decreased appetite, fatigue  Eyes: Blurry vision, double vision, visual changes  ENT: Throat pain, changes in voice,   CV: palpitations, SOB, CP, cough  GI: NVD, difficulty swallowing, abdominal pain  : polyuria, dysuria  Endo: abnormal menses, temperature intolerance, decreased libido  MSK: weakness, joint pain  Skin: rash, dryness, diaphoresis  Heme: Easy bruising,bleeding  Neuro: HA, dizziness, lightheadedness, numbness tingling  Psych: Anxiety, Depression    Vital Signs Last 24 Hrs  T(C): 36.2 (24 Jul 2020 05:00), Max: 36.6 (23 Jul 2020 21:00)  T(F): 97.1 (24 Jul 2020 05:00), Max: 97.8 (23 Jul 2020 21:00)  HR: 98 (24 Jul 2020 09:00) (51 - 98)  BP: 144/68 (24 Jul 2020 08:00) (114/73 - 167/76)  BP(mean): 98 (24 Jul 2020 08:00) (84 - 109)  RR: 12 (24 Jul 2020 09:00) (12 - 20)  SpO2: 95% (24 Jul 2020 08:00) (93% - 100%)  Height (cm): 160 (07-22 @ 23:28)  Weight (kg): 81 (07-22 @ 23:28)  BMI (kg/m2): 31.6 (07-22 @ 23:28)      Constitutional: wn/wd in NAD.   HEENT: NCAT, MMM, OP clear, EOMI, , no proptosis or lid retraction  Neck: no thyromegaly or palpable thyroid nodules   Respiratory: lungs CTAB.  Cardiovascular: regular rhythm, normal S1 and S2, no audible murmurs, no peripheral edema  GI: soft, NT/ND, no masses/HSM appreciated.  Neurology: no tremors, DTR 2+  Skin: no visible rashes/lesions  Psychiatric: AAO x 3, normal affect/mood.  Ext: radial pulses intact, DP pulses intact, extremities warm, no cyanosis, clubbing or edema.       LABS:                        8.3    10.91 )-----------( 79       ( 24 Jul 2020 02:24 )             24.5     07-24    144  |  108  |  14  ----------------------------<  174<H>  3.9   |  27  |  0.59    Ca    8.4      24 Jul 2020 02:24  Phos  3.4     07-24  Mg     2.0     07-24    TPro  5.3<L>  /  Alb  3.3  /  TBili  0.8  /  DBili  x   /  AST  170<H>  /  ALT  42  /  AlkPhos  138<H>  07-24    PT/INR - ( 24 Jul 2020 02:24 )   PT: 15.5 sec;   INR: 1.31          PTT - ( 24 Jul 2020 02:24 )  PTT:28.2 sec      Thyroid Stimulating Hormone, Serum: 2.238 (07-17 @ 09:58)      RADIOLOGY & ADDITIONAL STUDIES:  CAPILLARY BLOOD GLUCOSE      POCT Blood Glucose.: 204 mg/dL (24 Jul 2020 07:15)  POCT Blood Glucose.: 224 mg/dL (23 Jul 2020 21:01)  POCT Blood Glucose.: 216 mg/dL (23 Jul 2020 17:31)  POCT Blood Glucose.: 221 mg/dL (23 Jul 2020 11:29)        A/P:75y Female    1.  DM  Please continue lantus       units at night / morning.  Please continue lispro      units before each meal.  Please continue lispro moderate / low dose sliding scale four times daily with meals and at bedtime    Pt's fingerstick glucose goal is     Will continue to monitor     For discharge, pt can continue    Pt can follow up at discharge with [facility] by calling  to make an appointment.   Will discuss case with     and update primary team HPI: 75yFemale with PMHx known 3VCAD (diagnostic cath 8/2019 @ [place]), HTN, HLD, Uncontrolled Insulin Dependent DM II (Hgb A1C 11.8% on 7/17/2020), PAD s/p LSFA/Popliteal/TP trunk and Posterior Tibial PTA and stent 8/2019  (s/p peripheral angiogram @ [place] 5/2020 with "ballooning and stenting" per patient- on Eliquis, Osteomyelitis s/p AKA LLE 11/2019 who presented to cardiologist Dr. Yepez for follow-up. Patient reported RIBEIRO and leg swelling. She denied chest pain, palpitations, dizziness, syncope.  s/p cardiac cath 7/17/20, revealing severe 3VCAD: dLM 40-50% (largely Ca++), pLAD 70% (largely Ca++), mid/distalLAD minor plaque, ostialD1 70% (medium sized vessel), ostialLCx 80% (largely Ca++), OM1/OM2 minor plaque (med size), pRCA 60% (large dominant), mRCA 70%, dRCA minor plaque, PDA/PLS minor plaque, EF 65%. Patient was admitted for CABG.       Age at Dx:  How dx:  Hx and duration of insulin:  Current Therapy:  Hx of hypoglycemia  Hx of DKA/HHS?    Home FSG:  Fasting  Lunch  Dinner  Bed    Hx of other regimens  Complications:  Outpatient Endo:    PMH & Surgical Hx:I25.10  Handoff  History of osteomyelitis  CAD (coronary artery disease)  PAD (peripheral artery disease)  Diabetes mellitus  HLD (hyperlipidemia)  HTN (hypertension)  CAD, multiple vessel  CAD, multiple vessel  AV block  CABG, with FRANCES      FH:  DM:  Thyroid:  Autoimmune:  Other:    SH:  Smoking  Etoh:  Recreational Drugs:  Social Life:    Current Meds:  acetaminophen   Tablet .. 650 milliGRAM(s) Oral every 6 hours PRN  aspirin enteric coated 81 milliGRAM(s) Oral daily  atorvastatin 40 milliGRAM(s) Oral at bedtime  chlorhexidine 2% Cloths 1 Application(s) Topical daily  dextrose 50% Injectable 25 Gram(s) IV Push once  dextrose 50% Injectable 50 milliLiter(s) IV Push every 15 minutes  dextrose 50% Injectable 25 milliLiter(s) IV Push every 15 minutes  heparin   Injectable 5000 Unit(s) SubCutaneous every 8 hours  insulin glargine Injectable (LANTUS) 30 Unit(s) SubCutaneous at bedtime  insulin lispro (HumaLOG) corrective regimen sliding scale   SubCutaneous Before meals and at bedtime  insulin lispro Injectable (HumaLOG) 7 Unit(s) SubCutaneous three times a day before meals  pantoprazole    Tablet 40 milliGRAM(s) Oral before breakfast  polyethylene glycol 3350 17 Gram(s) Oral daily  sodium chloride 0.9%. 1000 milliLiter(s) IV Continuous <Continuous>      Allergies:  No Known Allergies      ROS:  Denies the following except as indicated.    General: weight loss/weight gain, decreased appetite, fatigue  Eyes: Blurry vision, double vision, visual changes  ENT: Throat pain, changes in voice,   CV: palpitations, SOB, CP, cough  GI: NVD, difficulty swallowing, abdominal pain  : polyuria, dysuria  Endo: abnormal menses, temperature intolerance, decreased libido  MSK: weakness, joint pain  Skin: rash, dryness, diaphoresis  Heme: Easy bruising,bleeding  Neuro: HA, dizziness, lightheadedness, numbness tingling  Psych: Anxiety, Depression    Vital Signs Last 24 Hrs  T(C): 36.2 (24 Jul 2020 05:00), Max: 36.6 (23 Jul 2020 21:00)  T(F): 97.1 (24 Jul 2020 05:00), Max: 97.8 (23 Jul 2020 21:00)  HR: 98 (24 Jul 2020 09:00) (51 - 98)  BP: 144/68 (24 Jul 2020 08:00) (114/73 - 167/76)  BP(mean): 98 (24 Jul 2020 08:00) (84 - 109)  RR: 12 (24 Jul 2020 09:00) (12 - 20)  SpO2: 95% (24 Jul 2020 08:00) (93% - 100%)  Height (cm): 160 (07-22 @ 23:28)  Weight (kg): 81 (07-22 @ 23:28)  BMI (kg/m2): 31.6 (07-22 @ 23:28)      Constitutional: wn/wd in NAD.   HEENT: NCAT, MMM, OP clear, EOMI, , no proptosis or lid retraction  Neck: no thyromegaly or palpable thyroid nodules   Respiratory: lungs CTAB.  Cardiovascular: regular rhythm, normal S1 and S2, no audible murmurs, no peripheral edema  GI: soft, NT/ND, no masses/HSM appreciated.  Neurology: no tremors, DTR 2+  Skin: no visible rashes/lesions  Psychiatric: AAO x 3, normal affect/mood.  Ext: radial pulses intact, DP pulses intact, extremities warm, no cyanosis, clubbing or edema.       LABS:                        8.3    10.91 )-----------( 79       ( 24 Jul 2020 02:24 )             24.5     07-24    144  |  108  |  14  ----------------------------<  174<H>  3.9   |  27  |  0.59    Ca    8.4      24 Jul 2020 02:24  Phos  3.4     07-24  Mg     2.0     07-24    TPro  5.3<L>  /  Alb  3.3  /  TBili  0.8  /  DBili  x   /  AST  170<H>  /  ALT  42  /  AlkPhos  138<H>  07-24    PT/INR - ( 24 Jul 2020 02:24 )   PT: 15.5 sec;   INR: 1.31          PTT - ( 24 Jul 2020 02:24 )  PTT:28.2 sec      Thyroid Stimulating Hormone, Serum: 2.238 (07-17 @ 09:58)      RADIOLOGY & ADDITIONAL STUDIES:  CAPILLARY BLOOD GLUCOSE      POCT Blood Glucose.: 204 mg/dL (24 Jul 2020 07:15)  POCT Blood Glucose.: 224 mg/dL (23 Jul 2020 21:01)  POCT Blood Glucose.: 216 mg/dL (23 Jul 2020 17:31)  POCT Blood Glucose.: 221 mg/dL (23 Jul 2020 11:29)        A/P:75y Female    1.  DM  Please continue lantus       units at night / morning.  Please continue lispro      units before each meal.  Please continue lispro moderate / low dose sliding scale four times daily with meals and at bedtime    Pt's fingerstick glucose goal is     Will continue to monitor     For discharge, pt can continue    Pt can follow up at discharge with [facility] by calling  to make an appointment.   Will discuss case with     and update primary team HPI: 75yFemale with PMHx known 3VCAD (diagnostic cath 2019 @ [place]), HTN, HLD, Uncontrolled Insulin Dependent DM II (Hgb A1C 11.8% on 2020), PAD s/p LSFA/Popliteal/TP trunk and Posterior Tibial PTA and stent 2019  (s/p peripheral angiogram @ [place] 2020 with "ballooning and stenting" per patient- on Eliquis, Osteomyelitis s/p AKA LLE 2019 who presented to cardiologist Dr. Yepez for follow-up. Patient reported RIBEIRO and leg swelling. She denied chest pain, palpitations, dizziness, syncope.  s/p cardiac cath 20, revealing severe 3VCAD: dLM 40-50% (largely Ca++), pLAD 70% (largely Ca++), mid/distalLAD minor plaque, ostialD1 70% (medium sized vessel), ostialLCx 80% (largely Ca++), OM1/OM2 minor plaque (med size), pRCA 60% (large dominant), mRCA 70%, dRCA minor plaque, PDA/PLS minor plaque, EF 65%. Patient was admitted for CABG. s/p CABG on 2020.   undergoing endocrine consult for DM management. Pt was started on insulin gtt POST OP, at rate of 2.5 u/hr at . Stopped at 3AM on . Bridged with NPH 15 units. Started on 30 units of lantus and 7 units lispro TID with meals.   FSG & Insulin received:    Yesterday:  pre-dinner fs  4   units lispro SS  bedtime fs  lantus  30 units +  4  units lispro SS    Today:  pre-breakfast fs  4    units lispro SS  pre-lunch fs        Age at Dx: 42 years ago  How dx: regualr blood work   Hx and duration of insulin: on latus 25 units and 8 units novologg with lunch and dinner.   Hx of hypoglycemia:denie  Hx of DKA/HHS? denies    Home FSG: not checking regularly   breakfast: boiled eggs, whole wheat bread  Lunch: fish and mashed potato   Dinner: chicken       Hx of other regimens  Complications: cataract       PMH & Surgical Hx:I25.10  Handoff  History of osteomyelitis  CAD (coronary artery disease)  PAD (peripheral artery disease)  Diabetes mellitus  HLD (hyperlipidemia)  HTN (hypertension)  CAD, multiple vessel  CAD, multiple vessel  AV block  CABG, with FRANCES      FH:  DM: denies  Thyroid in sister     SH:neg for  Smoking, Etoh and Recreational Drugs:  Social Life: lives with her daughter     Current Meds:  acetaminophen   Tablet .. 650 milliGRAM(s) Oral every 6 hours PRN  aspirin enteric coated 81 milliGRAM(s) Oral daily  atorvastatin 40 milliGRAM(s) Oral at bedtime  chlorhexidine 2% Cloths 1 Application(s) Topical daily  dextrose 50% Injectable 25 Gram(s) IV Push once  dextrose 50% Injectable 50 milliLiter(s) IV Push every 15 minutes  dextrose 50% Injectable 25 milliLiter(s) IV Push every 15 minutes  heparin   Injectable 5000 Unit(s) SubCutaneous every 8 hours  insulin glargine Injectable (LANTUS) 30 Unit(s) SubCutaneous at bedtime  insulin lispro (HumaLOG) corrective regimen sliding scale   SubCutaneous Before meals and at bedtime  insulin lispro Injectable (HumaLOG) 7 Unit(s) SubCutaneous three times a day before meals  pantoprazole    Tablet 40 milliGRAM(s) Oral before breakfast  polyethylene glycol 3350 17 Gram(s) Oral daily  sodium chloride 0.9%. 1000 milliLiter(s) IV Continuous <Continuous>      Allergies:  No Known Allergies      ROS:  Denies the following except as indicated.    General: weight loss/weight gain, decreased appetite, fatigue  CV: palpitations, SOB, CP, cough  GI: NVD, difficulty swallowing, abdominal pain  : polyuria, dysuria  MSK: weakness, joint pain  Heme: Easy bruising,bleeding  Neuro: HA, dizziness  Psych: Anxiety, Depression    Vital Signs Last 24 Hrs  T(C): 36.2 (2020 05:00), Max: 36.6 (2020 21:00)  T(F): 97.1 (2020 05:00), Max: 97.8 (2020 21:00)  HR: 98 (2020 09:00) (51 - 98)  BP: 144/68 (2020 08:00) (114/73 - 167/76)  BP(mean): 98 (2020 08:00) (84 - 109)  RR: 12 (2020 09:00) (12 - 20)  SpO2: 95% (2020 08:00) (93% - 100%)  Height (cm): 160 ( @ 23:28)  Weight (kg): 81 ( @ 23:28)  BMI (kg/m2): 31.6 ( @ 23:28)      Constitutional:  in NAD.   Neck: no thyromegaly or palpable thyroid nodules   Respiratory: lungs CTAB.  Cardiovascular: normal S1 and S2, s/p CACBG   GI: soft, NT/ND, no masses/HSM appreciated.  Skin: no visible rashes/lesions  Psychiatric: AAO x 3, normal affect/mood.  Ext: no edema.       LABS:                        8.3    10.91 )-----------( 79       ( 2020 02:24 )             24.5         144  |  108  |  14  ----------------------------<  174<H>  3.9   |  27  |  0.59    Ca    8.4      2020 02:24  Phos  3.4       Mg     2.0         TPro  5.3<L>  /  Alb  3.3  /  TBili  0.8  /  DBili  x   /  AST  170<H>  /  ALT  42  /  AlkPhos  138<H>  24    PT/INR - ( 2020 02:24 )   PT: 15.5 sec;   INR: 1.31          PTT - ( 2020 02:24 )  PTT:28.2 sec      Thyroid Stimulating Hormone, Serum: 2.238 ( @ 09:58)      RADIOLOGY & ADDITIONAL STUDIES:  CAPILLARY BLOOD GLUCOSE      POCT Blood Glucose.: 204 mg/dL (2020 07:15)  POCT Blood Glucose.: 224 mg/dL (2020 21:01)  POCT Blood Glucose.: 216 mg/dL (2020 17:31)  POCT Blood Glucose.: 221 mg/dL (2020 11:29)        A/P:75y Female with PMHx known 3VCAD (diagnostic cath 2019 @ [place]), HTN, HLD, Uncontrolled Insulin Dependent DM II (Hgb A1C 11.8% on 2020), PAD s/p LSFA/Popliteal/TP trunk and Posterior Tibial PTA and stent 2019  (s/p peripheral angiogram @ [place] 2020 with "ballooning and stenting" per patient- on Eliquis, Osteomyelitis s/p AKA LLE 2019 who presented to cardiologist Dr. Yepez for follow-up.  s/p CABG on 2020. s/p insulin drip. undergoing endocrine consult for DM management.    1.  DM  Hgb A1C 11.8  Please increase lantus to 38 units at night.   Please increase lispro  to 8   units before each meal.    Please continue lispro moderate sliding scale four times daily with meals and at bedtime    Pt's fingerstick glucose goal is 80- 180    Will continue to monitor     For discharge, pt can continue    Pt can follow up at discharge with [facility] by calling  to make an appointment.   Will discuss case with Dr. stratton   and update primary team

## 2020-07-25 LAB
ALBUMIN SERPL ELPH-MCNC: 3.3 G/DL — SIGNIFICANT CHANGE UP (ref 3.3–5)
ALBUMIN SERPL ELPH-MCNC: 3.5 G/DL — SIGNIFICANT CHANGE UP (ref 3.3–5)
ALP SERPL-CCNC: 576 U/L — HIGH (ref 40–120)
ALP SERPL-CCNC: 627 U/L — HIGH (ref 40–120)
ALT FLD-CCNC: 113 U/L — HIGH (ref 10–45)
ALT FLD-CCNC: 128 U/L — HIGH (ref 10–45)
ANION GAP SERPL CALC-SCNC: 11 MMOL/L — SIGNIFICANT CHANGE UP (ref 5–17)
ANION GAP SERPL CALC-SCNC: 9 MMOL/L — SIGNIFICANT CHANGE UP (ref 5–17)
APTT BLD: 27.4 SEC — LOW (ref 27.5–35.5)
APTT BLD: 29 SEC — SIGNIFICANT CHANGE UP (ref 27.5–35.5)
AST SERPL-CCNC: 154 U/L — HIGH (ref 10–40)
AST SERPL-CCNC: 228 U/L — HIGH (ref 10–40)
BILIRUB SERPL-MCNC: 1.1 MG/DL — SIGNIFICANT CHANGE UP (ref 0.2–1.2)
BILIRUB SERPL-MCNC: 1.2 MG/DL — SIGNIFICANT CHANGE UP (ref 0.2–1.2)
BUN SERPL-MCNC: 18 MG/DL — SIGNIFICANT CHANGE UP (ref 7–23)
BUN SERPL-MCNC: 21 MG/DL — SIGNIFICANT CHANGE UP (ref 7–23)
CALCIUM SERPL-MCNC: 7.9 MG/DL — LOW (ref 8.4–10.5)
CALCIUM SERPL-MCNC: 8.3 MG/DL — LOW (ref 8.4–10.5)
CHLORIDE SERPL-SCNC: 105 MMOL/L — SIGNIFICANT CHANGE UP (ref 96–108)
CHLORIDE SERPL-SCNC: 108 MMOL/L — SIGNIFICANT CHANGE UP (ref 96–108)
CO2 SERPL-SCNC: 24 MMOL/L — SIGNIFICANT CHANGE UP (ref 22–31)
CO2 SERPL-SCNC: 25 MMOL/L — SIGNIFICANT CHANGE UP (ref 22–31)
CREAT SERPL-MCNC: 0.61 MG/DL — SIGNIFICANT CHANGE UP (ref 0.5–1.3)
CREAT SERPL-MCNC: 0.65 MG/DL — SIGNIFICANT CHANGE UP (ref 0.5–1.3)
GLUCOSE BLDC GLUCOMTR-MCNC: 133 MG/DL — HIGH (ref 70–99)
GLUCOSE BLDC GLUCOMTR-MCNC: 135 MG/DL — HIGH (ref 70–99)
GLUCOSE BLDC GLUCOMTR-MCNC: 136 MG/DL — HIGH (ref 70–99)
GLUCOSE BLDC GLUCOMTR-MCNC: 88 MG/DL — SIGNIFICANT CHANGE UP (ref 70–99)
GLUCOSE SERPL-MCNC: 212 MG/DL — HIGH (ref 70–99)
GLUCOSE SERPL-MCNC: 93 MG/DL — SIGNIFICANT CHANGE UP (ref 70–99)
HCT VFR BLD CALC: 26.2 % — LOW (ref 34.5–45)
HCT VFR BLD CALC: 27.2 % — LOW (ref 34.5–45)
HGB BLD-MCNC: 8.7 G/DL — LOW (ref 11.5–15.5)
HGB BLD-MCNC: 9 G/DL — LOW (ref 11.5–15.5)
INR BLD: 1.4 — HIGH (ref 0.88–1.16)
INR BLD: 1.46 — HIGH (ref 0.88–1.16)
LACTATE SERPL-SCNC: 1.8 MMOL/L — SIGNIFICANT CHANGE UP (ref 0.5–2)
MAGNESIUM SERPL-MCNC: 2 MG/DL — SIGNIFICANT CHANGE UP (ref 1.6–2.6)
MAGNESIUM SERPL-MCNC: 2.1 MG/DL — SIGNIFICANT CHANGE UP (ref 1.6–2.6)
MCHC RBC-ENTMCNC: 28.1 PG — SIGNIFICANT CHANGE UP (ref 27–34)
MCHC RBC-ENTMCNC: 28.5 PG — SIGNIFICANT CHANGE UP (ref 27–34)
MCHC RBC-ENTMCNC: 33.1 GM/DL — SIGNIFICANT CHANGE UP (ref 32–36)
MCHC RBC-ENTMCNC: 33.2 GM/DL — SIGNIFICANT CHANGE UP (ref 32–36)
MCV RBC AUTO: 84.5 FL — SIGNIFICANT CHANGE UP (ref 80–100)
MCV RBC AUTO: 86.1 FL — SIGNIFICANT CHANGE UP (ref 80–100)
NRBC # BLD: 0 /100 WBCS — SIGNIFICANT CHANGE UP (ref 0–0)
NRBC # BLD: 0 /100 WBCS — SIGNIFICANT CHANGE UP (ref 0–0)
PHOSPHATE SERPL-MCNC: 1.9 MG/DL — LOW (ref 2.5–4.5)
PLATELET # BLD AUTO: 73 K/UL — LOW (ref 150–400)
PLATELET # BLD AUTO: 95 K/UL — LOW (ref 150–400)
POTASSIUM SERPL-MCNC: 3.9 MMOL/L — SIGNIFICANT CHANGE UP (ref 3.5–5.3)
POTASSIUM SERPL-MCNC: 3.9 MMOL/L — SIGNIFICANT CHANGE UP (ref 3.5–5.3)
POTASSIUM SERPL-SCNC: 3.9 MMOL/L — SIGNIFICANT CHANGE UP (ref 3.5–5.3)
POTASSIUM SERPL-SCNC: 3.9 MMOL/L — SIGNIFICANT CHANGE UP (ref 3.5–5.3)
PROT SERPL-MCNC: 5.2 G/DL — LOW (ref 6–8.3)
PROT SERPL-MCNC: 5.6 G/DL — LOW (ref 6–8.3)
PROTHROM AB SERPL-ACNC: 16.5 SEC — HIGH (ref 10.6–13.6)
PROTHROM AB SERPL-ACNC: 17.2 SEC — HIGH (ref 10.6–13.6)
RBC # BLD: 3.1 M/UL — LOW (ref 3.8–5.2)
RBC # BLD: 3.16 M/UL — LOW (ref 3.8–5.2)
RBC # FLD: 14.9 % — HIGH (ref 10.3–14.5)
RBC # FLD: 15.1 % — HIGH (ref 10.3–14.5)
SODIUM SERPL-SCNC: 140 MMOL/L — SIGNIFICANT CHANGE UP (ref 135–145)
SODIUM SERPL-SCNC: 142 MMOL/L — SIGNIFICANT CHANGE UP (ref 135–145)
WBC # BLD: 12.32 K/UL — HIGH (ref 3.8–10.5)
WBC # BLD: 13.92 K/UL — HIGH (ref 3.8–10.5)
WBC # FLD AUTO: 12.32 K/UL — HIGH (ref 3.8–10.5)
WBC # FLD AUTO: 13.92 K/UL — HIGH (ref 3.8–10.5)

## 2020-07-25 PROCEDURE — 99232 SBSQ HOSP IP/OBS MODERATE 35: CPT

## 2020-07-25 PROCEDURE — 99291 CRITICAL CARE FIRST HOUR: CPT

## 2020-07-25 PROCEDURE — 71045 X-RAY EXAM CHEST 1 VIEW: CPT | Mod: 26

## 2020-07-25 PROCEDURE — 93279 PRGRMG DEV EVAL PM/LDLS PM: CPT | Mod: 26

## 2020-07-25 PROCEDURE — 76604 US EXAM CHEST: CPT | Mod: 26

## 2020-07-25 PROCEDURE — 76705 ECHO EXAM OF ABDOMEN: CPT | Mod: 26

## 2020-07-25 RX ORDER — OXYCODONE HYDROCHLORIDE 5 MG/1
5 TABLET ORAL EVERY 6 HOURS
Refills: 0 | Status: DISCONTINUED | OUTPATIENT
Start: 2020-07-25 | End: 2020-07-31

## 2020-07-25 RX ORDER — POTASSIUM CHLORIDE 20 MEQ
40 PACKET (EA) ORAL ONCE
Refills: 0 | Status: COMPLETED | OUTPATIENT
Start: 2020-07-25 | End: 2020-07-25

## 2020-07-25 RX ORDER — INSULIN LISPRO 100/ML
7 VIAL (ML) SUBCUTANEOUS
Refills: 0 | Status: DISCONTINUED | OUTPATIENT
Start: 2020-07-25 | End: 2020-07-29

## 2020-07-25 RX ORDER — FUROSEMIDE 40 MG
20 TABLET ORAL ONCE
Refills: 0 | Status: COMPLETED | OUTPATIENT
Start: 2020-07-25 | End: 2020-07-25

## 2020-07-25 RX ORDER — POTASSIUM PHOSPHATE, MONOBASIC POTASSIUM PHOSPHATE, DIBASIC 236; 224 MG/ML; MG/ML
15 INJECTION, SOLUTION INTRAVENOUS ONCE
Refills: 0 | Status: COMPLETED | OUTPATIENT
Start: 2020-07-25 | End: 2020-07-25

## 2020-07-25 RX ORDER — APIXABAN 2.5 MG/1
5 TABLET, FILM COATED ORAL EVERY 12 HOURS
Refills: 0 | Status: DISCONTINUED | OUTPATIENT
Start: 2020-07-25 | End: 2020-07-31

## 2020-07-25 RX ORDER — SENNA PLUS 8.6 MG/1
2 TABLET ORAL AT BEDTIME
Refills: 0 | Status: DISCONTINUED | OUTPATIENT
Start: 2020-07-25 | End: 2020-07-31

## 2020-07-25 RX ORDER — METOPROLOL TARTRATE 50 MG
25 TABLET ORAL
Refills: 0 | Status: DISCONTINUED | OUTPATIENT
Start: 2020-07-25 | End: 2020-07-26

## 2020-07-25 RX ORDER — INSULIN GLARGINE 100 [IU]/ML
35 INJECTION, SOLUTION SUBCUTANEOUS AT BEDTIME
Refills: 0 | Status: DISCONTINUED | OUTPATIENT
Start: 2020-07-25 | End: 2020-07-27

## 2020-07-25 RX ADMIN — Medication 12.5 MILLIGRAM(S): at 12:04

## 2020-07-25 RX ADMIN — Medication 25 MILLIGRAM(S): at 21:26

## 2020-07-25 RX ADMIN — HEPARIN SODIUM 5000 UNIT(S): 5000 INJECTION INTRAVENOUS; SUBCUTANEOUS at 06:45

## 2020-07-25 RX ADMIN — Medication 12.5 MILLIGRAM(S): at 17:19

## 2020-07-25 RX ADMIN — POTASSIUM PHOSPHATE, MONOBASIC POTASSIUM PHOSPHATE, DIBASIC 63.75 MILLIMOLE(S): 236; 224 INJECTION, SOLUTION INTRAVENOUS at 05:32

## 2020-07-25 RX ADMIN — Medication 12.5 MILLIGRAM(S): at 06:19

## 2020-07-25 RX ADMIN — Medication 20 MILLIGRAM(S): at 02:10

## 2020-07-25 RX ADMIN — PANTOPRAZOLE SODIUM 40 MILLIGRAM(S): 20 TABLET, DELAYED RELEASE ORAL at 06:20

## 2020-07-25 RX ADMIN — Medication 20 MILLIGRAM(S): at 12:29

## 2020-07-25 RX ADMIN — APIXABAN 5 MILLIGRAM(S): 2.5 TABLET, FILM COATED ORAL at 17:19

## 2020-07-25 RX ADMIN — Medication 20 MILLIGRAM(S): at 19:10

## 2020-07-25 RX ADMIN — Medication 7 UNIT(S): at 17:19

## 2020-07-25 RX ADMIN — POLYETHYLENE GLYCOL 3350 17 GRAM(S): 17 POWDER, FOR SOLUTION ORAL at 12:05

## 2020-07-25 RX ADMIN — Medication 40 MILLIEQUIVALENT(S): at 12:32

## 2020-07-25 RX ADMIN — Medication 8 UNIT(S): at 07:20

## 2020-07-25 RX ADMIN — INSULIN GLARGINE 35 UNIT(S): 100 INJECTION, SOLUTION SUBCUTANEOUS at 21:25

## 2020-07-25 RX ADMIN — HEPARIN SODIUM 5000 UNIT(S): 5000 INJECTION INTRAVENOUS; SUBCUTANEOUS at 13:53

## 2020-07-25 RX ADMIN — Medication 81 MILLIGRAM(S): at 12:04

## 2020-07-25 NOTE — CHART NOTE - NSCHARTNOTEFT_GEN_A_CORE
Exam:  US Chest    Procedure Date:    History: 75y Female whose CXR today shows a possible left sided pleural effusion.  Pt is POD #3 from CABG x3     Findings:                    Evaluation of the left side of the thoracic cavity demonstrates                   small pleural effusion ~200cc                   Lung is freely movable with in thoracic cavity                    Evaluation of the right side of the thoracic cavity demonstrates                   small pleural effusion < 200cc                   Lung is freely movable with in thoracic cavity    Impression: Bilateral small pleural effusions, not amenable to drainage

## 2020-07-25 NOTE — PROGRESS NOTE ADULT - SUBJECTIVE AND OBJECTIVE BOX
CTICU  CRITICAL  CARE  attending     Hand off received 					   Pertinent clinical, laboratory, radiographic, hemodynamic, echocardiographic, respiratory data, microbiologic data and chart were reviewed and analyzed frequently throughout the course of the day and night    75 years old  Female with DM, HTN, HLD, known 3VCAD (diagnostic cath 8/2019 @ [place]), Uncontrolled Insulin Dependent DM II (Hgb A1C 11.8% on 7/17/2020),   H/O PAD s/p Left SFA/Popliteal/TP trunk and Posterior Tibial PTA and stent 8/2019  (s/p peripheral angiogram @ [place] 5/2020 with "ballooning and stenting"  (on Eliquis),   H/O Osteomyelitis s/p AKA LLE 11/2019 who presented to cardiologist Dr. Yepez for follow-up. Patient reports SOB after climbing 1-2 flights of stairs resolved with rest ( CCS Angina Class III Equivalent Symptoms). She denies chest pain, palpitations, dizziness, syncope, orthopnea, PND. Does however report RLE dependent edema.  In light of pt's risk factors, known 3VCAD, CCS Angina Class III Equivalent Symptoms, she was scheduled fot cath.  Cardiac cath 7/17/20 showed severe 3VCAD: dLM 40-50% (largely Ca++), pLAD 70% (largely Ca++), mid/distalLAD minor plaque, ostialD1 70% (medium sized vessel), ostialLCx 80% (largely Ca++), OM1/OM2 minor plaque (med size), pRCA 60% (large dominant), mRCA 70%, dRCA minor plaque, PDA/PLS minor plaque, EF 65%.   Patient consulted by Dr. Tavarez for CABG. She was electively admitted for CABG.  S/P CABG.  Postoperative course complicated by atrioventricular block.  S/P PPM.    FAMILY HISTORY:  PAST MEDICAL & SURGICAL HISTORY:  History of osteomyelitis  CAD (coronary artery disease)  PAD (peripheral artery disease)  Diabetes mellitus  HLD (hyperlipidemia)  HTN (hypertension)       14 system review was unremarkable      Vital signs, hemodynamic and respiratory parameters were reviewed from the bedside nursing flow sheet.  ICU Vital Signs Last 24 Hrs  T(C): 36.5 (25 Jul 2020 21:06), Max: 37.1 (25 Jul 2020 14:00)  T(F): 97.7 (25 Jul 2020 21:06), Max: 98.7 (25 Jul 2020 14:00)  HR: 70 (25 Jul 2020 22:00) (69 - 72)  BP: 128/66 (25 Jul 2020 22:00) (123/58 - 155/71)  BP(mean): 92 (25 Jul 2020 22:00) (84 - 102)  ABP: --  ABP(mean): --  RR: 22 (25 Jul 2020 22:00) (16 - 37)  SpO2: 100% (25 Jul 2020 22:00) (97% - 100%)    Adult Advanced Hemodynamics Last 24 Hrs  CVP(mm Hg): 20 (25 Jul 2020 22:00) (6 - 31)  CVP(cm H2O): --  CO: --  CI: --  PA: --  PA(mean): --  PCWP: --  SVR: --  SVRI: --  PVR: --  PVRI: --,     Intake and output was reviewed and the fluid balance was calculated  Daily     Daily   I&O's Summary    24 Jul 2020 07:01  -  25 Jul 2020 07:00  --------------------------------------------------------  IN: 1297.5 mL / OUT: 780 mL / NET: 517.5 mL    25 Jul 2020 07:01  -  25 Jul 2020 22:51  --------------------------------------------------------  IN: 1187.5 mL / OUT: 1005 mL / NET: 182.5 mL        All lines and drain sites were assessed      Neuro: No focal motor deficit after CABG.  Neck: No JVD.  CVS: S1, S2, No S3.  Lungs: Good air entry bilaterally.  Abd: Soft. No tenderness. + Bowel sounds.  Vascular: + DP/PT on the right side.  Extremities: No edema. Left AKA.  Lymphatic: Normal.  Skin: No abnormalities.      labs  CBC Full  -  ( 25 Jul 2020 11:33 )  WBC Count : 13.92 K/uL  RBC Count : 3.16 M/uL  Hemoglobin : 9.0 g/dL  Hematocrit : 27.2 %  Platelet Count - Automated : 95 K/uL  Mean Cell Volume : 86.1 fl  Mean Cell Hemoglobin : 28.5 pg  Mean Cell Hemoglobin Concentration : 33.1 gm/dL  Auto Neutrophil # : x  Auto Lymphocyte # : x  Auto Monocyte # : x  Auto Eosinophil # : x  Auto Basophil # : x  Auto Neutrophil % : x  Auto Lymphocyte % : x  Auto Monocyte % : x  Auto Eosinophil % : x  Auto Basophil % : x    07-25    140  |  105  |  18  ----------------------------<  93  3.9   |  24  |  0.61    Ca    8.3<L>      25 Jul 2020 11:33  Phos  1.9     07-25  Mg     2.1     07-25    TPro  5.6<L>  /  Alb  3.5  /  TBili  1.1  /  DBili  0.2  /  AST  154<H>  /  ALT  113<H>  /  AlkPhos  576<H>  07-25    PT/INR - ( 25 Jul 2020 11:33 )   PT: 16.5 sec;   INR: 1.40          PTT - ( 25 Jul 2020 11:33 )  PTT:27.4 sec  The current medications were reviewed   MEDICATIONS  (STANDING):  apixaban 5 milliGRAM(s) Oral every 12 hours  aspirin enteric coated 81 milliGRAM(s) Oral daily  chlorhexidine 2% Cloths 1 Application(s) Topical daily  dextrose 50% Injectable 25 Gram(s) IV Push once  dextrose 50% Injectable 50 milliLiter(s) IV Push every 15 minutes  dextrose 50% Injectable 25 milliLiter(s) IV Push every 15 minutes  insulin glargine Injectable (LANTUS) 35 Unit(s) SubCutaneous at bedtime  insulin lispro (HumaLOG) corrective regimen sliding scale   SubCutaneous Before meals and at bedtime  insulin lispro Injectable (HumaLOG) 7 Unit(s) SubCutaneous three times a day before meals  levoFLOXacin  Tablet 500 milliGRAM(s) Oral every 24 hours  metoprolol tartrate 25 milliGRAM(s) Oral two times a day  pantoprazole    Tablet 40 milliGRAM(s) Oral before breakfast  polyethylene glycol 3350 17 Gram(s) Oral daily  senna 2 Tablet(s) Oral at bedtime  sodium chloride 0.9%. 1000 milliLiter(s) (10 mL/Hr) IV Continuous <Continuous>    MEDICATIONS  (PRN):  oxyCODONE    IR 5 milliGRAM(s) Oral every 6 hours PRN Moderate Pain (4 - 6)        PROBLEM LIST/ ASSESSMENT:  HEALTH ISSUES - PROBLEM Dx:  AV block: AV block        Patient is a 75y old  Female who presented with CAD.  S/P CABG.  S/P PPM.  Hemodynamically stable.  Good oxygenation.  Fair urine out put.  Overall doing well.      My plan includes :  Statin and Betablocker.  ASA and Eliquis.  Close hemodynamic, ventilatory and drain monitoring and management  Monitor for arrhythmias and monitor parameters for organ perfusion  Monitor neurologic status  Monitor renal function.  Head of the bed should remain elevated to 45 deg .   Chest PT and IS will be encouraged  Monitor adequacy of oxygenation and ventilation and attempt to wean oxygen  Nutritional goals will be met using po eventually , ensure adequate caloric intake and monitor the same  Stress ulcer and VTE prophylaxis will be achieved    Glycemic control is satisfactory  Electrolytes have been repleted as necessary and wound care has been carried out. Pain control has been achieved.   Aggressive physical therapy and early mobility and ambulation goals will be met   The family was updated about the course and plan  CRITICAL CARE TIME SPENT in evaluation and management, reassessments, review and interpretation of labs and x-rays, ventilator and hemodynamic management, formulating a plan and coordinating care: ___30____ MIN.  Time does not include procedural time.  CTICU ATTENDING     					    Cade Gandhi MD

## 2020-07-25 NOTE — PROGRESS NOTE ADULT - SUBJECTIVE AND OBJECTIVE BOX
INTERVAL HPI/OVERNIGHT EVENTS:    7/22: CABG x 3  EF normal   Cardiology Dr Yepez    76 yo Female Hx uncontrolled DM (HgA1c 11.8), 3v CAD, HTN, HLD, PAD s/p LSFA/Popliteal/TP trunk and Posterior Tibial PTA and stent 8/2019, Osteomyelitis s/p AKA LLE '19 with sxs SOB/RIBEIRO.    Cath 7/17: severe 3VCAD: dLM 40-50% (largely Ca++), pLAD 70% (largely Ca++), mid/distalLAD minor plaque, ostialD1 70% (medium sized vessel), ostialLCx 80% (largely Ca++), OM1/OM2 minor plaque (med size), pRCA 60% (large dominant), mRCA 70%, dRCA minor plaque, PDA/PLS minor plaque, EF 65%.     to OR today:   CABG x 3   intraop: 2 U pRBC  insulin requirements and infusion for hyperglycemia  paced - underlying rhythm - asystole   arrived to ICU on Levo 0.04 and Vaso 2.     7/23 Extubated   7/24 leadless Micra pacer placed and now pacing at 70 bpm     no issues reported overnight   PPM interrogated today   Inc LFT noted - statin held and RUQ sono ordered     currently OOB in chair    ICU Vital Signs Last 24 Hrs  T(C): 37.1 (25 Jul 2020 14:00), Max: 37.1 (25 Jul 2020 14:00)  T(F): 98.7 (25 Jul 2020 14:00), Max: 98.7 (25 Jul 2020 14:00)  HR: 69 (25 Jul 2020 15:00) (69 - 72) paced   BP: 144/67 (25 Jul 2020 15:00) (123/58 - 158/70)  BP(mean): 96 (25 Jul 2020 15:00) (84 - 102)  ABP: 150/66 (24 Jul 2020 17:00) (150/66 - 183/71)  ABP(mean): 91 (24 Jul 2020 17:00) (91 - 99)  RR: 33 (25 Jul 2020 15:00) (12 - 37)  SpO2: 98% (25 Jul 2020 15:00) (97% - 100%) 2L NC    Qtts: None     I&O's Summary    24 Jul 2020 07:01  -  25 Jul 2020 07:00  --------------------------------------------------------  IN: 1297.5 mL / OUT: 780 mL / NET: 517.5 mL    25 Jul 2020 07:01  -  25 Jul 2020 15:17  --------------------------------------------------------  IN: 767.5 mL / OUT: 655 mL / NET: 112.5 mL    Physical Exam    Heart regular (-)rub/gallop  Lungs - CTA anterior (-)rub/gallop  Abd (+)BS soft NTND (-)r/r/g  Ext - warm to touch RLE - left AKA - stump clean and dry  Chest - incision site clean and dry  Neuro - alert/oriented and interactive; nonfocal   Skin - no rash       LABS:                        9.0    13.92 )-----------( 95       ( 25 Jul 2020 11:33 )             27.2     07-25    140  |  105  |  18  ----------------------------<  93  3.9   |  24  |  0.61    Ca    8.3<L>      25 Jul 2020 11:33  Phos  1.9     07-25  Mg     2.1     07-25    TPro  5.6<L>  /  Alb  3.5  /  TBili  1.1  /  DBili  0.2  /  AST  154<H>  /  ALT  113<H>  /  AlkPhos  576<H>  07-25    PT/INR - ( 25 Jul 2020 11:33 )   PT: 16.5 sec;   INR: 1.40     PTT - ( 25 Jul 2020 11:33 )  PTT:27.4 sec    RADIOLOGY & ADDITIONAL STUDIES: reviewed    patient with poorly controlled DM and CAD with anginal-equivalent sxs now s/p CABG x 3 and PPM - doing well    1. CV  hemodynamically stable - paced  ASA/statin  eliquis ongoing for atrial Fib   Metoprolol 12.5 q6h Hours - titrate as clinical scenario     2. Pulm   titrate supplemental oxygen down - maintain Sa02 93% or greater  incentive spirometry/ambulation with staff assit    3. Endocrine  maintain glycemic control  POC 88/136  lantus/humalog/ISS    4. GI  inc LFT  stop statin and to obtain RUQ sono   cont to trend LFT    diabetic teaching and education when appropriate    DVT and GI prophylaxis  anticipate transfer to floor when bed available    d/w patient/staff and CTS    I have spent/provided stated minutes of critical care time to this patient: 60

## 2020-07-25 NOTE — PROGRESS NOTE ADULT - SUBJECTIVE AND OBJECTIVE BOX
s/p MICRA AV insertion via RFV.    Rhythm is all VPaced,  Underlying rhythm is SR with high degree AV block in the 50's.    Device interrogated:  R waves: ~6 mv  Zv: 600 ohms  Threshold: 0.25 V @ 0.24 ms    Set to VVIR 70 - 120 ppm    Atrial sensing was suboptimal for VDD programming.  Will reassess as outpatient.

## 2020-07-25 NOTE — PROGRESS NOTE ADULT - SUBJECTIVE AND OBJECTIVE BOX
INTERVAL HPI/OVERNIGHT EVENTS:    Patient is a 75y old  Female who presents with a chief complaint of CAD (2020 18:15)  pt was seen and examined at the bedside Pt CXR showed right Pleural effusion Pt didn't have appetite, pt had tea for breakfast and soup fro lunch.     FSG & Insulin received:    Yesterday:  pre-dinner fs  nutritional lispro  7 units + 4  units lispro SS  bedtime fs  lantus 38   units + 8   units lispro SS    Today:  pre-breakfast fs  nutritional lispro 8  units  pre-lunch fs        Pt reports the following symptoms:    ROS:  Denies the following except as indicated.    General: weight loss/weight gain, decreased appetite, fatigue  CV: palpitations, SOB, CP, cough  GI: NVD, difficulty swallowing, abdominal pain  : polyuria, dysuria  MSK: weakness, joint pain  Heme: Easy bruising,bleeding  Neuro: HA, dizziness  Psych: Anxiety, Depression    MEDICATIONS  (STANDING):  aspirin enteric coated 81 milliGRAM(s) Oral daily  chlorhexidine 2% Cloths 1 Application(s) Topical daily  dextrose 50% Injectable 25 Gram(s) IV Push once  dextrose 50% Injectable 50 milliLiter(s) IV Push every 15 minutes  dextrose 50% Injectable 25 milliLiter(s) IV Push every 15 minutes  heparin   Injectable 5000 Unit(s) SubCutaneous every 8 hours  insulin glargine Injectable (LANTUS) 38 Unit(s) SubCutaneous at bedtime  insulin lispro (HumaLOG) corrective regimen sliding scale   SubCutaneous Before meals and at bedtime  insulin lispro Injectable (HumaLOG) 8 Unit(s) SubCutaneous three times a day before meals  metoprolol tartrate 12.5 milliGRAM(s) Oral every 6 hours  pantoprazole    Tablet 40 milliGRAM(s) Oral before breakfast  polyethylene glycol 3350 17 Gram(s) Oral daily  senna 2 Tablet(s) Oral at bedtime  sodium chloride 0.9%. 1000 milliLiter(s) (10 mL/Hr) IV Continuous <Continuous>    MEDICATIONS  (PRN):  oxyCODONE    IR 5 milliGRAM(s) Oral every 6 hours PRN Moderate Pain (4 - 6)      Past medical history reviewed  Family history reviewed  Social history reviewed    PHYSICAL EXAM  Vital Signs Last 24 Hrs  T(C): 37.1 (2020 14:00), Max: 37.1 (2020 14:00)  T(F): 98.7 (2020 14:00), Max: 98.7 (2020 14:00)  HR: 69 (2020 14:00) (69 - 72)  BP: 155/71 (2020 14:00) (123/58 - 158/70)  BP(mean): 102 (2020 14:00) (84 - 102)  RR: 26 (2020 14:00) (12 - 37)  SpO2: 97% (2020 14:00) (97% - 100%)      Constitutional:  in NAD.   Neck: no thyromegaly or palpable thyroid nodules   Respiratory: lungs CTAB.  Cardiovascular: normal S1 and S2, s/p CACBG   GI: soft, NT/ND, no masses/HSM appreciated.  Skin: no visible rashes/lesions  Psychiatric: AAO x 3, normal affect/mood.  Ext: no edema.     LABS:                        9.0    13.92 )-----------( 95       ( 2020 11:33 )             27.2     0725    140  |  105  |  18  ----------------------------<  93  3.9   |  24  |  0.61    Ca    8.3<L>      2020 11:33  Phos  1.9       Mg     2.1         TPro  5.6<L>  /  Alb  3.5  /  TBili  1.1  /  DBili  0.2  /  AST  154<H>  /  ALT  113<H>  /  AlkPhos  576<H>  25    PT/INR - ( 2020 11:33 )   PT: 16.5 sec;   INR: 1.40          PTT - ( 2020 11:33 )  PTT:27.4 sec    Thyroid Stimulating Hormone, Serum: 2.238 uIU/mL ( @ 09:58)      HbA1C: 11.8 % ( @ 09:58)      CAPILLARY BLOOD GLUCOSE      POCT Blood Glucose.: 88 mg/dL (2020 11:22)  POCT Blood Glucose.: 136 mg/dL (2020 06:26)  POCT Blood Glucose.: 301 mg/dL (2020 21:48)  POCT Blood Glucose.: 218 mg/dL (2020 15:40)      Cholesterol, Serum: 172 mg/dL (20 @ 09:58)  HDL Cholesterol, Serum: 50 mg/dL (20 @ 09:58)  Triglycerides, Serum: 89 mg/dL (20 @ 09:58)  Direct LDL: 104 mg/dL (20 @ 09:58)    A/P:75y Female with PMHx known 3VCAD (diagnostic cath 2019 @ [place]), HTN, HLD, Uncontrolled Insulin Dependent DM II (Hgb A1C 11.8% on 2020), PAD s/p LSFA/Popliteal/TP trunk and Posterior Tibial PTA and stent 2019  (s/p peripheral angiogram @ [place] 2020 with "ballooning and stenting" per patient- on Eliquis, Osteomyelitis s/p AKA LLE 2019 who presented to cardiologist Dr. Yepez for follow-up.  s/p CABG on 2020. s/p insulin drip. undergoing endocrine consult for DM management.    1.  DM  Hgb A1C 11.8  Please decrease lantus to 35 units at night.   Please decrease lispro  to 7   units before each meal, please hold if pt NPO  Please continue lispro moderate sliding scale four times daily with meals and at bedtime    Pt's fingerstick glucose goal is 80- 180    Will continue to monitor     For discharge, pt can continue    Pt can follow up at discharge with [facility] by calling  to make an appointment.    discussed case with Dr. jenkins   and update primary team INTERVAL HPI/OVERNIGHT EVENTS:    Patient is a 75y old  Female who presents with a chief complaint of CAD (2020 18:15)  pt was seen and examined at the bedside. S/P CABG.  Pt didn't have appetite, pt had tea for breakfast and soup fro lunch.     FSG & Insulin received:    Yesterday:  pre-dinner fs  nutritional lispro  7 units + 4  units lispro SS  bedtime fs  lantus 38   units + 8   units lispro SS    Today:  pre-breakfast fs  nutritional lispro 8  units  pre-lunch fs        Pt reports the following symptoms:    ROS:  Denies the following except as indicated.    General: weight loss/weight gain, decreased appetite, fatigue  CV: palpitations, SOB, CP, cough  GI: NVD, difficulty swallowing, abdominal pain  : polyuria, dysuria  MSK: weakness, joint pain  Heme: Easy bruising,bleeding  Neuro: HA, dizziness  Psych: Anxiety, Depression    MEDICATIONS  (STANDING):  aspirin enteric coated 81 milliGRAM(s) Oral daily  chlorhexidine 2% Cloths 1 Application(s) Topical daily  dextrose 50% Injectable 25 Gram(s) IV Push once  dextrose 50% Injectable 50 milliLiter(s) IV Push every 15 minutes  dextrose 50% Injectable 25 milliLiter(s) IV Push every 15 minutes  heparin   Injectable 5000 Unit(s) SubCutaneous every 8 hours  insulin glargine Injectable (LANTUS) 38 Unit(s) SubCutaneous at bedtime  insulin lispro (HumaLOG) corrective regimen sliding scale   SubCutaneous Before meals and at bedtime  insulin lispro Injectable (HumaLOG) 8 Unit(s) SubCutaneous three times a day before meals  metoprolol tartrate 12.5 milliGRAM(s) Oral every 6 hours  pantoprazole    Tablet 40 milliGRAM(s) Oral before breakfast  polyethylene glycol 3350 17 Gram(s) Oral daily  senna 2 Tablet(s) Oral at bedtime  sodium chloride 0.9%. 1000 milliLiter(s) (10 mL/Hr) IV Continuous <Continuous>    MEDICATIONS  (PRN):  oxyCODONE    IR 5 milliGRAM(s) Oral every 6 hours PRN Moderate Pain (4 - 6)      Past medical history reviewed  Family history reviewed  Social history reviewed    PHYSICAL EXAM  Vital Signs Last 24 Hrs  T(C): 37.1 (2020 14:00), Max: 37.1 (2020 14:00)  T(F): 98.7 (2020 14:00), Max: 98.7 (2020 14:00)  HR: 69 (2020 14:00) (69 - 72)  BP: 155/71 (2020 14:00) (123/58 - 158/70)  BP(mean): 102 (2020 14:00) (84 - 102)  RR: 26 (2020 14:00) (12 - 37)  SpO2: 97% (2020 14:00) (97% - 100%)      Constitutional:  in NAD.   Neck: no thyromegaly or palpable thyroid nodules   Respiratory: lungs CTAB.  Cardiovascular: normal S1 and S2, s/p CACBG   GI: soft, NT/ND, no masses/HSM appreciated.  Skin: no visible rashes/lesions  Psychiatric: AAO x 3, normal affect/mood.  Ext: no edema.     LABS:                        9.0    13.92 )-----------( 95       ( 2020 11:33 )             27.2     07-25    140  |  105  |  18  ----------------------------<  93  3.9   |  24  |  0.61    Ca    8.3<L>      2020 11:33  Phos  1.9       Mg     2.1         TPro  5.6<L>  /  Alb  3.5  /  TBili  1.1  /  DBili  0.2  /  AST  154<H>  /  ALT  113<H>  /  AlkPhos  576<H>  25    PT/INR - ( 2020 11:33 )   PT: 16.5 sec;   INR: 1.40          PTT - ( 2020 11:33 )  PTT:27.4 sec    Thyroid Stimulating Hormone, Serum: 2.238 uIU/mL ( @ 09:58)      HbA1C: 11.8 % ( @ 09:58)      CAPILLARY BLOOD GLUCOSE      POCT Blood Glucose.: 88 mg/dL (2020 11:22)  POCT Blood Glucose.: 136 mg/dL (2020 06:26)  POCT Blood Glucose.: 301 mg/dL (2020 21:48)  POCT Blood Glucose.: 218 mg/dL (2020 15:40)      Cholesterol, Serum: 172 mg/dL (20 @ 09:58)  HDL Cholesterol, Serum: 50 mg/dL (20 @ 09:58)  Triglycerides, Serum: 89 mg/dL (20 @ 09:58)  Direct LDL: 104 mg/dL (20 @ 09:58)    A/P:75y Female with PMHx known 3VCAD (diagnostic cath 2019 @ [place]), HTN, HLD, Uncontrolled Insulin Dependent DM II (Hgb A1C 11.8% on 2020), PAD s/p LSFA/Popliteal/TP trunk and Posterior Tibial PTA and stent 2019  (s/p peripheral angiogram @ [place] 2020 with "ballooning and stenting" per patient- on Eliquis, Osteomyelitis s/p AKA LLE 2019 who presented to cardiologist Dr. Yepez for follow-up.  s/p CABG on 2020. s/p insulin drip. undergoing endocrine consult for DM management.    1.  DM  Hgb A1C 11.8  Please decrease lantus to 35 units at night.   Please decrease lispro  to 7   units before each meal, please hold if pt NPO  Please continue lispro moderate sliding scale four times daily with meals and at bedtime    Pt's fingerstick glucose goal is 80- 180    Will continue to monitor     For discharge, pt can continue    Pt can follow up at discharge with [facility] by calling  to make an appointment.    discussed case with Dr. jenkins   and update primary team

## 2020-07-26 PROBLEM — I10 ESSENTIAL (PRIMARY) HYPERTENSION: Chronic | Status: ACTIVE | Noted: 2020-07-15

## 2020-07-26 PROBLEM — I73.9 PERIPHERAL VASCULAR DISEASE, UNSPECIFIED: Chronic | Status: ACTIVE | Noted: 2020-07-15

## 2020-07-26 PROBLEM — E78.5 HYPERLIPIDEMIA, UNSPECIFIED: Chronic | Status: ACTIVE | Noted: 2020-07-15

## 2020-07-26 PROBLEM — E11.9 TYPE 2 DIABETES MELLITUS WITHOUT COMPLICATIONS: Chronic | Status: ACTIVE | Noted: 2020-07-15

## 2020-07-26 PROBLEM — I25.10 ATHEROSCLEROTIC HEART DISEASE OF NATIVE CORONARY ARTERY WITHOUT ANGINA PECTORIS: Chronic | Status: ACTIVE | Noted: 2020-07-15

## 2020-07-26 PROBLEM — Z87.39 PERSONAL HISTORY OF OTHER DISEASES OF THE MUSCULOSKELETAL SYSTEM AND CONNECTIVE TISSUE: Chronic | Status: ACTIVE | Noted: 2020-07-15

## 2020-07-26 LAB
ALBUMIN SERPL ELPH-MCNC: 3.2 G/DL — LOW (ref 3.3–5)
ALP SERPL-CCNC: 540 U/L — HIGH (ref 40–120)
ALT FLD-CCNC: 147 U/L — HIGH (ref 10–45)
ANION GAP SERPL CALC-SCNC: 8 MMOL/L — SIGNIFICANT CHANGE UP (ref 5–17)
APTT BLD: 30.4 SEC — SIGNIFICANT CHANGE UP (ref 27.5–35.5)
AST SERPL-CCNC: 169 U/L — HIGH (ref 10–40)
BILIRUB SERPL-MCNC: 0.8 MG/DL — SIGNIFICANT CHANGE UP (ref 0.2–1.2)
BUN SERPL-MCNC: 18 MG/DL — SIGNIFICANT CHANGE UP (ref 7–23)
CALCIUM SERPL-MCNC: 8.1 MG/DL — LOW (ref 8.4–10.5)
CHLORIDE SERPL-SCNC: 106 MMOL/L — SIGNIFICANT CHANGE UP (ref 96–108)
CO2 SERPL-SCNC: 25 MMOL/L — SIGNIFICANT CHANGE UP (ref 22–31)
CREAT SERPL-MCNC: 0.54 MG/DL — SIGNIFICANT CHANGE UP (ref 0.5–1.3)
GLUCOSE BLDC GLUCOMTR-MCNC: 154 MG/DL — HIGH (ref 70–99)
GLUCOSE BLDC GLUCOMTR-MCNC: 162 MG/DL — HIGH (ref 70–99)
GLUCOSE BLDC GLUCOMTR-MCNC: 194 MG/DL — HIGH (ref 70–99)
GLUCOSE BLDC GLUCOMTR-MCNC: 221 MG/DL — HIGH (ref 70–99)
GLUCOSE SERPL-MCNC: 139 MG/DL — HIGH (ref 70–99)
HCT VFR BLD CALC: 26.9 % — LOW (ref 34.5–45)
HGB BLD-MCNC: 8.7 G/DL — LOW (ref 11.5–15.5)
INR BLD: 2.11 — HIGH (ref 0.88–1.16)
LACTATE SERPL-SCNC: 1.1 MMOL/L — SIGNIFICANT CHANGE UP (ref 0.5–2)
MAGNESIUM SERPL-MCNC: 2 MG/DL — SIGNIFICANT CHANGE UP (ref 1.6–2.6)
MCHC RBC-ENTMCNC: 28.1 PG — SIGNIFICANT CHANGE UP (ref 27–34)
MCHC RBC-ENTMCNC: 32.3 GM/DL — SIGNIFICANT CHANGE UP (ref 32–36)
MCV RBC AUTO: 86.8 FL — SIGNIFICANT CHANGE UP (ref 80–100)
NRBC # BLD: 0 /100 WBCS — SIGNIFICANT CHANGE UP (ref 0–0)
PHOSPHATE SERPL-MCNC: 2.1 MG/DL — LOW (ref 2.5–4.5)
PLATELET # BLD AUTO: 114 K/UL — LOW (ref 150–400)
POTASSIUM SERPL-MCNC: 4.4 MMOL/L — SIGNIFICANT CHANGE UP (ref 3.5–5.3)
POTASSIUM SERPL-SCNC: 4.4 MMOL/L — SIGNIFICANT CHANGE UP (ref 3.5–5.3)
PROT SERPL-MCNC: 5.3 G/DL — LOW (ref 6–8.3)
PROTHROM AB SERPL-ACNC: 24.4 SEC — HIGH (ref 10.6–13.6)
RBC # BLD: 3.1 M/UL — LOW (ref 3.8–5.2)
RBC # FLD: 15.4 % — HIGH (ref 10.3–14.5)
SODIUM SERPL-SCNC: 139 MMOL/L — SIGNIFICANT CHANGE UP (ref 135–145)
WBC # BLD: 11.67 K/UL — HIGH (ref 3.8–10.5)
WBC # FLD AUTO: 11.67 K/UL — HIGH (ref 3.8–10.5)

## 2020-07-26 PROCEDURE — 71045 X-RAY EXAM CHEST 1 VIEW: CPT | Mod: 26

## 2020-07-26 PROCEDURE — 99231 SBSQ HOSP IP/OBS SF/LOW 25: CPT

## 2020-07-26 PROCEDURE — 99291 CRITICAL CARE FIRST HOUR: CPT

## 2020-07-26 PROCEDURE — 99232 SBSQ HOSP IP/OBS MODERATE 35: CPT

## 2020-07-26 RX ORDER — METOPROLOL TARTRATE 50 MG
25 TABLET ORAL EVERY 6 HOURS
Refills: 0 | Status: DISCONTINUED | OUTPATIENT
Start: 2020-07-26 | End: 2020-07-27

## 2020-07-26 RX ORDER — FUROSEMIDE 40 MG
20 TABLET ORAL EVERY 12 HOURS
Refills: 0 | Status: DISCONTINUED | OUTPATIENT
Start: 2020-07-26 | End: 2020-07-27

## 2020-07-26 RX ORDER — FUROSEMIDE 40 MG
20 TABLET ORAL ONCE
Refills: 0 | Status: COMPLETED | OUTPATIENT
Start: 2020-07-26 | End: 2020-07-26

## 2020-07-26 RX ADMIN — INSULIN GLARGINE 35 UNIT(S): 100 INJECTION, SOLUTION SUBCUTANEOUS at 21:13

## 2020-07-26 RX ADMIN — APIXABAN 5 MILLIGRAM(S): 2.5 TABLET, FILM COATED ORAL at 05:37

## 2020-07-26 RX ADMIN — Medication 4: at 17:00

## 2020-07-26 RX ADMIN — Medication 25 MILLIGRAM(S): at 05:38

## 2020-07-26 RX ADMIN — Medication 25 MILLIGRAM(S): at 17:16

## 2020-07-26 RX ADMIN — Medication 7 UNIT(S): at 11:45

## 2020-07-26 RX ADMIN — Medication 7 UNIT(S): at 08:03

## 2020-07-26 RX ADMIN — CHLORHEXIDINE GLUCONATE 1 APPLICATION(S): 213 SOLUTION TOPICAL at 00:30

## 2020-07-26 RX ADMIN — Medication 20 MILLIGRAM(S): at 21:13

## 2020-07-26 RX ADMIN — Medication 25 MILLIGRAM(S): at 23:02

## 2020-07-26 RX ADMIN — Medication 2: at 08:03

## 2020-07-26 RX ADMIN — Medication 7 UNIT(S): at 17:19

## 2020-07-26 RX ADMIN — Medication 62.5 MILLIMOLE(S): at 06:14

## 2020-07-26 RX ADMIN — APIXABAN 5 MILLIGRAM(S): 2.5 TABLET, FILM COATED ORAL at 17:16

## 2020-07-26 RX ADMIN — Medication 2: at 11:45

## 2020-07-26 RX ADMIN — Medication 20 MILLIGRAM(S): at 16:00

## 2020-07-26 RX ADMIN — POLYETHYLENE GLYCOL 3350 17 GRAM(S): 17 POWDER, FOR SOLUTION ORAL at 16:42

## 2020-07-26 RX ADMIN — Medication 81 MILLIGRAM(S): at 11:45

## 2020-07-26 RX ADMIN — PANTOPRAZOLE SODIUM 40 MILLIGRAM(S): 20 TABLET, DELAYED RELEASE ORAL at 06:13

## 2020-07-26 NOTE — PROGRESS NOTE ADULT - SUBJECTIVE AND OBJECTIVE BOX
CTICU  CRITICAL  CARE  attending     Hand off received 					   Pertinent clinical, laboratory, radiographic, hemodynamic, echocardiographic, respiratory data, microbiologic data and chart were reviewed and analyzed frequently throughout the course of the day and night    75 years old  Female with DM, HTN, HLD, known 3VCAD (diagnostic cath 8/2019 @ [place]), Uncontrolled Insulin Dependent DM II (Hgb A1C 11.8% on 7/17/2020),   H/O PAD s/p Left SFA/Popliteal/TP trunk and Posterior Tibial PTA and stent 8/2019  (s/p peripheral angiogram @ [place] 5/2020 with "ballooning and stenting"  (on Eliquis),   H/O Osteomyelitis s/p AKA LLE 11/2019 who presented to cardiologist Dr. Yepez for follow-up. Patient reports SOB after climbing 1-2 flights of stairs resolved with rest ( CCS Angina Class III Equivalent Symptoms). She denies chest pain, palpitations, dizziness, syncope, orthopnea, PND. Does however report RLE dependent edema.  In light of pt's risk factors, known 3VCAD, CCS Angina Class III Equivalent Symptoms, she was scheduled fot cath.  Cardiac cath 7/17/20 showed severe 3VCAD: dLM 40-50% (largely Ca++), pLAD 70% (largely Ca++), mid/distalLAD minor plaque, ostialD1 70% (medium sized vessel), ostialLCx 80% (largely Ca++), OM1/OM2 minor plaque (med size), pRCA 60% (large dominant), mRCA 70%, dRCA minor plaque, PDA/PLS minor plaque, EF 65%.   Patient consulted by Dr. Tavarez for CABG. She was electively admitted for CABG.  S/P CABG.  Postoperative course complicated by atrioventricular block.  S/P PPM.        FAMILY HISTORY:  PAST MEDICAL & SURGICAL HISTORY:  History of osteomyelitis  CAD (coronary artery disease)  PAD (peripheral artery disease)  Diabetes mellitus  HLD (hyperlipidemia)  HTN (hypertension)        14 system review was unremarkable      Vital signs, hemodynamic and respiratory parameters were reviewed from the bedside nursing flow sheet.  ICU Vital Signs Last 24 Hrs  T(C): 36.4 (26 Jul 2020 21:19), Max: 36.7 (26 Jul 2020 14:00)  T(F): 97.5 (26 Jul 2020 21:19), Max: 98 (26 Jul 2020 14:00)  HR: 69 (26 Jul 2020 21:00) (69 - 74)  BP: 139/70 (26 Jul 2020 21:00) (126/62 - 173/111)  BP(mean): 99 (26 Jul 2020 21:00) (89 - 102)  ABP: --  ABP(mean): --  RR: 29 (26 Jul 2020 21:00) (18 - 29)  SpO2: 97% (26 Jul 2020 21:00) (94% - 100%)    Adult Advanced Hemodynamics Last 24 Hrs  CVP(mm Hg): 17 (26 Jul 2020 10:00) (8 - 21)  CVP(cm H2O): --  CO: --  CI: --  PA: --  PA(mean): --  PCWP: --  SVR: --  SVRI: --  PVR: --  PVRI: --,     Intake and output was reviewed and the fluid balance was calculated  Daily     Daily   I&O's Summary    25 Jul 2020 07:01  -  26 Jul 2020 07:00  --------------------------------------------------------  IN: 1462.5 mL / OUT: 1155 mL / NET: 307.5 mL    26 Jul 2020 07:01  -  26 Jul 2020 21:50  --------------------------------------------------------  IN: 1205 mL / OUT: 550 mL / NET: 655 mL        All lines and drain sites were assessed      Neuro: No change in the neurostatus from preop state.  Neck: No JVD.  CVS: S1, S2, No S3.  Lungs: Good air entry bilaterally.  Abd: Soft. No tenderness. + Bowel sounds.  Vascular: + DP/PT.  Extremities: Left sided AKA.  Lymphatic: Normal.  Skin: No abnormalities.      labs  CBC Full  -  ( 26 Jul 2020 02:56 )  WBC Count : 11.67 K/uL  RBC Count : 3.10 M/uL  Hemoglobin : 8.7 g/dL  Hematocrit : 26.9 %  Platelet Count - Automated : 114 K/uL  Mean Cell Volume : 86.8 fl  Mean Cell Hemoglobin : 28.1 pg  Mean Cell Hemoglobin Concentration : 32.3 gm/dL  Auto Neutrophil # : x  Auto Lymphocyte # : x  Auto Monocyte # : x  Auto Eosinophil # : x  Auto Basophil # : x  Auto Neutrophil % : x  Auto Lymphocyte % : x  Auto Monocyte % : x  Auto Eosinophil % : x  Auto Basophil % : x    07-26    139  |  106  |  18  ----------------------------<  139<H>  4.4   |  25  |  0.54    Ca    8.1<L>      26 Jul 2020 02:56  Phos  2.1     07-26  Mg     2.0     07-26    TPro  5.3<L>  /  Alb  3.2<L>  /  TBili  0.8  /  DBili  x   /  AST  169<H>  /  ALT  147<H>  /  AlkPhos  540<H>  07-26    PT/INR - ( 26 Jul 2020 02:56 )   PT: 24.4 sec;   INR: 2.11          PTT - ( 26 Jul 2020 02:56 )  PTT:30.4 sec  The current medications were reviewed   MEDICATIONS  (STANDING):  apixaban 5 milliGRAM(s) Oral every 12 hours  aspirin enteric coated 81 milliGRAM(s) Oral daily  chlorhexidine 2% Cloths 1 Application(s) Topical daily  dextrose 50% Injectable 25 Gram(s) IV Push once  dextrose 50% Injectable 50 milliLiter(s) IV Push every 15 minutes  dextrose 50% Injectable 25 milliLiter(s) IV Push every 15 minutes  furosemide   Injectable 20 milliGRAM(s) IV Push every 12 hours  insulin glargine Injectable (LANTUS) 35 Unit(s) SubCutaneous at bedtime  insulin lispro (HumaLOG) corrective regimen sliding scale   SubCutaneous Before meals and at bedtime  insulin lispro Injectable (HumaLOG) 7 Unit(s) SubCutaneous three times a day before meals  levoFLOXacin  Tablet 500 milliGRAM(s) Oral every 24 hours  metoprolol tartrate 25 milliGRAM(s) Oral every 6 hours  pantoprazole    Tablet 40 milliGRAM(s) Oral before breakfast  polyethylene glycol 3350 17 Gram(s) Oral daily  senna 2 Tablet(s) Oral at bedtime  sodium chloride 0.9%. 1000 milliLiter(s) (10 mL/Hr) IV Continuous <Continuous>    MEDICATIONS  (PRN):  oxyCODONE    IR 5 milliGRAM(s) Oral every 6 hours PRN Moderate Pain (4 - 6)            Patient is a 75y old  Female who presented with CAD.  S/P CABG.  Pos operative course complicated by AV Block.  S/P PPM.  Hemodynamically stable.  Improved glycemic control.  Good oxygenation.  Fair urine out put.  Overall doing well.      My plan includes :  Statin and Betablocker.  Dual antiplatelet Rx.  Close hemodynamic, ventilatory and drain monitoring and management  Monitor for arrhythmias and monitor parameters for organ perfusion  Monitor neurologic status  Monitor renal function.  Head of the bed should remain elevated to 45 deg .   Chest PT and IS will be encouraged  Monitor adequacy of oxygenation and ventilation and attempt to wean oxygen  Nutritional goals will be met using po eventually , ensure adequate caloric intake and monitor the same  Stress ulcer and VTE prophylaxis will be achieved    Glycemic control is satisfactory  Electrolytes have been repleted as necessary and wound care has been carried out. Pain control has been achieved.   Aggressive physical therapy and early mobility and ambulation goals will be met   The family was updated about the course and plan  CRITICAL CARE TIME SPENT in evaluation and management, reassessments, review and interpretation of labs and x-rays, ventilator and hemodynamic management, formulating a plan and coordinating care: ___90____ MIN.  Time does not include procedural time.  CTICU ATTENDING     					    Cade Gandhi MD

## 2020-07-26 NOTE — PROGRESS NOTE ADULT - SUBJECTIVE AND OBJECTIVE BOX
CTICU  CRITICAL  CARE  attending     Hand off received 					   Pertinent clinical, laboratory, radiographic, hemodynamic, echocardiographic, respiratory data, microbiologic data and chart were reviewed and analyzed frequently throughout the course of the day and night  Patient seen and examined with CTS/ SH attending at bedside    Pt is a 75y , Female, post op day # 4 s/p CABG x 2V    post op:    Bradyarrhythmia  s/p PPM   acute post H'gic anemia  fluid overload        AV block: AV block      , FAMILY HISTORY:  PAST MEDICAL & SURGICAL HISTORY:  History of osteomyelitis  CAD (coronary artery disease)  PAD (peripheral artery disease)  Diabetes mellitus  HLD (hyperlipidemia)  HTN (hypertension)    Patient is a 75y old  Female who presents with a chief complaint of CAD (25 Jul 2020 22:51)      14 system review was unremarkable    Vital signs, hemodynamic and respiratory parameters were reviewed from the bedside nursing flowsheet.  ICU Vital Signs Last 24 Hrs  T(C): 36.7 (26 Jul 2020 14:00), Max: 36.7 (26 Jul 2020 14:00)  T(F): 98 (26 Jul 2020 14:00), Max: 98 (26 Jul 2020 14:00)  HR: 70 (26 Jul 2020 14:00) (69 - 74)  BP: 141/62 (26 Jul 2020 14:00) (126/62 - 152/67)  BP(mean): 89 (26 Jul 2020 14:00) (89 - 100)  ABP: --  ABP(mean): --  RR: 25 (26 Jul 2020 14:00) (18 - 30)  SpO2: 96% (26 Jul 2020 14:00) (94% - 100%)    Adult Advanced Hemodynamics Last 24 Hrs  CVP(mm Hg): 17 (26 Jul 2020 10:00) (8 - 26)  CVP(cm H2O): --  CO: --  CI: --  PA: --  PA(mean): --  PCWP: --  SVR: --  SVRI: --  PVR: --  PVRI: --,     Intake and output was reviewed and the fluid balance was calculated  Daily     Daily   I&O's Summary    25 Jul 2020 07:01  -  26 Jul 2020 07:00  --------------------------------------------------------  IN: 1462.5 mL / OUT: 1155 mL / NET: 307.5 mL    26 Jul 2020 07:01  -  26 Jul 2020 15:47  --------------------------------------------------------  IN: 605 mL / OUT: 250 mL / NET: 355 mL        All lines and drain sites were assessed  [facility]      POCT Blood Glucose.: 194 mg/dL (26 Jul 2020 11:39)    No acute change in mental status  Auscultation of the chest reveals equal bs  Abdomen is soft  Extremities are warm and well perfused  Wounds appear clean and unremarkable  Antibiotics are periop    labs  CBC Full  -  ( 26 Jul 2020 02:56 )  WBC Count : 11.67 K/uL  RBC Count : 3.10 M/uL  Hemoglobin : 8.7 g/dL  Hematocrit : 26.9 %  Platelet Count - Automated : 114 K/uL  Mean Cell Volume : 86.8 fl  Mean Cell Hemoglobin : 28.1 pg  Mean Cell Hemoglobin Concentration : 32.3 gm/dL  Auto Neutrophil # : x  Auto Lymphocyte # : x  Auto Monocyte # : x  Auto Eosinophil # : x  Auto Basophil # : x  Auto Neutrophil % : x  Auto Lymphocyte % : x  Auto Monocyte % : x  Auto Eosinophil % : x  Auto Basophil % : x    07-26    139  |  106  |  18  ----------------------------<  139<H>  4.4   |  25  |  0.54    Ca    8.1<L>      26 Jul 2020 02:56  Phos  2.1     07-26  Mg     2.0     07-26    TPro  5.3<L>  /  Alb  3.2<L>  /  TBili  0.8  /  DBili  x   /  AST  169<H>  /  ALT  147<H>  /  AlkPhos  540<H>  07-26    PT/INR - ( 26 Jul 2020 02:56 )   PT: 24.4 sec;   INR: 2.11          PTT - ( 26 Jul 2020 02:56 )  PTT:30.4 sec  The current medications were reviewed   MEDICATIONS  (STANDING):  apixaban 5 milliGRAM(s) Oral every 12 hours  aspirin enteric coated 81 milliGRAM(s) Oral daily  chlorhexidine 2% Cloths 1 Application(s) Topical daily  dextrose 50% Injectable 25 Gram(s) IV Push once  dextrose 50% Injectable 50 milliLiter(s) IV Push every 15 minutes  dextrose 50% Injectable 25 milliLiter(s) IV Push every 15 minutes  insulin glargine Injectable (LANTUS) 35 Unit(s) SubCutaneous at bedtime  insulin lispro (HumaLOG) corrective regimen sliding scale   SubCutaneous Before meals and at bedtime  insulin lispro Injectable (HumaLOG) 7 Unit(s) SubCutaneous three times a day before meals  levoFLOXacin  Tablet 500 milliGRAM(s) Oral every 24 hours  metoprolol tartrate 25 milliGRAM(s) Oral two times a day  pantoprazole    Tablet 40 milliGRAM(s) Oral before breakfast  polyethylene glycol 3350 17 Gram(s) Oral daily  senna 2 Tablet(s) Oral at bedtime  sodium chloride 0.9%. 1000 milliLiter(s) (10 mL/Hr) IV Continuous <Continuous>    MEDICATIONS  (PRN):  oxyCODONE    IR 5 milliGRAM(s) Oral every 6 hours PRN Moderate Pain (4 - 6)       PROBLEM LIST/ ASSESSMENT:  HEALTH ISSUES - PROBLEM Dx:  AV block: AV block      ,   Patient is a 75y old  Female who presents with a chief complaint of CAD (25 Jul 2020 22:51)     s/p CABG      My plan includes :  close hemodynamic, ventilatory and drain monitoring and management per post op routine    Monitor for arrhythmias and monitor parameters for organ perfusion  monitor neurologic status  Head of the bed should remain elevated to 45 deg .   chest PT and IS will be encouraged  monitor adequacy of oxygenation and ventilation and attempt to wean oxygen  Nutritional goals will be met using po eventually , ensure adequate caloric intake and montior the same  Stress ulcer and VTE prophylaxis will be achieved    Glycemic control is satisfactory  Electrolytes have been repleted as necessary and wound care has been carried out. Pain control has been achieved.   agressive physical therapy and early mobility and ambulation goals will be met   The family was updated about the course and plan  CRITICAL CARE TIME SPENT in evaluation and management, reassessments, review and interpretation of labs and x-rays, ventilator and hemodynamic management, formulating a plan and coordinating care: __55___ MIN.  Time does not include procedural time.  CTICU ATTENDING     					    William Covington MD

## 2020-07-27 LAB
ALBUMIN SERPL ELPH-MCNC: 3.1 G/DL — LOW (ref 3.3–5)
ALP SERPL-CCNC: 531 U/L — HIGH (ref 40–120)
ALT FLD-CCNC: 198 U/L — HIGH (ref 10–45)
ANION GAP SERPL CALC-SCNC: 11 MMOL/L — SIGNIFICANT CHANGE UP (ref 5–17)
APTT BLD: 30.4 SEC — SIGNIFICANT CHANGE UP (ref 27.5–35.5)
AST SERPL-CCNC: 173 U/L — HIGH (ref 10–40)
BILIRUB SERPL-MCNC: 0.8 MG/DL — SIGNIFICANT CHANGE UP (ref 0.2–1.2)
BUN SERPL-MCNC: 14 MG/DL — SIGNIFICANT CHANGE UP (ref 7–23)
CALCIUM SERPL-MCNC: 8.1 MG/DL — LOW (ref 8.4–10.5)
CHLORIDE SERPL-SCNC: 108 MMOL/L — SIGNIFICANT CHANGE UP (ref 96–108)
CO2 SERPL-SCNC: 26 MMOL/L — SIGNIFICANT CHANGE UP (ref 22–31)
CREAT SERPL-MCNC: 0.53 MG/DL — SIGNIFICANT CHANGE UP (ref 0.5–1.3)
GLUCOSE BLDC GLUCOMTR-MCNC: 112 MG/DL — HIGH (ref 70–99)
GLUCOSE BLDC GLUCOMTR-MCNC: 131 MG/DL — HIGH (ref 70–99)
GLUCOSE BLDC GLUCOMTR-MCNC: 159 MG/DL — HIGH (ref 70–99)
GLUCOSE BLDC GLUCOMTR-MCNC: 244 MG/DL — HIGH (ref 70–99)
GLUCOSE BLDC GLUCOMTR-MCNC: 92 MG/DL — SIGNIFICANT CHANGE UP (ref 70–99)
GLUCOSE SERPL-MCNC: 124 MG/DL — HIGH (ref 70–99)
HCT VFR BLD CALC: 28.1 % — LOW (ref 34.5–45)
HGB BLD-MCNC: 9.2 G/DL — LOW (ref 11.5–15.5)
INR BLD: 2.36 — HIGH (ref 0.88–1.16)
MAGNESIUM SERPL-MCNC: 1.9 MG/DL — SIGNIFICANT CHANGE UP (ref 1.6–2.6)
MCHC RBC-ENTMCNC: 28.4 PG — SIGNIFICANT CHANGE UP (ref 27–34)
MCHC RBC-ENTMCNC: 32.7 GM/DL — SIGNIFICANT CHANGE UP (ref 32–36)
MCV RBC AUTO: 86.7 FL — SIGNIFICANT CHANGE UP (ref 80–100)
NRBC # BLD: 2 /100 WBCS — HIGH (ref 0–0)
PHOSPHATE SERPL-MCNC: 2 MG/DL — LOW (ref 2.5–4.5)
PLATELET # BLD AUTO: 143 K/UL — LOW (ref 150–400)
POTASSIUM SERPL-MCNC: 3.6 MMOL/L — SIGNIFICANT CHANGE UP (ref 3.5–5.3)
POTASSIUM SERPL-SCNC: 3.6 MMOL/L — SIGNIFICANT CHANGE UP (ref 3.5–5.3)
PROT SERPL-MCNC: 5.7 G/DL — LOW (ref 6–8.3)
PROTHROM AB SERPL-ACNC: 27.2 SEC — HIGH (ref 10.6–13.6)
RBC # BLD: 3.24 M/UL — LOW (ref 3.8–5.2)
RBC # FLD: 15.1 % — HIGH (ref 10.3–14.5)
SODIUM SERPL-SCNC: 145 MMOL/L — SIGNIFICANT CHANGE UP (ref 135–145)
WBC # BLD: 10.6 K/UL — HIGH (ref 3.8–10.5)
WBC # FLD AUTO: 10.6 K/UL — HIGH (ref 3.8–10.5)

## 2020-07-27 PROCEDURE — 71045 X-RAY EXAM CHEST 1 VIEW: CPT | Mod: 26

## 2020-07-27 PROCEDURE — 99233 SBSQ HOSP IP/OBS HIGH 50: CPT

## 2020-07-27 PROCEDURE — 99232 SBSQ HOSP IP/OBS MODERATE 35: CPT | Mod: GC

## 2020-07-27 RX ORDER — FUROSEMIDE 40 MG
40 TABLET ORAL EVERY 8 HOURS
Refills: 0 | Status: DISCONTINUED | OUTPATIENT
Start: 2020-07-27 | End: 2020-07-30

## 2020-07-27 RX ORDER — HYDRALAZINE HCL 50 MG
10 TABLET ORAL ONCE
Refills: 0 | Status: COMPLETED | OUTPATIENT
Start: 2020-07-27 | End: 2020-07-27

## 2020-07-27 RX ORDER — INSULIN GLARGINE 100 [IU]/ML
32 INJECTION, SOLUTION SUBCUTANEOUS AT BEDTIME
Refills: 0 | Status: DISCONTINUED | OUTPATIENT
Start: 2020-07-27 | End: 2020-07-28

## 2020-07-27 RX ORDER — METOPROLOL TARTRATE 50 MG
50 TABLET ORAL EVERY 8 HOURS
Refills: 0 | Status: DISCONTINUED | OUTPATIENT
Start: 2020-07-27 | End: 2020-07-31

## 2020-07-27 RX ORDER — SODIUM CHLORIDE 9 MG/ML
3 INJECTION INTRAMUSCULAR; INTRAVENOUS; SUBCUTANEOUS EVERY 8 HOURS
Refills: 0 | Status: DISCONTINUED | OUTPATIENT
Start: 2020-07-27 | End: 2020-07-31

## 2020-07-27 RX ORDER — POTASSIUM PHOSPHATE, MONOBASIC POTASSIUM PHOSPHATE, DIBASIC 236; 224 MG/ML; MG/ML
30 INJECTION, SOLUTION INTRAVENOUS ONCE
Refills: 0 | Status: COMPLETED | OUTPATIENT
Start: 2020-07-27 | End: 2020-07-27

## 2020-07-27 RX ORDER — POTASSIUM CHLORIDE 20 MEQ
20 PACKET (EA) ORAL
Refills: 0 | Status: DISCONTINUED | OUTPATIENT
Start: 2020-07-27 | End: 2020-07-28

## 2020-07-27 RX ADMIN — INSULIN GLARGINE 32 UNIT(S): 100 INJECTION, SOLUTION SUBCUTANEOUS at 21:33

## 2020-07-27 RX ADMIN — APIXABAN 5 MILLIGRAM(S): 2.5 TABLET, FILM COATED ORAL at 07:37

## 2020-07-27 RX ADMIN — Medication 2: at 21:34

## 2020-07-27 RX ADMIN — SODIUM CHLORIDE 3 MILLILITER(S): 9 INJECTION INTRAMUSCULAR; INTRAVENOUS; SUBCUTANEOUS at 13:32

## 2020-07-27 RX ADMIN — Medication 10 MILLIGRAM(S): at 00:15

## 2020-07-27 RX ADMIN — Medication 40 MILLIGRAM(S): at 14:53

## 2020-07-27 RX ADMIN — Medication 7 UNIT(S): at 17:31

## 2020-07-27 RX ADMIN — Medication 25 MILLIGRAM(S): at 12:44

## 2020-07-27 RX ADMIN — APIXABAN 5 MILLIGRAM(S): 2.5 TABLET, FILM COATED ORAL at 19:09

## 2020-07-27 RX ADMIN — Medication 20 MILLIEQUIVALENT(S): at 17:04

## 2020-07-27 RX ADMIN — CHLORHEXIDINE GLUCONATE 1 APPLICATION(S): 213 SOLUTION TOPICAL at 07:29

## 2020-07-27 RX ADMIN — PANTOPRAZOLE SODIUM 40 MILLIGRAM(S): 20 TABLET, DELAYED RELEASE ORAL at 07:38

## 2020-07-27 RX ADMIN — Medication 50 MILLIGRAM(S): at 17:04

## 2020-07-27 RX ADMIN — SODIUM CHLORIDE 3 MILLILITER(S): 9 INJECTION INTRAMUSCULAR; INTRAVENOUS; SUBCUTANEOUS at 21:03

## 2020-07-27 RX ADMIN — Medication 4: at 17:31

## 2020-07-27 RX ADMIN — SENNA PLUS 2 TABLET(S): 8.6 TABLET ORAL at 21:04

## 2020-07-27 RX ADMIN — Medication 40 MILLIGRAM(S): at 21:33

## 2020-07-27 RX ADMIN — Medication 7 UNIT(S): at 07:37

## 2020-07-27 RX ADMIN — Medication 7 UNIT(S): at 12:44

## 2020-07-27 RX ADMIN — POTASSIUM PHOSPHATE, MONOBASIC POTASSIUM PHOSPHATE, DIBASIC 85 MILLIMOLE(S): 236; 224 INJECTION, SOLUTION INTRAVENOUS at 04:58

## 2020-07-27 RX ADMIN — Medication 25 MILLIGRAM(S): at 07:38

## 2020-07-27 RX ADMIN — Medication 81 MILLIGRAM(S): at 12:44

## 2020-07-27 NOTE — PROGRESS NOTE ADULT - SUBJECTIVE AND OBJECTIVE BOX
Patient discussed on morning rounds with Dr. Tavarez    Operation / Date: 7/22/2020 CABG, x3 (LIMA-LAD, SVG-Ramus, RCA)     SUBJECTIVE ASSESSMENT:  Patient      Vital Signs Last 24 Hrs  T(C): 36 (27 Jul 2020 09:16), Max: 36.7 (26 Jul 2020 14:00)  T(F): 96.8 (27 Jul 2020 09:16), Max: 98 (26 Jul 2020 14:00)  HR: 90 (27 Jul 2020 08:00) (69 - 90)  BP: 118/82 (27 Jul 2020 08:00) (118/82 - 192/79)  BP(mean): 117 (27 Jul 2020 08:00) (89 - 117)  RR: 20 (27 Jul 2020 08:00) (18 - 29)  SpO2: 98% (27 Jul 2020 08:00) (94% - 100%)  I&O's Detail    26 Jul 2020 07:01  -  27 Jul 2020 07:00  --------------------------------------------------------  IN:    IV PiggyBack: 125 mL    Oral Fluid: 1050 mL    sodium chloride 0.9%: 30 mL  Total IN: 1205 mL    OUT:    Voided: 1050 mL  Total OUT: 1050 mL    Total NET: 155 mL      27 Jul 2020 07:01  -  27 Jul 2020 09:31  --------------------------------------------------------  IN:  Total IN: 0 mL    OUT:    Voided: 300 mL  Total OUT: 300 mL    Total NET: -300 mL        CHEST TUBE:  No  FIDELINA DRAIN:  No.  EPICARDIAL WIRES: Yes  TIE DOWNS: Yes  JAEGER: No    PHYSICAL EXAM:    GEN: NAD, looks comfortable  Psych: Mood appropriate  Neuro: A&Ox3.  No focal deficits.  Moving all extremities.   HEENT: No obvious abnormalities  CV: S1S2, regular, no murmurs appreciated.  No carotid bruits.  No JVD  Lungs: Clear B/L.  No wheezing, rales or rhonchi  ABD: Soft, non-tender, non-distended.  +Bowel sounds  EXT: Warm and well perfused.  No peripheral edema noted  Musculoskeletal: Moving all extremities with normal ROM, no joint swelling  PV: Pedal pulses palpable  Incision Sites: MSI healing well, no drainage.     LABS:                        9.2    10.60 )-----------( 143      ( 27 Jul 2020 03:48 )             28.1       COUMADIN: No    PT/INR - ( 27 Jul 2020 03:48 )   PT: 27.2 sec;   INR: 2.36          PTT - ( 27 Jul 2020 03:48 )  PTT:30.4 sec    07-27    145  |  108  |  14  ----------------------------<  124<H>  3.6   |  26  |  0.53    Ca    8.1<L>      27 Jul 2020 03:48  Phos  2.0     07-27  Mg     1.9     07-27    TPro  5.7<L>  /  Alb  3.1<L>  /  TBili  0.8  /  DBili  x   /  AST  173<H>  /  ALT  198<H>  /  AlkPhos  531<H>  07-27      MEDICATIONS  (STANDING):  apixaban 5 milliGRAM(s) Oral every 12 hours  aspirin enteric coated 81 milliGRAM(s) Oral daily  dextrose 50% Injectable 25 Gram(s) IV Push once  furosemide   Injectable 40 milliGRAM(s) IV Push every 8 hours  insulin glargine Injectable (LANTUS) 35 Unit(s) SubCutaneous at bedtime  insulin lispro (HumaLOG) corrective regimen sliding scale   SubCutaneous Before meals and at bedtime  insulin lispro Injectable (HumaLOG) 7 Unit(s) SubCutaneous three times a day before meals  levoFLOXacin  Tablet 500 milliGRAM(s) Oral every 24 hours  metoprolol tartrate 25 milliGRAM(s) Oral every 6 hours  pantoprazole    Tablet 40 milliGRAM(s) Oral before breakfast  polyethylene glycol 3350 17 Gram(s) Oral daily  potassium chloride    Tablet ER 20 milliEquivalent(s) Oral two times a day  senna 2 Tablet(s) Oral at bedtime  sodium chloride 0.9% lock flush 3 milliLiter(s) IV Push every 8 hours    MEDICATIONS  (PRN):  oxyCODONE    IR 5 milliGRAM(s) Oral every 6 hours PRN Moderate Pain (4 - 6)      RADIOLOGY & ADDITIONAL TESTS:    < from: Xray Chest 1 View- PORTABLE-Routine (07.26.20 @ 05:30) >  Impression: Small bilateral effusions. Congestion and/or infiltrates    < end of copied text > Patient discussed on morning rounds with Dr. Tavarez    Operation / Date: 7/22/2020 CABG, x3 (LIMA-LAD, SVG-Ramus, RCA)     SUBJECTIVE ASSESSMENT:  Patient denies acute SOB, CP, dizziness, N/V/D, fever or chills.  She does c/o poor appetite and is interested in trying Glucerna.  She does not walk as of yet due to her left leg amputation and her prothesis does not currently fit due to LE edema.  She is barely using her IS, pulling <500mL.  States that she's "too tired' to use it.     Vital Signs Last 24 Hrs  T(C): 36 (27 Jul 2020 09:16), Max: 36.7 (26 Jul 2020 14:00)  T(F): 96.8 (27 Jul 2020 09:16), Max: 98 (26 Jul 2020 14:00)  HR: 90 (27 Jul 2020 08:00) (69 - 90)  BP: 118/82 (27 Jul 2020 08:00) (118/82 - 192/79)  BP(mean): 117 (27 Jul 2020 08:00) (89 - 117)  RR: 20 (27 Jul 2020 08:00) (18 - 29)  SpO2: 98% (27 Jul 2020 08:00) (94% - 100%)  I&O's Detail    26 Jul 2020 07:01  -  27 Jul 2020 07:00  --------------------------------------------------------  IN:    IV PiggyBack: 125 mL    Oral Fluid: 1050 mL    sodium chloride 0.9%: 30 mL  Total IN: 1205 mL    OUT:    Voided: 1050 mL  Total OUT: 1050 mL    Total NET: 155 mL      27 Jul 2020 07:01  -  27 Jul 2020 09:31  --------------------------------------------------------  IN:  Total IN: 0 mL    OUT:    Voided: 300 mL  Total OUT: 300 mL    Total NET: -300 mL        CHEST TUBE:  No  FIDELINA DRAIN:  No.  EPICARDIAL WIRES: No  TIE DOWNS: Yes (4)  JAEGER: No (external female catheter)    PHYSICAL EXAM:    GEN: NAD, looks comfortable.  Sitting up in the recliner.   Psych: Mood appropriate  Neuro: A&Ox3.  No focal deficits.  Moving all extremities.   HEENT: No obvious abnormalities  CV: S1S2, regular, no murmurs appreciated.  No carotid bruits.  No JVD  Lungs: Decreased at bases with some rales B/L L>R.  No wheezing or rhonchi  ABD: Soft, non-tender, non-distended.  +Bowel sounds  EXT: Warm and well perfused.  No peripheral edema noted  Musculoskeletal: Moving all extremities with normal ROM, no joint swelling  PV: Rt side DP palpable.  Left leg BKA.  +peripheral edema, 1+ pitting.   Incision Sites: MSI healing well, no drainage.     LABS:                        9.2    10.60 )-----------( 143      ( 27 Jul 2020 03:48 )             28.1       COUMADIN: No    PT/INR - ( 27 Jul 2020 03:48 )   PT: 27.2 sec;   INR: 2.36          PTT - ( 27 Jul 2020 03:48 )  PTT:30.4 sec    07-27    145  |  108  |  14  ----------------------------<  124<H>  3.6   |  26  |  0.53    Ca    8.1<L>      27 Jul 2020 03:48  Phos  2.0     07-27  Mg     1.9     07-27    TPro  5.7<L>  /  Alb  3.1<L>  /  TBili  0.8  /  DBili  x   /  AST  173<H>  /  ALT  198<H>  /  AlkPhos  531<H>  07-27      MEDICATIONS  (STANDING):  apixaban 5 milliGRAM(s) Oral every 12 hours  aspirin enteric coated 81 milliGRAM(s) Oral daily  dextrose 50% Injectable 25 Gram(s) IV Push once  furosemide   Injectable 40 milliGRAM(s) IV Push every 8 hours  insulin glargine Injectable (LANTUS) 35 Unit(s) SubCutaneous at bedtime  insulin lispro (HumaLOG) corrective regimen sliding scale   SubCutaneous Before meals and at bedtime  insulin lispro Injectable (HumaLOG) 7 Unit(s) SubCutaneous three times a day before meals  levoFLOXacin  Tablet 500 milliGRAM(s) Oral every 24 hours  metoprolol tartrate 25 milliGRAM(s) Oral every 6 hours  pantoprazole    Tablet 40 milliGRAM(s) Oral before breakfast  polyethylene glycol 3350 17 Gram(s) Oral daily  potassium chloride    Tablet ER 20 milliEquivalent(s) Oral two times a day  senna 2 Tablet(s) Oral at bedtime  sodium chloride 0.9% lock flush 3 milliLiter(s) IV Push every 8 hours    MEDICATIONS  (PRN):  oxyCODONE    IR 5 milliGRAM(s) Oral every 6 hours PRN Moderate Pain (4 - 6)      RADIOLOGY & ADDITIONAL TESTS:    < from: Xray Chest 1 View- PORTABLE-Routine (07.26.20 @ 05:30) >  Impression: Small bilateral effusions. Congestion and/or infiltrates    < end of copied text >

## 2020-07-27 NOTE — PROGRESS NOTE ADULT - SUBJECTIVE AND OBJECTIVE BOX
CTICU  CRITICAL  CARE  attending     Hand off received 					   Pertinent clinical, laboratory, radiographic, hemodynamic, echocardiographic, respiratory data, microbiologic data and chart were reviewed and analyzed frequently throughout the course of the day and night  Patient seen and examined with CTS/ SH attending at bedside  Pt is a 75y , Female, HEALTH ISSUES - PROBLEM Dx:  AV block: AV block      , FAMILY HISTORY:  PAST MEDICAL & SURGICAL HISTORY:  History of osteomyelitis  CAD (coronary artery disease)  PAD (peripheral artery disease)  Diabetes mellitus  HLD (hyperlipidemia)  HTN (hypertension)    Patient is a 75y old  Female who presents with a chief complaint of CAD (27 Jul 2020 17:09)      14 system review was unremarkable    Vital signs, hemodynamic and respiratory parameters were reviewed from the bedside nursing flowsheet.  ICU Vital Signs Last 24 Hrs  T(C): 36.3 (27 Jul 2020 21:21), Max: 36.3 (27 Jul 2020 21:21)  T(F): 97.3 (27 Jul 2020 21:21), Max: 97.3 (27 Jul 2020 21:21)  HR: 75 (27 Jul 2020 19:07) (69 - 96)  BP: 154/70 (27 Jul 2020 19:07) (118/82 - 192/79)  BP(mean): 101 (27 Jul 2020 19:07) (85 - 120)  ABP: --  ABP(mean): --  RR: 22 (27 Jul 2020 19:07) (18 - 28)  SpO2: 98% (27 Jul 2020 19:07) (97% - 100%)    Adult Advanced Hemodynamics Last 24 Hrs  CVP(mm Hg): --  CVP(cm H2O): --  CO: --  CI: --  PA: --  PA(mean): --  PCWP: --  SVR: --  SVRI: --  PVR: --  PVRI: --,     Intake and output was reviewed and the fluid balance was calculated  Daily     Daily   I&O's Summary    26 Jul 2020 07:01  -  27 Jul 2020 07:00  --------------------------------------------------------  IN: 1205 mL / OUT: 1050 mL / NET: 155 mL    27 Jul 2020 07:01  -  27 Jul 2020 21:38  --------------------------------------------------------  IN: 280 mL / OUT: 1750 mL / NET: -1470 mL        All lines and drain sites were assessed  Glycemic trend was reviewedCAPILLARY BLOOD GLUCOSE      POCT Blood Glucose.: 159 mg/dL (27 Jul 2020 21:13)    No acute change in mental status  Auscultation of the chest reveals equal bs  Abdomen is soft  Extremities are warm and well perfused  Wounds appear clean and unremarkable  Antibiotics are periop    labs  CBC Full  -  ( 27 Jul 2020 03:48 )  WBC Count : 10.60 K/uL  RBC Count : 3.24 M/uL  Hemoglobin : 9.2 g/dL  Hematocrit : 28.1 %  Platelet Count - Automated : 143 K/uL  Mean Cell Volume : 86.7 fl  Mean Cell Hemoglobin : 28.4 pg  Mean Cell Hemoglobin Concentration : 32.7 gm/dL  Auto Neutrophil # : x  Auto Lymphocyte # : x  Auto Monocyte # : x  Auto Eosinophil # : x  Auto Basophil # : x  Auto Neutrophil % : x  Auto Lymphocyte % : x  Auto Monocyte % : x  Auto Eosinophil % : x  Auto Basophil % : x    07-27    145  |  108  |  14  ----------------------------<  124<H>  3.6   |  26  |  0.53    Ca    8.1<L>      27 Jul 2020 03:48  Phos  2.0     07-27  Mg     1.9     07-27    TPro  5.7<L>  /  Alb  3.1<L>  /  TBili  0.8  /  DBili  x   /  AST  173<H>  /  ALT  198<H>  /  AlkPhos  531<H>  07-27    PT/INR - ( 27 Jul 2020 03:48 )   PT: 27.2 sec;   INR: 2.36          PTT - ( 27 Jul 2020 03:48 )  PTT:30.4 sec  The current medications were reviewed   MEDICATIONS  (STANDING):  apixaban 5 milliGRAM(s) Oral every 12 hours  aspirin enteric coated 81 milliGRAM(s) Oral daily  dextrose 50% Injectable 25 Gram(s) IV Push once  furosemide   Injectable 40 milliGRAM(s) IV Push every 8 hours  insulin glargine Injectable (LANTUS) 32 Unit(s) SubCutaneous at bedtime  insulin lispro (HumaLOG) corrective regimen sliding scale   SubCutaneous Before meals and at bedtime  insulin lispro Injectable (HumaLOG) 7 Unit(s) SubCutaneous three times a day before meals  levoFLOXacin  Tablet 500 milliGRAM(s) Oral every 24 hours  metoprolol tartrate 50 milliGRAM(s) Oral every 8 hours  pantoprazole    Tablet 40 milliGRAM(s) Oral before breakfast  polyethylene glycol 3350 17 Gram(s) Oral daily  potassium chloride    Tablet ER 20 milliEquivalent(s) Oral two times a day  senna 2 Tablet(s) Oral at bedtime  sodium chloride 0.9% lock flush 3 milliLiter(s) IV Push every 8 hours    MEDICATIONS  (PRN):  oxyCODONE    IR 5 milliGRAM(s) Oral every 6 hours PRN Moderate Pain (4 - 6)       PROBLEM LIST/ ASSESSMENT:  HEALTH ISSUES - PROBLEM Dx:  AV block: AV block      ,   Patient is a 75y old  Female who presents with a chief complaint of CAD (27 Jul 2020 17:09)     s/p cardiac surgery                My plan includes :  close hemodynamic, ventilatory and drain monitoring and management per post op routine    Monitor for arrhythmias and monitor parameters for organ perfusion  beta blockade not administered due to hemodynamic instability and bradycardia  monitor neurologic status  Head of the bed should remain elevated to 45 deg .   chest PT and IS will be encouraged  monitor adequacy of oxygenation and ventilation and attempt to wean oxygen  antibiotic regimen will be tailored to the clinical, laboratory and microbiologic data  Nutritional goals will be met using po eventually , ensure adequate caloric intake and montior the same  Stress ulcer and VTE prophylaxis will be achieved    Glycemic control is satisfactory  Electrolytes have been repleted as necessary and wound care has been carried out. Pain control has been achieved.   agressive physical therapy and early mobility and ambulation goals will be met   The family was updated about the course and plan  CRITICAL CARE TIME personally provided by me  in evaluation and management, reassessments, review and interpretation of labs and x-rays, ventilator and hemodynamic management, formulating a plan and coordinating care: ___90____ MIN.  Time does not include procedural time. Time spent was non routine post-operarive caRE and included multiple and repeated evaluations at the bedside  CTICU ATTENDING     					    Tino Madrigal MD

## 2020-07-27 NOTE — PROGRESS NOTE ADULT - ASSESSMENT
This is a 76 y/o female with PMHx of 3VCAD (diagnostic cath 8/2019 @ [place]), HTN, HLD, Uncontrolled Insulin Dependent DM II (Hgb A1C 11.8% on 7/17/2020), PAD s/p LSFA/Popliteal/TP trunk and Posterior Tibial PTA and stent 8/2019, (s/p peripheral angiogram @ [place] 5/2020 with "ballooning and stenting" per patient- on Eliquis), Osteomyelitis s/p AKA LLE 11/2019 who presented to cardiologist Dr. Yepez for follow-up.  Patient reported RIBEIRO and was brought in for cardiac cath on 7/17/2020 revealing severe 3VCAD, EF 65%. Patient consulted by Dr. Tavarez for CABG and came back for elective surgery.  On 7/22/2020 s/p CABGx3, EF normal.  In the OR received 2 units PRBC.  Paced for underlying asystole.  Arrived to CTICU on levophed/Vasopressin.  Extubated POD #1.  POD #2 Micra placed with EPS.  POD #3 LFTs elevated, RUQ U/S done with GB thickening, so patient started on Levaquin.  POD #4 diuresed.  Today, POD #5 transferred to [facility], lasix increased with goal net negative 2L /day.  Holding statin due to LFTs.     Neuro:  -Pain controlled, oxycocone PRN.  (No tylenol or percocet due to elevated LFTs).      CV:  -HR/BP controlled  -Micra placed post op for no underlying rhythm.   -Beta blocker, ASA, NOAC.  No statin for now due to elevated LFTs.   -Lasix for diruesis    Pulm:  -CXR stable  -O2 sat stable on room air.     GI:   -Protonix for prophylaxis.    -PO diet.    -Hold statin for now.    -Levaquin for ?gallbladder sludge.     :  -Follow I's and O's.    -Goal is net negative 2L / day.  Lasix increased to 40IV Q8 hours.  K+ supplement.  -Watch renal function     ID:  -No issues.    -WBC 10, no fevers.    Heme:   -Stable H/H, SC heparin.      Dispo:   Home likely Wednesday. This is a 76 y/o female with PMHx of 3VCAD (diagnostic cath 8/2019 @ [place]), HTN, HLD, Uncontrolled Insulin Dependent DM II (Hgb A1C 11.8% on 7/17/2020), PAD s/p LSFA/Popliteal/TP trunk and Posterior Tibial PTA and stent 8/2019, (s/p peripheral angiogram @ [place] 5/2020 with "ballooning and stenting" per patient- on Eliquis), Osteomyelitis s/p AKA LLE 11/2019 who presented to cardiologist Dr. Yepez for follow-up.  Patient reported RIBEIRO and was brought in for cardiac cath on 7/17/2020 revealing severe 3VCAD, EF 65%. Patient consulted by Dr. Tavarez for CABG and came back for elective surgery.  On 7/22/2020 s/p CABGx3, EF normal.  In the OR received 2 units PRBC.  Paced for underlying asystole.  Arrived to CTICU on levophed/Vasopressin.  Extubated POD #1.  POD #2 Micra placed with EPS.  POD #3 LFTs elevated, RUQ U/S done with GB thickening, so patient started on Levaquin.  POD #4 diuresed.  Today, POD #5 transferred to [facility], lasix increased with goal net negative 2L /day.  Holding statin due to LFTs.     Neuro:  -Pain controlled, oxycocone PRN.  (No tylenol or percocet due to elevated LFTs).      CV:  -HR/BP controlled  -Micra placed post op for no underlying rhythm.   -Beta blocker, ASA, NOAC.  No statin for now due to elevated LFTs.   -Lasix for diruesis    Pulm:  -CXR stable wtih "small B/L pleural effusions".   -O2 sat stable on room air.   -Encouraged more IS use** and did education on how to use it.     GI:   -Protonix for prophylaxis.    -PO diet.  **Adding Glucerna for poor appetite.   -Hold statin for now.    -Levaquin for ?gallbladder sludge.     :  -Follow I's and O's.    -Goal is net negative 2L / day.  Lasix increased to 40IV Q8 hours.  K+ supplement.  -Watch renal function     ID:  -No issues.    -WBC 10, no fevers.    Heme:   -Stable H/H, SC heparin.      Dispo:   Home likely Wednesday.

## 2020-07-27 NOTE — PROGRESS NOTE ADULT - SUBJECTIVE AND OBJECTIVE BOX
INTERVAL HPI/OVERNIGHT EVENTS:    Patient is a 75y old  Female who presents with a chief complaint of CAD (27 Jul 2020 09:31)  pt was seen and examined at the bedside. S/P CABG.  Pt didn't have appetite, pt had tea for breakfast and soup for lunch.   pt started on Glucerna.       Pt reports the following symptoms:    CONSTITUTIONAL:  Negative fever or chills  CARDIOVASCULAR:  Negative for chest pain or palpitations  RESPIRATORY:  Negative for cough, wheezing  GASTROINTESTINAL:  Negative for nausea, vomiting, diarrhea, constipation, or abdominal pain  GENITOURINARY:  Negative frequency, urgency or dysuria  NEUROLOGIC:  No headache, confusion    MEDICATIONS  (STANDING):  apixaban 5 milliGRAM(s) Oral every 12 hours  aspirin enteric coated 81 milliGRAM(s) Oral daily  dextrose 50% Injectable 25 Gram(s) IV Push once  furosemide   Injectable 40 milliGRAM(s) IV Push every 8 hours  insulin glargine Injectable (LANTUS) 32 Unit(s) SubCutaneous at bedtime  insulin lispro (HumaLOG) corrective regimen sliding scale   SubCutaneous Before meals and at bedtime  insulin lispro Injectable (HumaLOG) 7 Unit(s) SubCutaneous three times a day before meals  levoFLOXacin  Tablet 500 milliGRAM(s) Oral every 24 hours  metoprolol tartrate 50 milliGRAM(s) Oral every 8 hours  pantoprazole    Tablet 40 milliGRAM(s) Oral before breakfast  polyethylene glycol 3350 17 Gram(s) Oral daily  potassium chloride    Tablet ER 20 milliEquivalent(s) Oral two times a day  senna 2 Tablet(s) Oral at bedtime  sodium chloride 0.9% lock flush 3 milliLiter(s) IV Push every 8 hours    MEDICATIONS  (PRN):  oxyCODONE    IR 5 milliGRAM(s) Oral every 6 hours PRN Moderate Pain (4 - 6)      Past medical history reviewed  Family history reviewed  Social history reviewed    PHYSICAL EXAM  Vital Signs Last 24 Hrs  T(C): 36.2 (27 Jul 2020 17:05), Max: 36.4 (26 Jul 2020 21:19)  T(F): 97.1 (27 Jul 2020 17:05), Max: 97.5 (26 Jul 2020 21:19)  HR: 87 (27 Jul 2020 17:01) (69 - 96)  BP: 173/74 (27 Jul 2020 17:01) (118/82 - 192/79)  BP(mean): 115 (27 Jul 2020 17:01) (85 - 120)  RR: 24 (27 Jul 2020 17:01) (18 - 29)  SpO2: 97% (27 Jul 2020 17:01) (95% - 100%)  Constitutional:  in NAD.   Neck: no thyromegaly or palpable thyroid nodules   Respiratory: lungs CTAB.  Cardiovascular: normal S1 and S2, s/p CACBG   GI: soft, NT/ND, no masses/HSM appreciated.  Skin: no visible rashes/lesions  Psychiatric: AAO x 3, normal affect/mood.  Ext: no edema.     LABS:                        9.2    10.60 )-----------( 143      ( 27 Jul 2020 03:48 )             28.1     07-27    145  |  108  |  14  ----------------------------<  124<H>  3.6   |  26  |  0.53    Ca    8.1<L>      27 Jul 2020 03:48  Phos  2.0     07-27  Mg     1.9     07-27    TPro  5.7<L>  /  Alb  3.1<L>  /  TBili  0.8  /  DBili  x   /  AST  173<H>  /  ALT  198<H>  /  AlkPhos  531<H>  07-27    PT/INR - ( 27 Jul 2020 03:48 )   PT: 27.2 sec;   INR: 2.36          PTT - ( 27 Jul 2020 03:48 )  PTT:30.4 sec    Thyroid Stimulating Hormone, Serum: 2.238 uIU/mL (07-17 @ 09:58)      HbA1C: 11.8 % (07-17 @ 09:58)      CAPILLARY BLOOD GLUCOSE      POCT Blood Glucose.: 92 mg/dL (27 Jul 2020 12:09)  POCT Blood Glucose.: 112 mg/dL (27 Jul 2020 07:28)  POCT Blood Glucose.: 131 mg/dL (27 Jul 2020 03:05)  POCT Blood Glucose.: 154 mg/dL (26 Jul 2020 21:02)      Cholesterol, Serum: 172 mg/dL (07-17-20 @ 09:58)  HDL Cholesterol, Serum: 50 mg/dL (07-17-20 @ 09:58)  Triglycerides, Serum: 89 mg/dL (07-17-20 @ 09:58)  Direct LDL: 104 mg/dL (07-17-20 @ 09:58)      A/P:75y Female with PMHx known 3VCAD (diagnostic cath 8/2019 @ [place]), HTN, HLD, Uncontrolled Insulin Dependent DM II (Hgb A1C 11.8% on 7/17/2020), PAD s/p LSFA/Popliteal/TP trunk and Posterior Tibial PTA and stent 8/2019  (s/p peripheral angiogram @ [place] 5/2020 with "ballooning and stenting" per patient- on Eliquis, Osteomyelitis s/p AKA LLE 11/2019 who presented to cardiologist Dr. Yepez for follow-up.  s/p CABG on 7/22/2020. s/p insulin drip. Undergoing endocrine consult for DM management.    1.  DM  Hgb A1C 11.8  Please decrease lantus to 34 units at night.   Please c/w lispro  to 7   units before each meal  Please continue lispro moderate sliding scale four times daily with meals and at bedtime    Pt's fingerstick glucose goal is 80- 180    Will continue to monitor     For discharge, pt can continue    Pt can follow up at discharge with [facility] by calling  to make an appointment.    discussed case with Dr. stratton  and update primary team INTERVAL HPI/OVERNIGHT EVENTS:    Patient is a 75y old  Female who presents with a chief complaint of CAD (2020 09:31)  pt was seen and examined at the bedside. S/P CABG.  Pt didn't have appetite, pt had tea with oat meal for breakfast and soup for lunch.   pt started on Glucerna. 3AM fsg today: 131.      FSG & Insulin received:    Yesterday:  pre-dinner fs  nutritional lispro  7  units + 4  units lispro SS  bedtime fs  lantus 35  units     Today:  pre-breakfast fs  nutritional lispro 7  units  pre-lunch fs  nutritional lispro 7  units    Pt reports the following symptoms:    CONSTITUTIONAL:  Negative fever or chills  CARDIOVASCULAR:  Negative for chest pain or palpitations  RESPIRATORY:  Negative for cough, wheezing  GASTROINTESTINAL:  Negative for nausea, vomiting, diarrhea, constipation, or abdominal pain  GENITOURINARY:  Negative frequency, urgency or dysuria  NEUROLOGIC:  No headache, confusion    MEDICATIONS  (STANDING):  apixaban 5 milliGRAM(s) Oral every 12 hours  aspirin enteric coated 81 milliGRAM(s) Oral daily  dextrose 50% Injectable 25 Gram(s) IV Push once  furosemide   Injectable 40 milliGRAM(s) IV Push every 8 hours  insulin glargine Injectable (LANTUS) 32 Unit(s) SubCutaneous at bedtime  insulin lispro (HumaLOG) corrective regimen sliding scale   SubCutaneous Before meals and at bedtime  insulin lispro Injectable (HumaLOG) 7 Unit(s) SubCutaneous three times a day before meals  levoFLOXacin  Tablet 500 milliGRAM(s) Oral every 24 hours  metoprolol tartrate 50 milliGRAM(s) Oral every 8 hours  pantoprazole    Tablet 40 milliGRAM(s) Oral before breakfast  polyethylene glycol 3350 17 Gram(s) Oral daily  potassium chloride    Tablet ER 20 milliEquivalent(s) Oral two times a day  senna 2 Tablet(s) Oral at bedtime  sodium chloride 0.9% lock flush 3 milliLiter(s) IV Push every 8 hours    MEDICATIONS  (PRN):  oxyCODONE    IR 5 milliGRAM(s) Oral every 6 hours PRN Moderate Pain (4 - 6)      Past medical history reviewed  Family history reviewed  Social history reviewed    PHYSICAL EXAM  Vital Signs Last 24 Hrs  T(C): 36.2 (2020 17:05), Max: 36.4 (2020 21:19)  T(F): 97.1 (2020 17:05), Max: 97.5 (2020 21:19)  HR: 87 (2020 17:01) (69 - 96)  BP: 173/74 (2020 17:01) (118/82 - 192/79)  BP(mean): 115 (2020 17:01) (85 - 120)  RR: 24 (2020 17:01) (18 - 29)  SpO2: 97% (2020 17:01) (95% - 100%)  Constitutional:  in NAD.   Neck: no thyromegaly or palpable thyroid nodules   Respiratory: lungs CTAB.  Cardiovascular: normal S1 and S2, s/p CACBG   GI: soft, NT/ND, no masses/HSM appreciated.  Skin: no visible rashes/lesions  Psychiatric: AAO x 3, normal affect/mood.  Ext: no edema.     LABS:                        9.2    10.60 )-----------( 143      ( 2020 03:48 )             28.1         145  |  108  |  14  ----------------------------<  124<H>  3.6   |  26  |  0.53    Ca    8.1<L>      2020 03:48  Phos  2.0       Mg     1.9         TPro  5.7<L>  /  Alb  3.1<L>  /  TBili  0.8  /  DBili  x   /  AST  173<H>  /  ALT  198<H>  /  AlkPhos  531<H>      PT/INR - ( 2020 03:48 )   PT: 27.2 sec;   INR: 2.36          PTT - ( 2020 03:48 )  PTT:30.4 sec    Thyroid Stimulating Hormone, Serum: 2.238 uIU/mL ( @ 09:58)      HbA1C: 11.8 % ( @ 09:58)      CAPILLARY BLOOD GLUCOSE      POCT Blood Glucose.: 92 mg/dL (2020 12:09)  POCT Blood Glucose.: 112 mg/dL (2020 07:28)  POCT Blood Glucose.: 131 mg/dL (2020 03:05)  POCT Blood Glucose.: 154 mg/dL (2020 21:02)      Cholesterol, Serum: 172 mg/dL (20 @ 09:58)  HDL Cholesterol, Serum: 50 mg/dL (20 @ 09:58)  Triglycerides, Serum: 89 mg/dL (20 @ 09:58)  Direct LDL: 104 mg/dL (20 @ 09:58)      A/P:75y Female with PMHx known 3VCAD (diagnostic cath 2019 @ [place]), HTN, HLD, Uncontrolled Insulin Dependent DM II (Hgb A1C 11.8% on 2020), PAD s/p LSFA/Popliteal/TP trunk and Posterior Tibial PTA and stent 2019  (s/p peripheral angiogram @ [place] 2020 with "ballooning and stenting" per patient- on Eliquis, Osteomyelitis s/p AKA LLE 2019 who presented to cardiologist Dr. Yepez for follow-up.  s/p CABG on 2020. s/p insulin drip. Undergoing endocrine consult for DM management.    1.  DM  Hgb A1C 11.8  Please decrease lantus to 34 units at night.   Please c/w lispro   7   units before each meal  Please continue lispro moderate sliding scale four times daily with meals and at bedtime    Pt's fingerstick glucose goal is 80- 180    Will continue to monitor     For discharge, pt can continue    Pt can follow up at discharge with [facility] by calling  to make an appointment.    discussed case with Dr. stratton  and update primary team

## 2020-07-28 LAB
ALBUMIN SERPL ELPH-MCNC: 2.9 G/DL — LOW (ref 3.3–5)
ALP SERPL-CCNC: 457 U/L — HIGH (ref 40–120)
ALT FLD-CCNC: 170 U/L — HIGH (ref 10–45)
ANION GAP SERPL CALC-SCNC: 9 MMOL/L — SIGNIFICANT CHANGE UP (ref 5–17)
APTT BLD: 31.4 SEC — SIGNIFICANT CHANGE UP (ref 27.5–35.5)
AST SERPL-CCNC: 104 U/L — HIGH (ref 10–40)
BASOPHILS # BLD AUTO: 0.01 K/UL — SIGNIFICANT CHANGE UP (ref 0–0.2)
BASOPHILS NFR BLD AUTO: 0.1 % — SIGNIFICANT CHANGE UP (ref 0–2)
BILIRUB SERPL-MCNC: 0.8 MG/DL — SIGNIFICANT CHANGE UP (ref 0.2–1.2)
BUN SERPL-MCNC: 9 MG/DL — SIGNIFICANT CHANGE UP (ref 7–23)
CALCIUM SERPL-MCNC: 8.2 MG/DL — LOW (ref 8.4–10.5)
CHLORIDE SERPL-SCNC: 101 MMOL/L — SIGNIFICANT CHANGE UP (ref 96–108)
CO2 SERPL-SCNC: 27 MMOL/L — SIGNIFICANT CHANGE UP (ref 22–31)
CREAT SERPL-MCNC: 0.49 MG/DL — LOW (ref 0.5–1.3)
EOSINOPHIL # BLD AUTO: 0.08 K/UL — SIGNIFICANT CHANGE UP (ref 0–0.5)
EOSINOPHIL NFR BLD AUTO: 0.8 % — SIGNIFICANT CHANGE UP (ref 0–6)
GLUCOSE BLDC GLUCOMTR-MCNC: 104 MG/DL — HIGH (ref 70–99)
GLUCOSE BLDC GLUCOMTR-MCNC: 133 MG/DL — HIGH (ref 70–99)
GLUCOSE BLDC GLUCOMTR-MCNC: 142 MG/DL — HIGH (ref 70–99)
GLUCOSE BLDC GLUCOMTR-MCNC: 144 MG/DL — HIGH (ref 70–99)
GLUCOSE BLDC GLUCOMTR-MCNC: 168 MG/DL — HIGH (ref 70–99)
GLUCOSE BLDC GLUCOMTR-MCNC: 309 MG/DL — HIGH (ref 70–99)
GLUCOSE BLDC GLUCOMTR-MCNC: 69 MG/DL — LOW (ref 70–99)
GLUCOSE SERPL-MCNC: 101 MG/DL — HIGH (ref 70–99)
HCT VFR BLD CALC: 30.6 % — LOW (ref 34.5–45)
HGB BLD-MCNC: 9.8 G/DL — LOW (ref 11.5–15.5)
IMM GRANULOCYTES NFR BLD AUTO: 0.9 % — SIGNIFICANT CHANGE UP (ref 0–1.5)
INR BLD: 2.23 — HIGH (ref 0.88–1.16)
LYMPHOCYTES # BLD AUTO: 1.64 K/UL — SIGNIFICANT CHANGE UP (ref 1–3.3)
LYMPHOCYTES # BLD AUTO: 16.2 % — SIGNIFICANT CHANGE UP (ref 13–44)
MAGNESIUM SERPL-MCNC: 1.9 MG/DL — SIGNIFICANT CHANGE UP (ref 1.6–2.6)
MCHC RBC-ENTMCNC: 28 PG — SIGNIFICANT CHANGE UP (ref 27–34)
MCHC RBC-ENTMCNC: 32 GM/DL — SIGNIFICANT CHANGE UP (ref 32–36)
MCV RBC AUTO: 87.4 FL — SIGNIFICANT CHANGE UP (ref 80–100)
MONOCYTES # BLD AUTO: 0.97 K/UL — HIGH (ref 0–0.9)
MONOCYTES NFR BLD AUTO: 9.6 % — SIGNIFICANT CHANGE UP (ref 2–14)
NEUTROPHILS # BLD AUTO: 7.32 K/UL — SIGNIFICANT CHANGE UP (ref 1.8–7.4)
NEUTROPHILS NFR BLD AUTO: 72.4 % — SIGNIFICANT CHANGE UP (ref 43–77)
NRBC # BLD: 0 /100 WBCS — SIGNIFICANT CHANGE UP (ref 0–0)
PLATELET # BLD AUTO: 198 K/UL — SIGNIFICANT CHANGE UP (ref 150–400)
POTASSIUM SERPL-MCNC: 3.4 MMOL/L — LOW (ref 3.5–5.3)
POTASSIUM SERPL-SCNC: 3.4 MMOL/L — LOW (ref 3.5–5.3)
PROT SERPL-MCNC: 5.8 G/DL — LOW (ref 6–8.3)
PROTHROM AB SERPL-ACNC: 25.8 SEC — HIGH (ref 10.6–13.6)
RBC # BLD: 3.5 M/UL — LOW (ref 3.8–5.2)
RBC # FLD: 15.5 % — HIGH (ref 10.3–14.5)
SODIUM SERPL-SCNC: 137 MMOL/L — SIGNIFICANT CHANGE UP (ref 135–145)
WBC # BLD: 10.11 K/UL — SIGNIFICANT CHANGE UP (ref 3.8–10.5)
WBC # FLD AUTO: 10.11 K/UL — SIGNIFICANT CHANGE UP (ref 3.8–10.5)

## 2020-07-28 PROCEDURE — 71045 X-RAY EXAM CHEST 1 VIEW: CPT | Mod: 26

## 2020-07-28 PROCEDURE — 99231 SBSQ HOSP IP/OBS SF/LOW 25: CPT | Mod: GC

## 2020-07-28 RX ORDER — AMLODIPINE BESYLATE 2.5 MG/1
5 TABLET ORAL DAILY
Refills: 0 | Status: DISCONTINUED | OUTPATIENT
Start: 2020-07-28 | End: 2020-07-28

## 2020-07-28 RX ORDER — INSULIN GLARGINE 100 [IU]/ML
24 INJECTION, SOLUTION SUBCUTANEOUS AT BEDTIME
Refills: 0 | Status: DISCONTINUED | OUTPATIENT
Start: 2020-07-28 | End: 2020-07-30

## 2020-07-28 RX ORDER — MAGNESIUM OXIDE 400 MG ORAL TABLET 241.3 MG
800 TABLET ORAL ONCE
Refills: 0 | Status: COMPLETED | OUTPATIENT
Start: 2020-07-28 | End: 2020-07-28

## 2020-07-28 RX ORDER — POTASSIUM CHLORIDE 20 MEQ
20 PACKET (EA) ORAL ONCE
Refills: 0 | Status: COMPLETED | OUTPATIENT
Start: 2020-07-28 | End: 2020-07-28

## 2020-07-28 RX ORDER — AMLODIPINE BESYLATE 2.5 MG/1
10 TABLET ORAL DAILY
Refills: 0 | Status: DISCONTINUED | OUTPATIENT
Start: 2020-07-29 | End: 2020-07-31

## 2020-07-28 RX ORDER — POTASSIUM CHLORIDE 20 MEQ
40 PACKET (EA) ORAL EVERY 12 HOURS
Refills: 0 | Status: DISCONTINUED | OUTPATIENT
Start: 2020-07-28 | End: 2020-07-30

## 2020-07-28 RX ORDER — AMLODIPINE BESYLATE 2.5 MG/1
5 TABLET ORAL ONCE
Refills: 0 | Status: COMPLETED | OUTPATIENT
Start: 2020-07-28 | End: 2020-07-28

## 2020-07-28 RX ADMIN — PANTOPRAZOLE SODIUM 40 MILLIGRAM(S): 20 TABLET, DELAYED RELEASE ORAL at 06:45

## 2020-07-28 RX ADMIN — Medication 8: at 17:27

## 2020-07-28 RX ADMIN — Medication 40 MILLIEQUIVALENT(S): at 09:20

## 2020-07-28 RX ADMIN — Medication 7 UNIT(S): at 12:55

## 2020-07-28 RX ADMIN — Medication 7 UNIT(S): at 17:27

## 2020-07-28 RX ADMIN — APIXABAN 5 MILLIGRAM(S): 2.5 TABLET, FILM COATED ORAL at 06:45

## 2020-07-28 RX ADMIN — Medication 20 MILLIEQUIVALENT(S): at 05:34

## 2020-07-28 RX ADMIN — APIXABAN 5 MILLIGRAM(S): 2.5 TABLET, FILM COATED ORAL at 17:32

## 2020-07-28 RX ADMIN — Medication 40 MILLIEQUIVALENT(S): at 21:25

## 2020-07-28 RX ADMIN — Medication 20 MILLIEQUIVALENT(S): at 09:20

## 2020-07-28 RX ADMIN — AMLODIPINE BESYLATE 5 MILLIGRAM(S): 2.5 TABLET ORAL at 13:24

## 2020-07-28 RX ADMIN — Medication 50 MILLIGRAM(S): at 03:34

## 2020-07-28 RX ADMIN — Medication 50 MILLIGRAM(S): at 17:32

## 2020-07-28 RX ADMIN — SODIUM CHLORIDE 3 MILLILITER(S): 9 INJECTION INTRAMUSCULAR; INTRAVENOUS; SUBCUTANEOUS at 05:34

## 2020-07-28 RX ADMIN — SENNA PLUS 2 TABLET(S): 8.6 TABLET ORAL at 21:25

## 2020-07-28 RX ADMIN — SODIUM CHLORIDE 3 MILLILITER(S): 9 INJECTION INTRAMUSCULAR; INTRAVENOUS; SUBCUTANEOUS at 21:21

## 2020-07-28 RX ADMIN — AMLODIPINE BESYLATE 5 MILLIGRAM(S): 2.5 TABLET ORAL at 09:20

## 2020-07-28 RX ADMIN — INSULIN GLARGINE 24 UNIT(S): 100 INJECTION, SOLUTION SUBCUTANEOUS at 21:25

## 2020-07-28 RX ADMIN — Medication 81 MILLIGRAM(S): at 09:20

## 2020-07-28 RX ADMIN — Medication 40 MILLIGRAM(S): at 05:34

## 2020-07-28 RX ADMIN — Medication 40 MILLIGRAM(S): at 21:24

## 2020-07-28 RX ADMIN — Medication 40 MILLIGRAM(S): at 13:24

## 2020-07-28 RX ADMIN — SODIUM CHLORIDE 3 MILLILITER(S): 9 INJECTION INTRAMUSCULAR; INTRAVENOUS; SUBCUTANEOUS at 13:06

## 2020-07-28 RX ADMIN — MAGNESIUM OXIDE 400 MG ORAL TABLET 800 MILLIGRAM(S): 241.3 TABLET ORAL at 09:20

## 2020-07-28 RX ADMIN — Medication 50 MILLIGRAM(S): at 09:20

## 2020-07-28 NOTE — PROGRESS NOTE ADULT - SUBJECTIVE AND OBJECTIVE BOX
Patient discussed on morning rounds with       Operation / Date:     SUBJECTIVE ASSESSMENT:  75y Female         Vital Signs Last 24 Hrs  T(C): 35.9 (28 Jul 2020 05:01), Max: 36.3 (27 Jul 2020 21:21)  T(F): 96.7 (28 Jul 2020 05:01), Max: 97.3 (27 Jul 2020 21:21)  HR: 94 (28 Jul 2020 09:12) (75 - 96)  BP: 160/118 (28 Jul 2020 09:12) (154/70 - 178/78)  BP(mean): 132 (28 Jul 2020 09:12) (101 - 132)  RR: 19 (28 Jul 2020 09:12) (19 - 24)  SpO2: 99% (28 Jul 2020 09:12) (96% - 99%)  I&O's Detail    27 Jul 2020 07:01  -  28 Jul 2020 07:00  --------------------------------------------------------  IN:    Oral Fluid: 520 mL  Total IN: 520 mL    OUT:    Voided: 2240 mL  Total OUT: 2240 mL    Total NET: -1720 mL      28 Jul 2020 07:01  -  28 Jul 2020 13:00  --------------------------------------------------------  IN:    Oral Fluid: 180 mL  Total IN: 180 mL    OUT:    Voided: 250 mL  Total OUT: 250 mL    Total NET: -70 mL          CHEST TUBE:  Yes/No. AIR LEAKS: Yes/No. Suction / H2O SEAL.   FIDELINA DRAIN:  Yes/No.  EPICARDIAL WIRES: Yes/No.  TIE DOWNS: Yes/No.  JAEGER: Yes/No.    PHYSICAL EXAM:    General:     Neurological:    Cardiovascular:    Respiratory:    Gastrointestinal:    Extremities:    Vascular:    Incision Sites:    LABS:                        9.8    10.11 )-----------( 198      ( 28 Jul 2020 08:07 )             30.6       COUMADIN:  Yes/No. REASON: .    PT/INR - ( 28 Jul 2020 08:07 )   PT: 25.8 sec;   INR: 2.23          PTT - ( 28 Jul 2020 08:07 )  PTT:31.4 sec    07-28    137  |  101  |  9   ----------------------------<  101<H>  3.4<L>   |  27  |  0.49<L>    Ca    8.2<L>      28 Jul 2020 08:07  Phos  2.0     07-27  Mg     1.9     07-28    TPro  5.8<L>  /  Alb  2.9<L>  /  TBili  0.8  /  DBili  x   /  AST  104<H>  /  ALT  170<H>  /  AlkPhos  457<H>  07-28          MEDICATIONS  (STANDING):  amLODIPine   Tablet 5 milliGRAM(s) Oral daily  amLODIPine   Tablet 5 milliGRAM(s) Oral once  apixaban 5 milliGRAM(s) Oral every 12 hours  aspirin enteric coated 81 milliGRAM(s) Oral daily  dextrose 50% Injectable 25 Gram(s) IV Push once  furosemide   Injectable 40 milliGRAM(s) IV Push every 8 hours  insulin glargine Injectable (LANTUS) 32 Unit(s) SubCutaneous at bedtime  insulin lispro (HumaLOG) corrective regimen sliding scale   SubCutaneous Before meals and at bedtime  insulin lispro Injectable (HumaLOG) 7 Unit(s) SubCutaneous three times a day before meals  levoFLOXacin  Tablet 500 milliGRAM(s) Oral every 24 hours  metoprolol tartrate 50 milliGRAM(s) Oral every 8 hours  pantoprazole    Tablet 40 milliGRAM(s) Oral before breakfast  polyethylene glycol 3350 17 Gram(s) Oral daily  potassium chloride    Tablet ER 40 milliEquivalent(s) Oral every 12 hours  senna 2 Tablet(s) Oral at bedtime  sodium chloride 0.9% lock flush 3 milliLiter(s) IV Push every 8 hours    MEDICATIONS  (PRN):  oxyCODONE    IR 5 milliGRAM(s) Oral every 6 hours PRN Moderate Pain (4 - 6)        RADIOLOGY & ADDITIONAL TESTS: Patient discussed on morning rounds with Dr. Tavarez    Operation / Date: 7/22/2020 CABG, x3 (LIMA-LAD, SVG-Ramus, RCA)     SUBJECTIVE ASSESSMENT:  75y Female seen and examined bedside. Patient reports feeling well postoperatively, she states she walked "a few times" yesterday and will walk 2-3x today. Of note, her prosthesis is charging at the bedside. She is using IS pulling 500cc. Her last BM was today. Denies chest pain, SOB, palpitations, N/V/D, abdominal pain, dizziness, fever or chills. Patient is ambulating with her prosthesis, tolerating PO diet, and voiding spontaneously.     Vital Signs Last 24 Hrs  T(C): 35.9 (28 Jul 2020 05:01), Max: 36.3 (27 Jul 2020 21:21)  T(F): 96.7 (28 Jul 2020 05:01), Max: 97.3 (27 Jul 2020 21:21)  HR: 94 (28 Jul 2020 09:12) (75 - 96)  BP: 160/118 (28 Jul 2020 09:12) (154/70 - 178/78)  BP(mean): 132 (28 Jul 2020 09:12) (101 - 132)  RR: 19 (28 Jul 2020 09:12) (19 - 24)  SpO2: 99% (28 Jul 2020 09:12) (96% - 99%)  I&O's Detail    27 Jul 2020 07:01  -  28 Jul 2020 07:00  --------------------------------------------------------  IN:    Oral Fluid: 520 mL  Total IN: 520 mL    OUT:    Voided: 2240 mL  Total OUT: 2240 mL    Total NET: -1720 mL      28 Jul 2020 07:01  -  28 Jul 2020 13:00  --------------------------------------------------------  IN:    Oral Fluid: 180 mL  Total IN: 180 mL    OUT:    Voided: 250 mL  Total OUT: 250 mL    Total NET: -70 mL    CHEST TUBE:  No.   FIDELINA DRAIN:  No.  EPICARDIAL WIRES: No  TIE DOWNS: Yes  JAEGER: No.    PHYSICAL EXAM:    General: Patient lying comfortably in bed, no acute distress     Neurological: Alert and oriented. No focal neurological deficits     Cardiovascular: S1S2, RRR, no murmurs appreciated on exam     Respiratory: Diminished at bilateral bases, otherwise clear to ausculation bilaterally, no wheezes, rales or rhonchi    Gastrointestinal: Abdomen soft, non tender, non distended     Extremities: +L BKA, otherwise warm and well perfused. Trace peripheral edema in RLE, no calf tenderness RLE    Vascular: Peripheral pulses palpable bilaterally    Incision Sites: L VATS sites c/d/i, no signs of infection.     LABS:                        9.8    10.11 )-----------( 198      ( 28 Jul 2020 08:07 )             30.6       COUMADIN:  No.     PT/INR - ( 28 Jul 2020 08:07 )   PT: 25.8 sec;   INR: 2.23          PTT - ( 28 Jul 2020 08:07 )  PTT:31.4 sec    07-28    137  |  101  |  9   ----------------------------<  101<H>  3.4<L>   |  27  |  0.49<L>    Ca    8.2<L>      28 Jul 2020 08:07  Phos  2.0     07-27  Mg     1.9     07-28    TPro  5.8<L>  /  Alb  2.9<L>  /  TBili  0.8  /  DBili  x   /  AST  104<H>  /  ALT  170<H>  /  AlkPhos  457<H>  07-28          MEDICATIONS  (STANDING):  amLODIPine   Tablet 5 milliGRAM(s) Oral daily  amLODIPine   Tablet 5 milliGRAM(s) Oral once  apixaban 5 milliGRAM(s) Oral every 12 hours  aspirin enteric coated 81 milliGRAM(s) Oral daily  dextrose 50% Injectable 25 Gram(s) IV Push once  furosemide   Injectable 40 milliGRAM(s) IV Push every 8 hours  insulin glargine Injectable (LANTUS) 32 Unit(s) SubCutaneous at bedtime  insulin lispro (HumaLOG) corrective regimen sliding scale   SubCutaneous Before meals and at bedtime  insulin lispro Injectable (HumaLOG) 7 Unit(s) SubCutaneous three times a day before meals  levoFLOXacin  Tablet 500 milliGRAM(s) Oral every 24 hours  metoprolol tartrate 50 milliGRAM(s) Oral every 8 hours  pantoprazole    Tablet 40 milliGRAM(s) Oral before breakfast  polyethylene glycol 3350 17 Gram(s) Oral daily  potassium chloride    Tablet ER 40 milliEquivalent(s) Oral every 12 hours  senna 2 Tablet(s) Oral at bedtime  sodium chloride 0.9% lock flush 3 milliLiter(s) IV Push every 8 hours    MEDICATIONS  (PRN):  oxyCODONE    IR 5 milliGRAM(s) Oral every 6 hours PRN Moderate Pain (4 - 6)        RADIOLOGY & ADDITIONAL TESTS:  < from: Xray Chest 1 View- PORTABLE-Routine (07.28.20 @ 05:04) >    EXAM:  XR CHEST PORTABLE ROUTINE 1V                          PROCEDURE DATE:  07/28/2020          INTERPRETATION:  Portable chest    HISTORY: Follow-up abnormal exam    IMPRESSION:    Left mid lung focal atelectasis partially improved since prior exam 7/27/2020. No other definite change. No acute infiltrates appearing. No pneumothorax. Postoperative changes and prominent size heart again noted.      < end of copied text > Patient discussed on morning rounds with Dr. Tavarez    Operation / Date: 7/22/2020 CABG, x3 (LIMA-LAD, SVG-Ramus, RCA)     SUBJECTIVE ASSESSMENT:  75y Female seen and examined bedside. Patient reports feeling well postoperatively, she states she walked "a few times" yesterday and will walk 2-3x today. Of note, her prosthesis is charging at the bedside. She is using IS pulling 500cc. Her last BM was today. Denies chest pain, SOB, palpitations, N/V/D, abdominal pain, dizziness, fever or chills. Patient is ambulating with her prosthesis, tolerating PO diet, and voiding spontaneously.     Vital Signs Last 24 Hrs  T(C): 35.9 (28 Jul 2020 05:01), Max: 36.3 (27 Jul 2020 21:21)  T(F): 96.7 (28 Jul 2020 05:01), Max: 97.3 (27 Jul 2020 21:21)  HR: 94 (28 Jul 2020 09:12) (75 - 96)  BP: 160/118 (28 Jul 2020 09:12) (154/70 - 178/78)  BP(mean): 132 (28 Jul 2020 09:12) (101 - 132)  RR: 19 (28 Jul 2020 09:12) (19 - 24)  SpO2: 99% (28 Jul 2020 09:12) (96% - 99%)  I&O's Detail    27 Jul 2020 07:01  -  28 Jul 2020 07:00  --------------------------------------------------------  IN:    Oral Fluid: 520 mL  Total IN: 520 mL    OUT:    Voided: 2240 mL  Total OUT: 2240 mL    Total NET: -1720 mL      28 Jul 2020 07:01  -  28 Jul 2020 13:00  --------------------------------------------------------  IN:    Oral Fluid: 180 mL  Total IN: 180 mL    OUT:    Voided: 250 mL  Total OUT: 250 mL    Total NET: -70 mL    CHEST TUBE:  No.   FIDELINA DRAIN:  No.  EPICARDIAL WIRES: No  TIE DOWNS: Yes  JAEGER: No.    PHYSICAL EXAM:    General: Patient lying comfortably in bed, no acute distress     Neurological: Alert and oriented. No focal neurological deficits     Cardiovascular: S1S2, RRR, no murmurs appreciated on exam     Respiratory: Diminished at bilateral bases, otherwise clear to ausculation bilaterally, no wheezes, rales or rhonchi    Gastrointestinal: Abdomen soft, non tender, non distended     Extremities: +L BKA, otherwise warm and well perfused. Trace peripheral edema in RLE, no calf tenderness RLE    Vascular: Peripheral pulses palpable bilaterally    Incision Sites: L VATS sites c/d/i, no signs of infection.     LABS:                        9.8    10.11 )-----------( 198      ( 28 Jul 2020 08:07 )             30.6       COUMADIN:  No.     PT/INR - ( 28 Jul 2020 08:07 )   PT: 25.8 sec;   INR: 2.23          PTT - ( 28 Jul 2020 08:07 )  PTT:31.4 sec    07-28    137  |  101  |  9   ----------------------------<  101<H>  3.4<L>   |  27  |  0.49<L>    Ca    8.2<L>      28 Jul 2020 08:07  Phos  2.0     07-27  Mg     1.9     07-28    TPro  5.8<L>  /  Alb  2.9<L>  /  TBili  0.8  /  DBili  x   /  AST  104<H>  /  ALT  170<H>  /  AlkPhos  457<H>  07-28          MEDICATIONS  (STANDING):  amLODIPine   Tablet 5 milliGRAM(s) Oral daily  amLODIPine   Tablet 5 milliGRAM(s) Oral once  apixaban 5 milliGRAM(s) Oral every 12 hours  aspirin enteric coated 81 milliGRAM(s) Oral daily  dextrose 50% Injectable 25 Gram(s) IV Push once  furosemide   Injectable 40 milliGRAM(s) IV Push every 8 hours  insulin glargine Injectable (LANTUS) 32 Unit(s) SubCutaneous at bedtime  insulin lispro (HumaLOG) corrective regimen sliding scale   SubCutaneous Before meals and at bedtime  insulin lispro Injectable (HumaLOG) 7 Unit(s) SubCutaneous three times a day before meals  levoFLOXacin  Tablet 500 milliGRAM(s) Oral every 24 hours  metoprolol tartrate 50 milliGRAM(s) Oral every 8 hours  pantoprazole    Tablet 40 milliGRAM(s) Oral before breakfast  polyethylene glycol 3350 17 Gram(s) Oral daily  potassium chloride    Tablet ER 40 milliEquivalent(s) Oral every 12 hours  senna 2 Tablet(s) Oral at bedtime  sodium chloride 0.9% lock flush 3 milliLiter(s) IV Push every 8 hours    MEDICATIONS  (PRN):  oxyCODONE    IR 5 milliGRAM(s) Oral every 6 hours PRN Moderate Pain (4 - 6)        RADIOLOGY & ADDITIONAL TESTS:  < from: Xray Chest 1 View- PORTABLE-Routine (07.28.20 @ 05:04) >    EXAM:  XR CHEST PORTABLE ROUTINE 1V                          PROCEDURE DATE:  07/28/2020          INTERPRETATION:  Portable chest    HISTORY: Follow-up abnormal exam    IMPRESSION:    Left mid lung focal atelectasis partially improved since prior exam 7/27/2020. No other definite change. No acute infiltrates appearing. No pneumothorax. Postoperative changes and prominent size heart again noted.      < end of copied text >    < from: US Abdomen Limited (07.25.20 @ 13:36) >    IMPRESSION:  1.  Mild common duct dilatation, 0.9 cm with mild gallbladder wall thickening and trace pericholecystic fluid however negative sonographic Price's sign. Findings are equivocal for acute cholecystitis.HIDA scan may be obtained for further evaluation.  2.  Small right pleural effusion.        < end of copied text > Patient discussed on morning rounds with Dr. Tavarez    Operation / Date: 7/22/2020 CABG, x3 (LIMA-LAD, SVG-Ramus, RCA)     SUBJECTIVE ASSESSMENT:  75y Female seen and examined bedside. Patient reports feeling well postoperatively, she states she walked "a few times" yesterday and will walk 2-3x today. Of note, her prosthesis is charging at the bedside. She is using IS pulling 500cc. Her last BM was today. Denies chest pain, SOB, palpitations, N/V/D, abdominal pain, dizziness, fever or chills. Patient is ambulating with her prosthesis, tolerating PO diet, and voiding spontaneously.     Vital Signs Last 24 Hrs  T(C): 35.9 (28 Jul 2020 05:01), Max: 36.3 (27 Jul 2020 21:21)  T(F): 96.7 (28 Jul 2020 05:01), Max: 97.3 (27 Jul 2020 21:21)  HR: 94 (28 Jul 2020 09:12) (75 - 96)  BP: 160/118 (28 Jul 2020 09:12) (154/70 - 178/78)  BP(mean): 132 (28 Jul 2020 09:12) (101 - 132)  RR: 19 (28 Jul 2020 09:12) (19 - 24)  SpO2: 99% (28 Jul 2020 09:12) (96% - 99%)  I&O's Detail    27 Jul 2020 07:01  -  28 Jul 2020 07:00  --------------------------------------------------------  IN:    Oral Fluid: 520 mL  Total IN: 520 mL    OUT:    Voided: 2240 mL  Total OUT: 2240 mL    Total NET: -1720 mL      28 Jul 2020 07:01  -  28 Jul 2020 13:00  --------------------------------------------------------  IN:    Oral Fluid: 180 mL  Total IN: 180 mL    OUT:    Voided: 250 mL  Total OUT: 250 mL    Total NET: -70 mL    CHEST TUBE:  No.   FIDELINA DRAIN:  No.  EPICARDIAL WIRES: No  TIE DOWNS: Yes  JAEGER: No.    PHYSICAL EXAM:    General: Patient lying comfortably in bed, no acute distress     Neurological: Alert and oriented. No focal neurological deficits     Cardiovascular: S1S2, RRR, no murmurs appreciated on exam     Respiratory: Diminished at bilateral bases, otherwise clear to ausculation bilaterally, no wheezes, rales or rhonchi    Gastrointestinal: Abdomen soft, non tender, non distended     Extremities: +L BKA, otherwise warm and well perfused. Trace peripheral edema in RLE, no calf tenderness RLE    Vascular: Peripheral pulses palpable bilaterally    Incision Sites: MSI c/d/i, no sternal click.     LABS:                        9.8    10.11 )-----------( 198      ( 28 Jul 2020 08:07 )             30.6       COUMADIN:  No.     PT/INR - ( 28 Jul 2020 08:07 )   PT: 25.8 sec;   INR: 2.23          PTT - ( 28 Jul 2020 08:07 )  PTT:31.4 sec    07-28    137  |  101  |  9   ----------------------------<  101<H>  3.4<L>   |  27  |  0.49<L>    Ca    8.2<L>      28 Jul 2020 08:07  Phos  2.0     07-27  Mg     1.9     07-28    TPro  5.8<L>  /  Alb  2.9<L>  /  TBili  0.8  /  DBili  x   /  AST  104<H>  /  ALT  170<H>  /  AlkPhos  457<H>  07-28          MEDICATIONS  (STANDING):  amLODIPine   Tablet 5 milliGRAM(s) Oral daily  amLODIPine   Tablet 5 milliGRAM(s) Oral once  apixaban 5 milliGRAM(s) Oral every 12 hours  aspirin enteric coated 81 milliGRAM(s) Oral daily  dextrose 50% Injectable 25 Gram(s) IV Push once  furosemide   Injectable 40 milliGRAM(s) IV Push every 8 hours  insulin glargine Injectable (LANTUS) 32 Unit(s) SubCutaneous at bedtime  insulin lispro (HumaLOG) corrective regimen sliding scale   SubCutaneous Before meals and at bedtime  insulin lispro Injectable (HumaLOG) 7 Unit(s) SubCutaneous three times a day before meals  levoFLOXacin  Tablet 500 milliGRAM(s) Oral every 24 hours  metoprolol tartrate 50 milliGRAM(s) Oral every 8 hours  pantoprazole    Tablet 40 milliGRAM(s) Oral before breakfast  polyethylene glycol 3350 17 Gram(s) Oral daily  potassium chloride    Tablet ER 40 milliEquivalent(s) Oral every 12 hours  senna 2 Tablet(s) Oral at bedtime  sodium chloride 0.9% lock flush 3 milliLiter(s) IV Push every 8 hours    MEDICATIONS  (PRN):  oxyCODONE    IR 5 milliGRAM(s) Oral every 6 hours PRN Moderate Pain (4 - 6)        RADIOLOGY & ADDITIONAL TESTS:  < from: Xray Chest 1 View- PORTABLE-Routine (07.28.20 @ 05:04) >    EXAM:  XR CHEST PORTABLE ROUTINE 1V                          PROCEDURE DATE:  07/28/2020          INTERPRETATION:  Portable chest    HISTORY: Follow-up abnormal exam    IMPRESSION:    Left mid lung focal atelectasis partially improved since prior exam 7/27/2020. No other definite change. No acute infiltrates appearing. No pneumothorax. Postoperative changes and prominent size heart again noted.      < end of copied text >    < from: US Abdomen Limited (07.25.20 @ 13:36) >    IMPRESSION:  1.  Mild common duct dilatation, 0.9 cm with mild gallbladder wall thickening and trace pericholecystic fluid however negative sonographic Price's sign. Findings are equivocal for acute cholecystitis.HIDA scan may be obtained for further evaluation.  2.  Small right pleural effusion.        < end of copied text >

## 2020-07-28 NOTE — PROGRESS NOTE ADULT - ASSESSMENT
74 y/o female with PMHx of 3VCAD (diagnostic cath 8/2019 @ [facility]), HTN, HLD, Uncontrolled Insulin Dependent DM II (Hgb A1C 11.8% on 7/17/2020), PAD s/p LSFA/Popliteal/TP trunk and Posterior Tibial PTA and stent 8/2019, (s/p peripheral angiogram @ [facility] 5/2020 with "ballooning and stenting" per patient- on Eliquis), Osteomyelitis s/p AKA LLE 11/2019 who presented to cardiologist Dr. Yepez for follow-up. Patient reported RIBEIRO and was brought in for cardiac cath on 7/17/2020 revealing severe 3VCAD, EF 65%. Cardiothoracic Surgery, Dr. Tavarez, consulted for evaluation for CABG and patient came back for elective surgery.  On 7/22/2020 patient underwent CABGx3, EF normal.  In the OR received 2 units PRBC.  Paced for underlying asystole.  Arrived to [facility]ICU on levophed/Vasopressin.  Extubated POD #1.  POD #2 Micra placed with EPS.  POD #3 LFTs elevated, RUQ U/S done with GB thickening, so patient started on Levaquin.  POD #4 diuresed. POD #5 transferred to stepdown unit, lasix increased with goal net negative 2L /day. Holding statin due to transaminitis. Dispo planning, likely will go to rehab.     A/P:  Neurovascular: No delirium. Pain well controlled with current regimen.  - Oxycodone PRN for pain.   - Holding tylenol 2/2 transaminitis.   - Can add lidoderm patch if needed.     Cardiovascular: CAD s/p CABG, now POD6  - Postop course significant for dysrhythmia EPS consulted, and patient is now s/p Micra PPM, now POD5.  - Continue Aspirin 81mg PO qd, and metoprolol 50mg PO TID. Holding Statin 2/2 transaminitis.   - On eliquis for ?PAD. Will confirm with Dr. Tavarez.   - Continue to monitor HR/BP/Tele.     Respiratory: 02 Sat = 98% on RA.  -If on oxygen wean to RA from for O2 Sat > 93%.  -Encourage C+DB and Use of IS 10x / hr while awake.  -CXR bibasilar atx vs effusion. Saturating well on room air.   - educated on IS use. Will ambulate today with prosthesis, now charged and at bedside.   - Physical therapy recommending rehab.     GI: Stable.  -NPO after MN.  -PPX.  -PO Diet.    Renal / :  -Monitor renal function.  -Monitor I/O's.    Endocrine:    -A1c.  -TSH.    Hematologic:  -CBC.  -Coagulation Panel.    ID:  -Tempature.  -CBC.  -Observe for SIRS/Sepsis Syndrome.    Prophylaxis:  -DVT prophylaxis with 5000 SubQ Heparin q8h.  -SCD's    Disposition:  -ICU for frequent monitoring. 74 y/o female with PMHx of 3VCAD (diagnostic cath 8/2019 @ [place]), HTN, HLD, Uncontrolled Insulin Dependent DM II (Hgb A1C 11.8% on 7/17/2020), PAD s/p LSFA/Popliteal/TP trunk and Posterior Tibial PTA and stent 8/2019, (s/p peripheral angiogram @ [place] 5/2020 with "ballooning and stenting" per patient- on Eliquis), Osteomyelitis s/p AKA LLE 11/2019 who presented to cardiologist Dr. Yepez for follow-up. Patient reported RIBEIRO and was brought in for cardiac cath on 7/17/2020 revealing severe 3VCAD, EF 65%. Cardiothoracic Surgery, Dr. Tavarez, consulted for evaluation for CABG and patient came back for elective surgery.  On 7/22/2020 patient underwent CABGx3, EF normal.  In the OR received 2 units PRBC.  Paced for underlying asystole.  Arrived to CTICU on levophed/Vasopressin.  Extubated POD #1.  POD #2 Micra placed with EPS.  POD #3 LFTs elevated, RUQ U/S done with GB thickening, so patient started on Levaquin.  POD #4 diuresed. POD #5 transferred to stepdown unit, lasix increased with goal net negative 2L /day. Holding statin due to transaminitis. Dispo planning, likely will go to rehab.     A/P:  Neurovascular: No delirium. Pain well controlled with current regimen.  - Oxycodone PRN for pain.   - Holding tylenol 2/2 transaminitis.   - Can add lidoderm patch if needed.     Cardiovascular: CAD s/p CABG, now POD6  - Postop course significant for dysrhythmia EPS consulted, and patient is now s/p Micra PPM, now POD5.  - Continue Aspirin 81mg PO qd, and metoprolol 50mg PO TID. Holding Statin 2/2 transaminitis.   - On eliquis for ?PAD. Will confirm with Dr. Tavarez.   - Continue to monitor HR/BP/Tele.     Respiratory: 02 Sat = 98% on RA.  -If on oxygen wean to RA from for O2 Sat > 93%.  -Encourage C+DB and Use of IS 10x / hr while awake.  -CXR bibasilar atx vs effusion. Saturating well on room air.   - educated on IS use. Will ambulate today with prosthesis, now charged and at bedside.   - Physical therapy recommending rehab.     GI: Transaminitis, improving  - Hold hepatotoxic agents  - RUQ US revealed GB thickening, continue Levaquin   - neg murphys sign, no abdominal complaints. +BM today  -PPX with pantoprazole.  -PO Diet.    Renal / : BUN/Cr: 9/0.49   - continue goal net negative daily  -Monitor renal function.  -Monitor I/O's.    Endocrine:    -A1c: 11.8  -TSH: 2.238  - Continue insulin regimen, adjust daily as indicated, with blood glucose goal <150  - Endocrine consulted, appreciate recs    Hematologic: H&H 9.8/30.6  - CBC in AM  - continue Eliquis    ID: continue levaquin for GB thickening and increased alk phos  - afebrile.   - WBC 10.11  -Observe for SIRS/Sepsis Syndrome.    Prophylaxis:  -DVT prophylaxis with eliquis  -SCD's    Disposition:  - rehab

## 2020-07-28 NOTE — PROGRESS NOTE ADULT - SUBJECTIVE AND OBJECTIVE BOX
INTERVAL HPI/OVERNIGHT EVENTS:    Patient is a 75y old  Female who presents with a chief complaint of CAD (27 Jul 2020 09:31)  pt was seen and examined at the bedside. S/P CABG. Moderate appetite. Not drinking glucerna.  Had symptomatic low glucose overnight to 69.    FSG & insulin:  7/27:  Dinner    Lispro 7+4   Ate salmon, swt potato, vegetable.  Bedtime    Lantus 32   lispro 2    7/28:  3AM FSG 69. Had tea with sugar and 2 cristy crackers. Repeat 168  Breakfast    Lispro-none  Ate  oatmeal and tea.    Lunch FSG  144   Lispro 7+0    Pt reports the following symptoms:    CONSTITUTIONAL:  Negative fever or chills, feels well, good appetite  EYES:  Negative  blurry vision or double vision  CARDIOVASCULAR:  Negative for chest pain or palpitations  RESPIRATORY:  Negative for cough, wheezing, or SOB   GASTROINTESTINAL:  Negative for nausea, vomiting, diarrhea, constipation, or abdominal pain  GENITOURINARY:  Negative frequency, urgency or dysuria  NEUROLOGIC:  No headache, confusion, dizziness, lightheadedness    MEDICATIONS  (STANDING):  apixaban 5 milliGRAM(s) Oral every 12 hours  aspirin enteric coated 81 milliGRAM(s) Oral daily  dextrose 50% Injectable 25 Gram(s) IV Push once  furosemide   Injectable 40 milliGRAM(s) IV Push every 8 hours  insulin glargine Injectable (LANTUS) 24 Unit(s) SubCutaneous at bedtime  insulin lispro (HumaLOG) corrective regimen sliding scale   SubCutaneous Before meals and at bedtime  insulin lispro Injectable (HumaLOG) 7 Unit(s) SubCutaneous three times a day before meals  levoFLOXacin  Tablet 500 milliGRAM(s) Oral every 24 hours  metoprolol tartrate 50 milliGRAM(s) Oral every 8 hours  pantoprazole    Tablet 40 milliGRAM(s) Oral before breakfast  polyethylene glycol 3350 17 Gram(s) Oral daily  potassium chloride    Tablet ER 40 milliEquivalent(s) Oral every 12 hours  senna 2 Tablet(s) Oral at bedtime  sodium chloride 0.9% lock flush 3 milliLiter(s) IV Push every 8 hours    MEDICATIONS  (PRN):  oxyCODONE    IR 5 milliGRAM(s) Oral every 6 hours PRN Moderate Pain (4 - 6)      PHYSICAL EXAM  Vital Signs Last 24 Hrs  T(C): 36.1 (28 Jul 2020 17:07), Max: 36.3 (27 Jul 2020 21:21)  T(F): 97 (28 Jul 2020 17:07), Max: 97.3 (27 Jul 2020 21:21)  HR: 78 (28 Jul 2020 12:55) (75 - 94)  BP: 160/74 (28 Jul 2020 12:55) (154/70 - 178/78)  BP(mean): 106 (28 Jul 2020 12:55) (101 - 132)  RR: 18 (28 Jul 2020 12:55) (18 - 22)  SpO2: 98% (28 Jul 2020 12:55) (96% - 99%)    Constitutional: wn/wd in NAD.   HEENT: NCAT, MMM, OP clear, EOMI, no proptosis or lid retraction  Neck: no thyromegaly or palpable thyroid nodules   Respiratory: lungs CTAB.  Cardiovascular: regular rhythm, normal S1 and S2, no audible murmurs, no peripheral edema  GI: soft, NT/ND, no masses/HSM appreciated.  Neurology: no tremors, DTR 2+  Skin: no visible rashes/lesions. no acanthosis nigricans. no lipohypertrophy. no cushing's stigmata.  Psychiatric: AAO x 3, normal affect/mood.    LABS:                        9.8    10.11 )-----------( 198      ( 28 Jul 2020 08:07 )             30.6     07-28    137  |  101  |  9   ----------------------------<  101<H>  3.4<L>   |  27  |  0.49<L>    Ca    8.2<L>      28 Jul 2020 08:07  Phos  2.0     07-27  Mg     1.9     07-28    TPro  5.8<L>  /  Alb  2.9<L>  /  TBili  0.8  /  DBili  x   /  AST  104<H>  /  ALT  170<H>  /  AlkPhos  457<H>  07-28    PT/INR - ( 28 Jul 2020 08:07 )   PT: 25.8 sec;   INR: 2.23          PTT - ( 28 Jul 2020 08:07 )  PTT:31.4 sec    Thyroid Stimulating Hormone, Serum: 2.238 uIU/mL (07-17 @ 09:58)      HbA1C:   CAPILLARY BLOOD GLUCOSE      POCT Blood Glucose.: 309 mg/dL (28 Jul 2020 16:51)  POCT Blood Glucose.: 142 mg/dL (28 Jul 2020 12:47)  POCT Blood Glucose.: 144 mg/dL (28 Jul 2020 11:11)  POCT Blood Glucose.: 104 mg/dL (28 Jul 2020 06:58)  POCT Blood Glucose.: 168 mg/dL (28 Jul 2020 04:00)  POCT Blood Glucose.: 69 mg/dL (28 Jul 2020 02:59)  POCT Blood Glucose.: 159 mg/dL (27 Jul 2020 21:13)    A/P:75y Female with PMHx known 3VCAD (diagnostic cath 8/2019 @ [place]), HTN, HLD, Uncontrolled Insulin Dependent DM II (Hgb A1C 11.8% on 7/17/2020), PAD s/p LSFA/Popliteal/TP trunk and Posterior Tibial PTA and stent 8/2019  (s/p peripheral angiogram @ [facility] 5/2020 with "ballooning and stenting" per patient- on Eliquis, Osteomyelitis s/p AKA LLE 11/2019 who presented to cardiologist Dr. Yepez for follow-up.  s/p CABG on 7/22/2020. s/p insulin drip. Undergoing endocrine consult for DM management.    1.  DM - type 2, uncontrolled, complicated with hyperglycemia  Hgb A1C 11.8  Please decrease lantus to 24 units at night.   Please c/w lispro   7   units before each meal  Please continue lispro moderate sliding scale four times daily with meals and at bedtime    Pt's fingerstick glucose goal is 80- 180    Will continue to monitor     For discharge, pt can continue    Pt can follow up at discharge with [facility] by calling  to make an appointment.    discussed case with Dr. stratton  and update primary team

## 2020-07-29 ENCOUNTER — APPOINTMENT (OUTPATIENT)
Dept: ENDOCRINOLOGY | Facility: CLINIC | Age: 75
End: 2020-07-29

## 2020-07-29 LAB
ALBUMIN SERPL ELPH-MCNC: 3.3 G/DL — SIGNIFICANT CHANGE UP (ref 3.3–5)
ALP SERPL-CCNC: 418 U/L — HIGH (ref 40–120)
ALT FLD-CCNC: 135 U/L — HIGH (ref 10–45)
ANION GAP SERPL CALC-SCNC: 12 MMOL/L — SIGNIFICANT CHANGE UP (ref 5–17)
APTT BLD: 29.1 SEC — SIGNIFICANT CHANGE UP (ref 27.5–35.5)
AST SERPL-CCNC: 56 U/L — HIGH (ref 10–40)
BILIRUB SERPL-MCNC: 0.8 MG/DL — SIGNIFICANT CHANGE UP (ref 0.2–1.2)
BUN SERPL-MCNC: 9 MG/DL — SIGNIFICANT CHANGE UP (ref 7–23)
CALCIUM SERPL-MCNC: 9 MG/DL — SIGNIFICANT CHANGE UP (ref 8.4–10.5)
CHLORIDE SERPL-SCNC: 100 MMOL/L — SIGNIFICANT CHANGE UP (ref 96–108)
CO2 SERPL-SCNC: 27 MMOL/L — SIGNIFICANT CHANGE UP (ref 22–31)
CREAT SERPL-MCNC: 0.52 MG/DL — SIGNIFICANT CHANGE UP (ref 0.5–1.3)
GLUCOSE BLDC GLUCOMTR-MCNC: 133 MG/DL — HIGH (ref 70–99)
GLUCOSE BLDC GLUCOMTR-MCNC: 140 MG/DL — HIGH (ref 70–99)
GLUCOSE BLDC GLUCOMTR-MCNC: 144 MG/DL — HIGH (ref 70–99)
GLUCOSE BLDC GLUCOMTR-MCNC: 218 MG/DL — HIGH (ref 70–99)
GLUCOSE BLDC GLUCOMTR-MCNC: 280 MG/DL — HIGH (ref 70–99)
GLUCOSE SERPL-MCNC: 147 MG/DL — HIGH (ref 70–99)
HCT VFR BLD CALC: 32.7 % — LOW (ref 34.5–45)
HGB BLD-MCNC: 10.4 G/DL — LOW (ref 11.5–15.5)
INR BLD: 1.8 — HIGH (ref 0.88–1.16)
MAGNESIUM SERPL-MCNC: 1.8 MG/DL — SIGNIFICANT CHANGE UP (ref 1.6–2.6)
MCHC RBC-ENTMCNC: 27.8 PG — SIGNIFICANT CHANGE UP (ref 27–34)
MCHC RBC-ENTMCNC: 31.8 GM/DL — LOW (ref 32–36)
MCV RBC AUTO: 87.4 FL — SIGNIFICANT CHANGE UP (ref 80–100)
NRBC # BLD: 0 /100 WBCS — SIGNIFICANT CHANGE UP (ref 0–0)
PLATELET # BLD AUTO: 267 K/UL — SIGNIFICANT CHANGE UP (ref 150–400)
POTASSIUM SERPL-MCNC: 3.9 MMOL/L — SIGNIFICANT CHANGE UP (ref 3.5–5.3)
POTASSIUM SERPL-SCNC: 3.9 MMOL/L — SIGNIFICANT CHANGE UP (ref 3.5–5.3)
PROT SERPL-MCNC: 6.3 G/DL — SIGNIFICANT CHANGE UP (ref 6–8.3)
PROTHROM AB SERPL-ACNC: 21 SEC — HIGH (ref 10.6–13.6)
RBC # BLD: 3.74 M/UL — LOW (ref 3.8–5.2)
RBC # FLD: 15.9 % — HIGH (ref 10.3–14.5)
SARS-COV-2 RNA SPEC QL NAA+PROBE: SIGNIFICANT CHANGE UP
SODIUM SERPL-SCNC: 139 MMOL/L — SIGNIFICANT CHANGE UP (ref 135–145)
WBC # BLD: 9.78 K/UL — SIGNIFICANT CHANGE UP (ref 3.8–10.5)
WBC # FLD AUTO: 9.78 K/UL — SIGNIFICANT CHANGE UP (ref 3.8–10.5)

## 2020-07-29 PROCEDURE — 99231 SBSQ HOSP IP/OBS SF/LOW 25: CPT | Mod: GC

## 2020-07-29 PROCEDURE — 71046 X-RAY EXAM CHEST 2 VIEWS: CPT | Mod: 26

## 2020-07-29 RX ORDER — INSULIN LISPRO 100/ML
10 VIAL (ML) SUBCUTANEOUS
Refills: 0 | Status: DISCONTINUED | OUTPATIENT
Start: 2020-07-29 | End: 2020-07-30

## 2020-07-29 RX ORDER — MAGNESIUM OXIDE 400 MG ORAL TABLET 241.3 MG
800 TABLET ORAL ONCE
Refills: 0 | Status: COMPLETED | OUTPATIENT
Start: 2020-07-29 | End: 2020-07-29

## 2020-07-29 RX ADMIN — Medication 4: at 21:10

## 2020-07-29 RX ADMIN — Medication 40 MILLIEQUIVALENT(S): at 08:51

## 2020-07-29 RX ADMIN — APIXABAN 5 MILLIGRAM(S): 2.5 TABLET, FILM COATED ORAL at 17:26

## 2020-07-29 RX ADMIN — SODIUM CHLORIDE 3 MILLILITER(S): 9 INJECTION INTRAMUSCULAR; INTRAVENOUS; SUBCUTANEOUS at 13:16

## 2020-07-29 RX ADMIN — Medication 6: at 12:40

## 2020-07-29 RX ADMIN — SENNA PLUS 2 TABLET(S): 8.6 TABLET ORAL at 21:11

## 2020-07-29 RX ADMIN — SODIUM CHLORIDE 3 MILLILITER(S): 9 INJECTION INTRAMUSCULAR; INTRAVENOUS; SUBCUTANEOUS at 21:11

## 2020-07-29 RX ADMIN — Medication 81 MILLIGRAM(S): at 12:03

## 2020-07-29 RX ADMIN — MAGNESIUM OXIDE 400 MG ORAL TABLET 800 MILLIGRAM(S): 241.3 TABLET ORAL at 08:51

## 2020-07-29 RX ADMIN — Medication 7 UNIT(S): at 07:26

## 2020-07-29 RX ADMIN — Medication 50 MILLIGRAM(S): at 03:00

## 2020-07-29 RX ADMIN — Medication 40 MILLIGRAM(S): at 05:47

## 2020-07-29 RX ADMIN — Medication 50 MILLIGRAM(S): at 12:03

## 2020-07-29 RX ADMIN — Medication 40 MILLIGRAM(S): at 21:11

## 2020-07-29 RX ADMIN — SODIUM CHLORIDE 3 MILLILITER(S): 9 INJECTION INTRAMUSCULAR; INTRAVENOUS; SUBCUTANEOUS at 05:45

## 2020-07-29 RX ADMIN — Medication 40 MILLIGRAM(S): at 13:15

## 2020-07-29 RX ADMIN — Medication 7 UNIT(S): at 12:40

## 2020-07-29 RX ADMIN — Medication 50 MILLIGRAM(S): at 19:21

## 2020-07-29 RX ADMIN — PANTOPRAZOLE SODIUM 40 MILLIGRAM(S): 20 TABLET, DELAYED RELEASE ORAL at 05:58

## 2020-07-29 RX ADMIN — AMLODIPINE BESYLATE 10 MILLIGRAM(S): 2.5 TABLET ORAL at 05:57

## 2020-07-29 RX ADMIN — APIXABAN 5 MILLIGRAM(S): 2.5 TABLET, FILM COATED ORAL at 05:47

## 2020-07-29 RX ADMIN — Medication 40 MILLIEQUIVALENT(S): at 21:11

## 2020-07-29 RX ADMIN — INSULIN GLARGINE 24 UNIT(S): 100 INJECTION, SOLUTION SUBCUTANEOUS at 21:30

## 2020-07-29 RX ADMIN — Medication 10 UNIT(S): at 17:11

## 2020-07-29 NOTE — OCCUPATIONAL THERAPY INITIAL EVALUATION ADULT - PERTINENT HX OF CURRENT PROBLEM, REHAB EVAL
76 yo F POOR HISTORIAN with PMHx known 3VCAD (diagnostic cath 8/2019 @ [place]), HTN, HLD, Uncontrolled Insulin Dependent DM II (Hgb A1C 11.8% on 7/17/2020), PAD s/p LSFA/Popliteal/TP trunk and Posterior Tibial PTA and stent 8/2019  (s/p peripheral angiogram @ [place] 5/2020 with "ballooning and stenting" per patient- on Eliquis, Osteomyelitis s/p AKA LLE 11/2019

## 2020-07-29 NOTE — OCCUPATIONAL THERAPY INITIAL EVALUATION ADULT - LEVEL OF INDEPENDENCE: DRESS LOWER BODY, OT EVAL
moderate assist (50% patients effort)/to don prosthesis to LLE, Max A to don sneaker and socks to RLE

## 2020-07-29 NOTE — CHART NOTE - NSCHARTNOTEFT_GEN_A_CORE
Admitting Diagnosis:   Patient is a 75y old  Female who presents with a chief complaint of CAD (28 Jul 2020 17:54)      PAST MEDICAL & SURGICAL HISTORY:  History of osteomyelitis  CAD (coronary artery disease)  PAD (peripheral artery disease)  Diabetes mellitus  HLD (hyperlipidemia)  HTN (hypertension)      Current Nutrition Order: Cst Cho no Snacks + Glucerna Shakes TID (660 kcal, 30g protein, 600mL H2O)       PO Intake: Good (%) [   ]  Fair (50-75%) [ x  ] Poor (<25%) [   ]    GI Issues: no noted n/v/d/c    Pain: no pain noted     Skin Integrity: surgical incision, MSI/ EVH, claire score 17     Labs:   07-29    139  |  100  |  9   ----------------------------<  147<H>  3.9   |  27  |  0.52    Ca    9.0      29 Jul 2020 07:06  Mg     1.8     07-29    TPro  6.3  /  Alb  3.3  /  TBili  0.8  /  DBili  x   /  AST  56<H>  /  ALT  135<H>  /  AlkPhos  418<H>  07-29    CAPILLARY BLOOD GLUCOSE      POCT Blood Glucose.: 280 mg/dL (29 Jul 2020 12:33)  POCT Blood Glucose.: 133 mg/dL (29 Jul 2020 09:02)  POCT Blood Glucose.: 144 mg/dL (29 Jul 2020 06:59)  POCT Blood Glucose.: 133 mg/dL (28 Jul 2020 21:09)  POCT Blood Glucose.: 309 mg/dL (28 Jul 2020 16:51)      Medications:  MEDICATIONS  (STANDING):  amLODIPine   Tablet 10 milliGRAM(s) Oral daily  apixaban 5 milliGRAM(s) Oral every 12 hours  aspirin enteric coated 81 milliGRAM(s) Oral daily  dextrose 50% Injectable 25 Gram(s) IV Push once  furosemide   Injectable 40 milliGRAM(s) IV Push every 8 hours  insulin glargine Injectable (LANTUS) 24 Unit(s) SubCutaneous at bedtime  insulin lispro (HumaLOG) corrective regimen sliding scale   SubCutaneous Before meals and at bedtime  insulin lispro Injectable (HumaLOG) 7 Unit(s) SubCutaneous three times a day before meals  levoFLOXacin  Tablet 500 milliGRAM(s) Oral every 24 hours  metoprolol tartrate 50 milliGRAM(s) Oral every 8 hours  pantoprazole    Tablet 40 milliGRAM(s) Oral before breakfast  polyethylene glycol 3350 17 Gram(s) Oral daily  potassium chloride    Tablet ER 40 milliEquivalent(s) Oral every 12 hours  senna 2 Tablet(s) Oral at bedtime  sodium chloride 0.9% lock flush 3 milliLiter(s) IV Push every 8 hours    MEDICATIONS  (PRN):  oxyCODONE    IR 5 milliGRAM(s) Oral every 6 hours PRN Moderate Pain (4 - 6)      Weight: 81kg     Weight Change: none     Nutrition Focused Physical Exam: Completed [   ]  Not Pertinent [ x  ]    Estimated energy needs:   Adj BW used as pt w AKA, Adj bw = 49kg  ABW 81kg, IBW 52kg, 155% IBW, ht 63", BMI 31.6   Nutrient needs based on [facility] standards of care for maintenance in adults, adjusted for post-op demand, fluid per team  1225-1470kcal (25-30kcal/kg)   58-68g pro (1.2-1.4g/kg pro)     Subjective:   75F POOR HISTORIAN with PMHx known 3VCAD, HTN, HLD, Uncontrolled Insulin Dependent DM II (Hgb A1C 11.3% on 7/17/2020), PAD, Osteomyelitis s/p AKA LLE 11/2019 who presented to cardiologist for follow-up. Patient reports SOB after climbing 1-2 flights of stairs resolved with rest. She denies chest pain, palpitations, dizziness, syncope, orthopnea, PND. Does however report RLE dependent edema. In light of pt's risk factors, known 3VCAD, CCS Angina Class III Equivalent Symptoms, she is now sp cardiac cath 7/17/20, revealing severe 3VCAD. Presents for elective surgery. On 7/22 underwent CABG w [facility], 7/24 went for PPM placement. POD 5 transferred to SDU. Plans remain for DC to [facility] once stable.     No recorded n/v/d/c, skin with surgical incision to midline, claire score 13, no chewing/ swallowing issues noted. NKFA. Unsure of UBW. NPO at this time for pacemaker placement. Does not follow dietary guidelines at home, uses insulin to manage BS. Discussed importance of maintaining BS for post-op healing. Recommend c/w cst cho diet upon advancement from NPO. Will continue to follow per protocol.    Previous Nutrition Diagnosis: Increased nutrient needs r/t post-op demand AEB hypermetabolic state     Active [ x  ]  Resolved [   ]    If resolved, new PES:     Goal: meet >75% EER     Recommendations:  1. Reinforce DM ed   2. Manage pain prn   3. Trend wts   4. Encourage intake through day   5. Monitor and replete lytes   6. Check POC q6hrs    Education: reinforced cst cho diet, eating consistently through day     Risk Level: High [   ] Moderate [  x ] Low [   ] Admitting Diagnosis:   Patient is a 75y old  Female who presents with a chief complaint of CAD (28 Jul 2020 17:54)      PAST MEDICAL & SURGICAL HISTORY:  History of osteomyelitis  CAD (coronary artery disease)  PAD (peripheral artery disease)  Diabetes mellitus  HLD (hyperlipidemia)  HTN (hypertension)      Current Nutrition Order: Cst Cho no Snacks + Glucerna Shakes TID (660 kcal, 30g protein, 600mL H2O)       PO Intake: Good (%) [   ]  Fair (50-75%) [ x  ] Poor (<25%) [   ]    GI Issues: no noted n/v/d/c    Pain: no pain noted     Skin Integrity: surgical incision, MSI/ EVH, claire score 17     Labs:   07-29    139  |  100  |  9   ----------------------------<  147<H>  3.9   |  27  |  0.52    Ca    9.0      29 Jul 2020 07:06  Mg     1.8     07-29    TPro  6.3  /  Alb  3.3  /  TBili  0.8  /  DBili  x   /  AST  56<H>  /  ALT  135<H>  /  AlkPhos  418<H>  07-29    CAPILLARY BLOOD GLUCOSE      POCT Blood Glucose.: 280 mg/dL (29 Jul 2020 12:33)  POCT Blood Glucose.: 133 mg/dL (29 Jul 2020 09:02)  POCT Blood Glucose.: 144 mg/dL (29 Jul 2020 06:59)  POCT Blood Glucose.: 133 mg/dL (28 Jul 2020 21:09)  POCT Blood Glucose.: 309 mg/dL (28 Jul 2020 16:51)      Medications:  MEDICATIONS  (STANDING):  amLODIPine   Tablet 10 milliGRAM(s) Oral daily  apixaban 5 milliGRAM(s) Oral every 12 hours  aspirin enteric coated 81 milliGRAM(s) Oral daily  dextrose 50% Injectable 25 Gram(s) IV Push once  furosemide   Injectable 40 milliGRAM(s) IV Push every 8 hours  insulin glargine Injectable (LANTUS) 24 Unit(s) SubCutaneous at bedtime  insulin lispro (HumaLOG) corrective regimen sliding scale   SubCutaneous Before meals and at bedtime  insulin lispro Injectable (HumaLOG) 7 Unit(s) SubCutaneous three times a day before meals  levoFLOXacin  Tablet 500 milliGRAM(s) Oral every 24 hours  metoprolol tartrate 50 milliGRAM(s) Oral every 8 hours  pantoprazole    Tablet 40 milliGRAM(s) Oral before breakfast  polyethylene glycol 3350 17 Gram(s) Oral daily  potassium chloride    Tablet ER 40 milliEquivalent(s) Oral every 12 hours  senna 2 Tablet(s) Oral at bedtime  sodium chloride 0.9% lock flush 3 milliLiter(s) IV Push every 8 hours    MEDICATIONS  (PRN):  oxyCODONE    IR 5 milliGRAM(s) Oral every 6 hours PRN Moderate Pain (4 - 6)      Weight: 81kg     Weight Change: none     Nutrition Focused Physical Exam: Completed [   ]  Not Pertinent [ x  ]    Estimated energy needs:   Adj BW used as pt w AKA, Adj bw = 49kg  ABW 81kg, IBW 52kg, 155% IBW, ht 63", BMI 31.6   Nutrient needs based on [facility] standards of care for maintenance in adults, adjusted for post-op demand, fluid per team  1225-1470kcal (25-30kcal/kg)   58-68g pro (1.2-1.4g/kg pro)     Subjective:   75F POOR HISTORIAN with PMHx known 3VCAD, HTN, HLD, Uncontrolled Insulin Dependent DM II (Hgb A1C 11.3% on 7/17/2020), PAD, Osteomyelitis s/p AKA LLE 11/2019 who presented to cardiologist for follow-up. Patient reports SOB after climbing 1-2 flights of stairs resolved with rest. She denies chest pain, palpitations, dizziness, syncope, orthopnea, PND. Does however report RLE dependent edema. In light of pt's risk factors, known 3VCAD, CCS Angina Class III Equivalent Symptoms, she is now sp cardiac cath 7/17/20, revealing severe 3VCAD. Presents for elective surgery. On 7/22 underwent CABG w [facility], 7/24 went for PPM placement. POD 5 transferred to SDU. Plans remain for DC to [facility] once stable, accepted to facility. Dislikes glucerna shakes, had episode of hypoglycemia overnight. Fair intake noted. Attempted to reinforce DM diet, unsure of comprehension.     Previous Nutrition Diagnosis: Increased nutrient needs r/t post-op demand AEB hypermetabolic state     Active [ x  ]  Resolved [   ]    If resolved, new PES:     Goal: meet >75% EER     Recommendations:  1. Reinforce DM ed   2. Manage pain prn   3. Trend wts   4. Encourage intake through day   5. Monitor and replete lytes   6. Check POC q6hrs    Education: reinforced cst cho diet, eating consistently through day     Risk Level: High [   ] Moderate [  x ] Low [   ]

## 2020-07-29 NOTE — OCCUPATIONAL THERAPY INITIAL EVALUATION ADULT - ADDITIONAL COMMENTS
Pt lives in apartment with family, steps to enter, At baseline pt reports being able to transfers with RW to WC (without prosthesis), self toilet, and transfer to shower chair. Pt has assist with dressing, bathing.  Owns shower chair, WC and RW.

## 2020-07-29 NOTE — PROGRESS NOTE ADULT - SUBJECTIVE AND OBJECTIVE BOX
INTERVAL HPI/OVERNIGHT EVENTS:    Patient is a 75y old  Female who presents with a chief complaint of CAD (27 Jul 2020 09:31)  pt was seen and examined at the bedside. S/P CABG. Moderate appetite. RIBEIRO. Pulling approx 300 on IS.  Possible d/c to [facility] tomorrow.      FSG & insulin:  7/28:  Dinner . Lispro 7+4. Ate 1/2 salmon, 1/2 potato, and beg. 1 glucerna.  Bedtime . Lantus 24.  7/29:  Breakfast . Lispro 7. Ate pudding and drank whole glucerna.  Lunch . Lsirpo 7+6    Pt reports the following symptoms:    CONSTITUTIONAL:  Negative fever or chills, feels well, good appetite  EYES:  Negative  blurry vision or double vision  CARDIOVASCULAR:  Negative for chest pain or palpitations  RESPIRATORY:  Negative for cough, wheezing  GASTROINTESTINAL:  Negative for nausea, vomiting, diarrhea, constipation, or abdominal pain  GENITOURINARY:  Negative frequency, urgency or dysuria  NEUROLOGIC:  No headache, confusion, dizziness, lightheadedness      MEDICATIONS  (STANDING):  amLODIPine   Tablet 10 milliGRAM(s) Oral daily  apixaban 5 milliGRAM(s) Oral every 12 hours  aspirin enteric coated 81 milliGRAM(s) Oral daily  dextrose 50% Injectable 25 Gram(s) IV Push once  furosemide   Injectable 40 milliGRAM(s) IV Push every 8 hours  insulin glargine Injectable (LANTUS) 24 Unit(s) SubCutaneous at bedtime  insulin lispro (HumaLOG) corrective regimen sliding scale   SubCutaneous Before meals and at bedtime  insulin lispro Injectable (HumaLOG) 10 Unit(s) SubCutaneous three times a day before meals  levoFLOXacin  Tablet 500 milliGRAM(s) Oral every 24 hours  metoprolol tartrate 50 milliGRAM(s) Oral every 8 hours  pantoprazole    Tablet 40 milliGRAM(s) Oral before breakfast  polyethylene glycol 3350 17 Gram(s) Oral daily  potassium chloride    Tablet ER 40 milliEquivalent(s) Oral every 12 hours  senna 2 Tablet(s) Oral at bedtime  sodium chloride 0.9% lock flush 3 milliLiter(s) IV Push every 8 hours    MEDICATIONS  (PRN):  oxyCODONE    IR 5 milliGRAM(s) Oral every 6 hours PRN Moderate Pain (4 - 6)      PHYSICAL EXAM  Vital Signs Last 24 Hrs  T(C): 36.2 (29 Jul 2020 17:22), Max: 36.8 (28 Jul 2020 20:57)  T(F): 97.2 (29 Jul 2020 17:22), Max: 98.2 (28 Jul 2020 20:57)  HR: 72 (29 Jul 2020 15:34) (70 - 86)  BP: 134/90 (29 Jul 2020 15:34) (117/59 - 173/74)  BP(mean): 108 (29 Jul 2020 15:34) (83 - 108)  RR: 18 (29 Jul 2020 15:34) (18 - 20)  SpO2: 96% (29 Jul 2020 15:34) (94% - 97%)    Constitutional: wn/wd in NAD.   HEENT: NCAT, MMM, OP clear, EOMI, no proptosis or lid retraction  Neck: no thyromegaly or palpable thyroid nodules   Respiratory: lungs CTAB. Decreased BS to left.   Cardiovascular: regular rhythm, normal S1 and S2, no audible murmurs, no peripheral edema  GI: soft, NT/ND, no masses/HSM appreciated.  Neurology: no tremors, DTR 2+  Skin: no visible rashes/lesions. no acanthosis nigricans. no lipohypertrophy. no cushing's stigmata.  Psychiatric: AAO x 3, normal affect/mood.    LABS:                        10.4   9.78  )-----------( 267      ( 29 Jul 2020 07:06 )             32.7     07-29    139  |  100  |  9   ----------------------------<  147<H>  3.9   |  27  |  0.52    Ca    9.0      29 Jul 2020 07:06  Mg     1.8     07-29    TPro  6.3  /  Alb  3.3  /  TBili  0.8  /  DBili  x   /  AST  56<H>  /  ALT  135<H>  /  AlkPhos  418<H>  07-29    PT/INR - ( 29 Jul 2020 07:06 )   PT: 21.0 sec;   INR: 1.80          PTT - ( 29 Jul 2020 07:06 )  PTT:29.1 sec    Thyroid Stimulating Hormone, Serum: 2.238 uIU/mL (07-17 @ 09:58)      HbA1C:   CAPILLARY BLOOD GLUCOSE      POCT Blood Glucose.: 140 mg/dL (29 Jul 2020 16:48)  POCT Blood Glucose.: 280 mg/dL (29 Jul 2020 12:33)  POCT Blood Glucose.: 133 mg/dL (29 Jul 2020 09:02)  POCT Blood Glucose.: 144 mg/dL (29 Jul 2020 06:59)  POCT Blood Glucose.: 133 mg/dL (28 Jul 2020 21:09)      A/P:75y Female with PMHx known 3VCAD (diagnostic cath 8/2019 @ [place]), HTN, HLD, Uncontrolled Insulin Dependent DM II (Hgb A1C 11.8% on 7/17/2020), PAD s/p LSFA/Popliteal/TP trunk and Posterior Tibial PTA and stent 8/2019  (s/p peripheral angiogram @ [place] 5/2020 with "ballooning and stenting" per patient- on Eliquis, Osteomyelitis s/p AKA LLE 11/2019 who presented to cardiologist Dr. Yepez for follow-up.  s/p CABG on 7/22/2020. s/p insulin drip. Undergoing endocrine consult for DM management.    1.  DM - type 2, uncontrolled, complicated with hyperglycemia  Hgb A1C 11.8  Please continue lantus  24 units at night.   Please increase lispro to 10   units before each meal  Please continue lispro moderate sliding scale four times daily with meals and at bedtime    Pt's fingerstick glucose goal is 80- 180    Will continue to monitor     For discharge, pt can continue    Pt can follow up at discharge with [facility] by calling  to make an appointment.    discussed case with Dr. stratton  and update primary team

## 2020-07-29 NOTE — OCCUPATIONAL THERAPY INITIAL EVALUATION ADULT - STANDING BALANCE: DYNAMIC, REHAB EVAL
Pt ambulated 3 steps from chair to W/C with mod A x2 with RW (LLE Prosthesis donned) and 2 steps from W/C to stretcher bed/poor balance

## 2020-07-29 NOTE — OCCUPATIONAL THERAPY INITIAL EVALUATION ADULT - PLANNED THERAPY INTERVENTIONS, OT EVAL
strengthening/prosthetic fitting/training/ROM/transfer training/bed mobility training/ADL retraining/balance training

## 2020-07-29 NOTE — PROGRESS NOTE ADULT - SUBJECTIVE AND OBJECTIVE BOX
Patient discussed on morning rounds with Dr. Tavarez    Operation / Date: 7/22 CABG X3 (LIMA-LAD, SVG-Ramus, RCA)  7/24 PPM placement     SUBJECTIVE ASSESSMENT:  75y Female assessed at bedside this morning. The patient states she feels well, no acute complaints. She denies chest pain or pain of any kind. She had a bowel movement yesterday and is using IS (500 cc, educated on proper technique). Denies fever, chills, nausea, vomiting.     Vital Signs Last 24 Hrs  T(C): 36.6 (29 Jul 2020 14:05), Max: 36.8 (28 Jul 2020 20:57)  T(F): 97.9 (29 Jul 2020 14:05), Max: 98.2 (28 Jul 2020 20:57)  HR: 70 (29 Jul 2020 13:11) (70 - 86)  BP: 133/62 (29 Jul 2020 13:11) (117/59 - 173/74)  BP(mean): 89 (29 Jul 2020 13:11) (83 - 107)  RR: 18 (29 Jul 2020 13:11) (18 - 22)  SpO2: 95% (29 Jul 2020 13:11) (94% - 97%)  I&O's Detail    28 Jul 2020 07:01  -  29 Jul 2020 07:00  --------------------------------------------------------  IN:    Oral Fluid: 740 mL  Total IN: 740 mL    OUT:    Voided: 3260 mL  Total OUT: 3260 mL    Total NET: -2520 mL      29 Jul 2020 07:01  -  29 Jul 2020 15:25  --------------------------------------------------------  IN:    Oral Fluid: 240 mL  Total IN: 240 mL    OUT:    Voided: 200 mL  Total OUT: 200 mL    Total NET: 40 mL    CHEST TUBE:  No.   FIDELINA DRAIN:  No.  EPICARDIAL WIRES: No.  TIE DOWNS: Yes  JAEGER: No.    Physical Exam  CONSTITUTIONAL: Well appearing in NAD assessed sitting comfortably at bedside   NEURO: No focal deficits noted, moving bilateral upper and lower extremities                    CV: RRR, no murmurs, rubs, gallops  RESPIRATORY: Clear to auscultation bilateral posterior lung fields, no wheezes, rales, rhonchi   GI: +BS, NT/ND  MUSKULOSKELETAL: +L BKA. No peripheral edema or calf tenderness right LE. Full strength and ROM R LE and bilateral upper extremities   VASCULAR: R distal pulses 2+  INCISIONS: MSI clean, dry, intact. Right SVG harvest site clean, dry, intact.     LABS:                        10.4   9.78  )-----------( 267      ( 29 Jul 2020 07:06 )             32.7       COUMADIN: No    PT/INR - ( 29 Jul 2020 07:06 )   PT: 21.0 sec;   INR: 1.80          PTT - ( 29 Jul 2020 07:06 )  PTT:29.1 sec    07-29    139  |  100  |  9   ----------------------------<  147<H>  3.9   |  27  |  0.52    Ca    9.0      29 Jul 2020 07:06  Mg     1.8     07-29    TPro  6.3  /  Alb  3.3  /  TBili  0.8  /  DBili  x   /  AST  56<H>  /  ALT  135<H>  /  AlkPhos  418<H>  07-29    MEDICATIONS  (STANDING):  amLODIPine   Tablet 10 milliGRAM(s) Oral daily  apixaban 5 milliGRAM(s) Oral every 12 hours  aspirin enteric coated 81 milliGRAM(s) Oral daily  dextrose 50% Injectable 25 Gram(s) IV Push once  furosemide   Injectable 40 milliGRAM(s) IV Push every 8 hours  insulin glargine Injectable (LANTUS) 24 Unit(s) SubCutaneous at bedtime  insulin lispro (HumaLOG) corrective regimen sliding scale   SubCutaneous Before meals and at bedtime  insulin lispro Injectable (HumaLOG) 7 Unit(s) SubCutaneous three times a day before meals  levoFLOXacin  Tablet 500 milliGRAM(s) Oral every 24 hours  metoprolol tartrate 50 milliGRAM(s) Oral every 8 hours  pantoprazole    Tablet 40 milliGRAM(s) Oral before breakfast  polyethylene glycol 3350 17 Gram(s) Oral daily  potassium chloride    Tablet ER 40 milliEquivalent(s) Oral every 12 hours  senna 2 Tablet(s) Oral at bedtime  sodium chloride 0.9% lock flush 3 milliLiter(s) IV Push every 8 hours    MEDICATIONS  (PRN):  oxyCODONE    IR 5 milliGRAM(s) Oral every 6 hours PRN Moderate Pain (4 - 6)    RADIOLOGY & ADDITIONAL TESTS:  < from: Xray Chest 1 View- PORTABLE-Routine (07.28.20 @ 05:04) >  INTERPRETATION:  Portable chest    HISTORY: Follow-up abnormal exam    IMPRESSION:    Left mid lung focal atelectasis partially improved since prior exam 7/27/2020. No other definite change. No acute infiltrates appearing. No pneumothorax. Postoperative changes and prominent size heart again noted.    < from: TTE Echo Complete w/o Contrast w/ Doppler (07.17.20 @ 16:10) >  CONCLUSIONS:     1. No significant valvular disease.   2. The right ventricle is normal in size. Right ventricular systolic function is normal.   3. There is mild-to-moderate concentric left ventricular hypertrophy. The left ventricle is normal insize and systolic function with a calculated ejection fraction of 55-60%. There are no regional wall motion abnormalities seen.   4. No pericardial effusion. Patient discussed on morning rounds with Dr. Tavarez    Operation / Date: 7/22 CABG X3 (LIMA-LAD, SVG-Ramus, RCA)  7/24 PPM placement     SUBJECTIVE ASSESSMENT:  75y Female assessed at bedside this morning. The patient states she feels well, no acute complaints. She denies chest pain or pain of any kind. She had a bowel movement yesterday and is using IS (500 cc, educated on proper technique). Denies fever, chills, nausea, vomiting.     Vital Signs Last 24 Hrs  T(C): 36.6 (29 Jul 2020 14:05), Max: 36.8 (28 Jul 2020 20:57)  T(F): 97.9 (29 Jul 2020 14:05), Max: 98.2 (28 Jul 2020 20:57)  HR: 70 (29 Jul 2020 13:11) (70 - 86)  BP: 133/62 (29 Jul 2020 13:11) (117/59 - 173/74)  BP(mean): 89 (29 Jul 2020 13:11) (83 - 107)  RR: 18 (29 Jul 2020 13:11) (18 - 22)  SpO2: 95% (29 Jul 2020 13:11) (94% - 97%)  I&O's Detail    28 Jul 2020 07:01  -  29 Jul 2020 07:00  --------------------------------------------------------  IN:    Oral Fluid: 740 mL  Total IN: 740 mL    OUT:    Voided: 3260 mL  Total OUT: 3260 mL    Total NET: -2520 mL      29 Jul 2020 07:01  -  29 Jul 2020 15:25  --------------------------------------------------------  IN:    Oral Fluid: 240 mL  Total IN: 240 mL    OUT:    Voided: 200 mL  Total OUT: 200 mL    Total NET: 40 mL    CHEST TUBE:  No.   FIDELINA DRAIN:  No.  EPICARDIAL WIRES: No.  TIE DOWNS: Yes  JAEGER: No.    Physical Exam  CONSTITUTIONAL: Well appearing in NAD assessed sitting comfortably at bedside   NEURO: No focal deficits noted, moving bilateral upper and lower extremities                    CV: RRR, no murmurs, rubs, gallops  RESPIRATORY: Clear to auscultation bilateral posterior lung fields, no wheezes, rales, rhonchi   GI: +BS, NT/ND  MUSKULOSKELETAL: +L BKA. No peripheral edema or calf tenderness right LE. Full strength and ROM R LE and bilateral upper extremities   VASCULAR: R distal pulses 2+  INCISIONS: MSI clean, dry, intact. Right SVG harvest site clean, dry, intact.     LABS:                        10.4   9.78  )-----------( 267      ( 29 Jul 2020 07:06 )             32.7       COUMADIN: No    PT/INR - ( 29 Jul 2020 07:06 )   PT: 21.0 sec;   INR: 1.80          PTT - ( 29 Jul 2020 07:06 )  PTT:29.1 sec    07-29    139  |  100  |  9   ----------------------------<  147<H>  3.9   |  27  |  0.52    Ca    9.0      29 Jul 2020 07:06  Mg     1.8     07-29    TPro  6.3  /  Alb  3.3  /  TBili  0.8  /  DBili  x   /  AST  56<H>  /  ALT  135<H>  /  AlkPhos  418<H>  07-29    MEDICATIONS  (STANDING):  amLODIPine   Tablet 10 milliGRAM(s) Oral daily  apixaban 5 milliGRAM(s) Oral every 12 hours  aspirin enteric coated 81 milliGRAM(s) Oral daily  dextrose 50% Injectable 25 Gram(s) IV Push once  furosemide   Injectable 40 milliGRAM(s) IV Push every 8 hours  insulin glargine Injectable (LANTUS) 24 Unit(s) SubCutaneous at bedtime  insulin lispro (HumaLOG) corrective regimen sliding scale   SubCutaneous Before meals and at bedtime  insulin lispro Injectable (HumaLOG) 7 Unit(s) SubCutaneous three times a day before meals  levoFLOXacin  Tablet 500 milliGRAM(s) Oral every 24 hours  metoprolol tartrate 50 milliGRAM(s) Oral every 8 hours  pantoprazole    Tablet 40 milliGRAM(s) Oral before breakfast  polyethylene glycol 3350 17 Gram(s) Oral daily  potassium chloride    Tablet ER 40 milliEquivalent(s) Oral every 12 hours  senna 2 Tablet(s) Oral at bedtime  sodium chloride 0.9% lock flush 3 milliLiter(s) IV Push every 8 hours    MEDICATIONS  (PRN):  oxyCODONE    IR 5 milliGRAM(s) Oral every 6 hours PRN Moderate Pain (4 - 6)    RADIOLOGY & ADDITIONAL TESTS:  < from: Xray Chest 1 View- PORTABLE-Routine (07.28.20 @ 05:04) >  INTERPRETATION:  Portable chest    HISTORY: Follow-up abnormal exam    IMPRESSION:    Left mid lung focal atelectasis partially improved since prior exam 7/27/2020. No other definite change. No acute infiltrates appearing. No pneumothorax. Postoperative changes and prominent size heart again noted.    < from: TTE Echo Complete w/o Contrast w/ Doppler (07.17.20 @ 16:10) >  CONCLUSIONS:     1. No significant valvular disease.   2. The right ventricle is normal in size. Right ventricular systolic function is normal.   3. There is mild-to-moderate concentric left ventricular hypertrophy. The left ventricle is normal insize and systolic function with a calculated ejection fraction of 55-60%. There are no regional wall motion abnormalities seen.   4. No pericardial effusion.    A/P:    76 y/o female with PMHx of 3VCAD (diagnostic cath 8/2019 @ [place]), HTN, HLD, Uncontrolled Insulin Dependent DM II (Hgb A1C 11.8% on 7/17/2020), PAD s/p LSFA/Popliteal/TP trunk and Posterior Tibial PTA and stent 8/2019, (s/p peripheral angiogram @ [place] 5/2020 with "ballooning and stenting" per patient- on Eliquis), Osteomyelitis s/p AKA LLE 11/2019 who presented to cardiologist Dr. Yepez for follow-up. Patient reported RIBEIRO and was brought in for cardiac cath on 7/17/2020 revealing severe 3VCAD, EF 65%. Cardiothoracic Surgery, Dr. Tavarez, consulted for evaluation for CABG and patient came back for elective surgery.  On 7/22/2020 patient underwent CABGx3, EF normal.  In the OR received 2 units PRBC.  Paced for underlying asystole.  Arrived to CTICU on levophed/Vasopressin.  Extubated POD #1.  POD #2 Micra placed with EPS.  POD #3 LFTs elevated, RUQ U/S done with GB thickening, so patient started on Levaquin.  POD #4 diuresed. POD #5 transferred to stepdown unit, lasix increased with goal net negative 2L /day. Holding statin due to transaminitis. Dispo planning, likely will go to rehab.     Neurovascular:   - No delirium. Pain well controlled with current regimen.  -Continue tylenol PRN    Cardiovascular:   - POD_ s/p _  -Hemodynamically stable. HR controlled (X-X)  - Hx of HTN, BP controlled (X-X)  - ASA, Plavix, BB, statin  - EKG. TTE. Cardiac Panel. Lipid Panel. BNP.      Respiratory:   - 02 Sat = 98% on RA.  -If on oxygen wean to RA from for O2 Sat > 93%.  -Encourage C+DB and Use of IS 10x / hr while awake.  -CXR.    GI:   - Stable.  -NPO after MN.  -Continue GI PPX with protonix  -PO Diet.    Renal / :  - BUN/Cr stable at X/X  -Continue to monitor renal function.  -Monitor I/O's.  - Replete electrolytes PRN    Endocrine:    -A1c.  -TSH.    Hematologic:  -H/H stable at X/X with no obvious signs of bleeding  -Coagulation Panel.    ID:  -Pt remains afebrile with no elevation in WBC or signs of infection  -Continue to monitor CBC  -Observe for SIRS/Sepsis Syndrome.    Prophylaxis:  -DVT prophylaxis with 5000 SubQ Heparin q8h.  -SCD's    Disposition:  -Home when medically appropriate. Patient discussed on morning rounds with Dr. Tavarez    Operation / Date: 7/22 CABG X3 (LIMA-LAD, SVG-Ramus, RCA)  7/24 PPM placement     SUBJECTIVE ASSESSMENT:  75y Female assessed at bedside this morning. The patient states she feels well, no acute complaints. She denies chest pain or pain of any kind. She had a bowel movement yesterday and is using IS (500 cc, educated on proper technique). Denies fever, chills, nausea, vomiting.     Vital Signs Last 24 Hrs  T(C): 36.6 (29 Jul 2020 14:05), Max: 36.8 (28 Jul 2020 20:57)  T(F): 97.9 (29 Jul 2020 14:05), Max: 98.2 (28 Jul 2020 20:57)  HR: 70 (29 Jul 2020 13:11) (70 - 86)  BP: 133/62 (29 Jul 2020 13:11) (117/59 - 173/74)  BP(mean): 89 (29 Jul 2020 13:11) (83 - 107)  RR: 18 (29 Jul 2020 13:11) (18 - 22)  SpO2: 95% (29 Jul 2020 13:11) (94% - 97%)  I&O's Detail    28 Jul 2020 07:01  -  29 Jul 2020 07:00  --------------------------------------------------------  IN:    Oral Fluid: 740 mL  Total IN: 740 mL    OUT:    Voided: 3260 mL  Total OUT: 3260 mL    Total NET: -2520 mL      29 Jul 2020 07:01  -  29 Jul 2020 15:25  --------------------------------------------------------  IN:    Oral Fluid: 240 mL  Total IN: 240 mL    OUT:    Voided: 200 mL  Total OUT: 200 mL    Total NET: 40 mL    CHEST TUBE:  No.   FIDELINA DRAIN:  No.  EPICARDIAL WIRES: No.  TIE DOWNS: Yes  JAEGER: No.    Physical Exam  CONSTITUTIONAL: Well appearing in NAD assessed sitting comfortably at bedside   NEURO: No focal deficits noted, moving bilateral upper and lower extremities                    CV: RRR, no murmurs, rubs, gallops  RESPIRATORY: Clear to auscultation bilateral posterior lung fields, no wheezes, rales, rhonchi   GI: +BS, NT/ND  MUSKULOSKELETAL: +L BKA. No peripheral edema or calf tenderness right LE. Full strength and ROM R LE and bilateral upper extremities   VASCULAR: R distal pulses 2+  INCISIONS: MSI clean, dry, intact. Right SVG harvest site clean, dry, intact.     LABS:                        10.4   9.78  )-----------( 267      ( 29 Jul 2020 07:06 )             32.7       COUMADIN: No    PT/INR - ( 29 Jul 2020 07:06 )   PT: 21.0 sec;   INR: 1.80          PTT - ( 29 Jul 2020 07:06 )  PTT:29.1 sec    07-29    139  |  100  |  9   ----------------------------<  147<H>  3.9   |  27  |  0.52    Ca    9.0      29 Jul 2020 07:06  Mg     1.8     07-29    TPro  6.3  /  Alb  3.3  /  TBili  0.8  /  DBili  x   /  AST  56<H>  /  ALT  135<H>  /  AlkPhos  418<H>  07-29    MEDICATIONS  (STANDING):  amLODIPine   Tablet 10 milliGRAM(s) Oral daily  apixaban 5 milliGRAM(s) Oral every 12 hours  aspirin enteric coated 81 milliGRAM(s) Oral daily  dextrose 50% Injectable 25 Gram(s) IV Push once  furosemide   Injectable 40 milliGRAM(s) IV Push every 8 hours  insulin glargine Injectable (LANTUS) 24 Unit(s) SubCutaneous at bedtime  insulin lispro (HumaLOG) corrective regimen sliding scale   SubCutaneous Before meals and at bedtime  insulin lispro Injectable (HumaLOG) 7 Unit(s) SubCutaneous three times a day before meals  levoFLOXacin  Tablet 500 milliGRAM(s) Oral every 24 hours  metoprolol tartrate 50 milliGRAM(s) Oral every 8 hours  pantoprazole    Tablet 40 milliGRAM(s) Oral before breakfast  polyethylene glycol 3350 17 Gram(s) Oral daily  potassium chloride    Tablet ER 40 milliEquivalent(s) Oral every 12 hours  senna 2 Tablet(s) Oral at bedtime  sodium chloride 0.9% lock flush 3 milliLiter(s) IV Push every 8 hours    MEDICATIONS  (PRN):  oxyCODONE    IR 5 milliGRAM(s) Oral every 6 hours PRN Moderate Pain (4 - 6)    RADIOLOGY & ADDITIONAL TESTS:  < from: Xray Chest 1 View- PORTABLE-Routine (07.28.20 @ 05:04) >  INTERPRETATION:  Portable chest    HISTORY: Follow-up abnormal exam    IMPRESSION:    Left mid lung focal atelectasis partially improved since prior exam 7/27/2020. No other definite change. No acute infiltrates appearing. No pneumothorax. Postoperative changes and prominent size heart again noted.    < from: TTE Echo Complete w/o Contrast w/ Doppler (07.17.20 @ 16:10) >  CONCLUSIONS:     1. No significant valvular disease.   2. The right ventricle is normal in size. Right ventricular systolic function is normal.   3. There is mild-to-moderate concentric left ventricular hypertrophy. The left ventricle is normal insize and systolic function with a calculated ejection fraction of 55-60%. There are no regional wall motion abnormalities seen.   4. No pericardial effusion.    A/P:    76 y/o female with PMHx of 3VCAD (diagnostic cath 8/2019 @ [place]), HTN, HLD, Uncontrolled Insulin Dependent DM II (Hgb A1C 11.8% on 7/17/2020), PAD s/p LSFA/Popliteal/TP trunk and Posterior Tibial PTA and stent 8/2019, (s/p peripheral angiogram @ [place] 5/2020 with "ballooning and stenting" per patient- on Eliquis), Osteomyelitis s/p AKA LLE 11/2019 who presented to cardiologist Dr. Yepez for follow-up. Patient reported RIBEIRO and was brought in for cardiac cath on 7/17/2020 revealing severe 3VCAD, EF 65%. Cardiothoracic Surgery, Dr. Tavarez, consulted for evaluation for CABG and patient came back for elective surgery.  On 7/22/2020 patient underwent CABGx3 (LIMA-LAD, SVG-Ramus, RCA), EF normal.  In the OR received 2 units PRBC. Intraoperatively she was paced for underlying asystole.  Post operatively she arrived to CTICU on levophed/Vasopressin.  Extubated POD #1.  POD #2 Micra PPM placed with EPS for continued pacing with underlying asystole.  POD #3 LFTs elevated, RUQ U/S done with GB thickening, so patient started on Levaquin and statin was held.  POD #4 diuresed. POD #5 transferred to stepdown unit, lasix increased with goal net negative 2L /day. She has remained stable on the floor POD#6-7, undergoing diuresis and discharge planning to rehab once authorized.     Neurovascular:   - No delirium. Pain well controlled with current regimen.  -Continue oxycodone IR PRN, holding tylenol/percocet for elevated LFTs    Cardiovascular:   - POD7 s/p CABG X3 (LIMA-LAD, SVG-Ramus, RCA)  - Paced for underlying asystole post operatively, PPM placed 7/24  -Hemodynamically stable. HR controlled (70-94)  - Hx of HTN, BP controlled (117//118), continue metoprolol 50 q8, amlodipine 10 mg  - CAD: continue ASA, holding statin for transaminitis   - Continue eliquis for PAD    Respiratory:   - 02 Sat = 97% on RA.  -Encourage C+DB and Use of IS 10x / hr while awake.  -CXR PA/lateral with atelectasis vs. stable effusion, satting well on RA    GI:   - Post op transaminitis, improved, RUQ ultrasound showed GB thickening, continue levofloxacin 500 mg q24  -Continue GI PPX with protonix  -PO Diet.    Renal / :  - BUN/Cr stable at 9/0.52  -Continue to monitor renal function.  -Monitor I/O's.  - Replete electrolytes PRN  - continue to diurese, 40 mg lasix IV push q8 hours     Endocrine:    -A1c 11.8, poorly controlled diabetes, endocrine following continue to appreciate recs  -TSH 2.238, no known hx of thyroid disease     Hematologic:  -H/H stable at 10.4/32.7 with no obvious signs of bleeding  -Coagulation Panel.    ID:  -Pt remains afebrile with no elevation in WBC or signs of infection  - RUQ ultrasound showed GB thickening, continue levofloxacin 500 mg q24  -Continue to monitor CBC  -Observe for SIRS/Sepsis Syndrome.    Prophylaxis:  -DVT prophylaxis with eliquis   -SCD's    Disposition:  -Home when medically appropriate.

## 2020-07-30 ENCOUNTER — TRANSCRIPTION ENCOUNTER (OUTPATIENT)
Age: 75
End: 2020-07-30

## 2020-07-30 LAB
ALBUMIN SERPL ELPH-MCNC: 3.2 G/DL — LOW (ref 3.3–5)
ALP SERPL-CCNC: 348 U/L — HIGH (ref 40–120)
ALT FLD-CCNC: 93 U/L — HIGH (ref 10–45)
ANION GAP SERPL CALC-SCNC: 12 MMOL/L — SIGNIFICANT CHANGE UP (ref 5–17)
AST SERPL-CCNC: 33 U/L — SIGNIFICANT CHANGE UP (ref 10–40)
BILIRUB SERPL-MCNC: 0.8 MG/DL — SIGNIFICANT CHANGE UP (ref 0.2–1.2)
BUN SERPL-MCNC: 10 MG/DL — SIGNIFICANT CHANGE UP (ref 7–23)
CALCIUM SERPL-MCNC: 9.1 MG/DL — SIGNIFICANT CHANGE UP (ref 8.4–10.5)
CHLORIDE SERPL-SCNC: 99 MMOL/L — SIGNIFICANT CHANGE UP (ref 96–108)
CO2 SERPL-SCNC: 29 MMOL/L — SIGNIFICANT CHANGE UP (ref 22–31)
CREAT SERPL-MCNC: 0.55 MG/DL — SIGNIFICANT CHANGE UP (ref 0.5–1.3)
GLUCOSE BLDC GLUCOMTR-MCNC: 100 MG/DL — HIGH (ref 70–99)
GLUCOSE BLDC GLUCOMTR-MCNC: 108 MG/DL — HIGH (ref 70–99)
GLUCOSE BLDC GLUCOMTR-MCNC: 132 MG/DL — HIGH (ref 70–99)
GLUCOSE BLDC GLUCOMTR-MCNC: 202 MG/DL — HIGH (ref 70–99)
GLUCOSE BLDC GLUCOMTR-MCNC: 91 MG/DL — SIGNIFICANT CHANGE UP (ref 70–99)
GLUCOSE SERPL-MCNC: 108 MG/DL — HIGH (ref 70–99)
HCT VFR BLD CALC: 32.6 % — LOW (ref 34.5–45)
HGB BLD-MCNC: 10.4 G/DL — LOW (ref 11.5–15.5)
MAGNESIUM SERPL-MCNC: 1.8 MG/DL — SIGNIFICANT CHANGE UP (ref 1.6–2.6)
MCHC RBC-ENTMCNC: 28 PG — SIGNIFICANT CHANGE UP (ref 27–34)
MCHC RBC-ENTMCNC: 31.9 GM/DL — LOW (ref 32–36)
MCV RBC AUTO: 87.6 FL — SIGNIFICANT CHANGE UP (ref 80–100)
NRBC # BLD: 0 /100 WBCS — SIGNIFICANT CHANGE UP (ref 0–0)
PLATELET # BLD AUTO: 303 K/UL — SIGNIFICANT CHANGE UP (ref 150–400)
POTASSIUM SERPL-MCNC: 4.1 MMOL/L — SIGNIFICANT CHANGE UP (ref 3.5–5.3)
POTASSIUM SERPL-SCNC: 4.1 MMOL/L — SIGNIFICANT CHANGE UP (ref 3.5–5.3)
PROT SERPL-MCNC: 6.5 G/DL — SIGNIFICANT CHANGE UP (ref 6–8.3)
RBC # BLD: 3.72 M/UL — LOW (ref 3.8–5.2)
RBC # FLD: 15.9 % — HIGH (ref 10.3–14.5)
SODIUM SERPL-SCNC: 140 MMOL/L — SIGNIFICANT CHANGE UP (ref 135–145)
WBC # BLD: 9.53 K/UL — SIGNIFICANT CHANGE UP (ref 3.8–10.5)
WBC # FLD AUTO: 9.53 K/UL — SIGNIFICANT CHANGE UP (ref 3.8–10.5)

## 2020-07-30 PROCEDURE — 99231 SBSQ HOSP IP/OBS SF/LOW 25: CPT | Mod: GC

## 2020-07-30 RX ORDER — INSULIN GLARGINE 100 [IU]/ML
20 INJECTION, SOLUTION SUBCUTANEOUS AT BEDTIME
Refills: 0 | Status: DISCONTINUED | OUTPATIENT
Start: 2020-07-30 | End: 2020-07-31

## 2020-07-30 RX ORDER — INSULIN LISPRO 100/ML
13 VIAL (ML) SUBCUTANEOUS
Refills: 0 | Status: DISCONTINUED | OUTPATIENT
Start: 2020-07-30 | End: 2020-07-31

## 2020-07-30 RX ORDER — INSULIN LISPRO 100/ML
14 VIAL (ML) SUBCUTANEOUS ONCE
Refills: 0 | Status: DISCONTINUED | OUTPATIENT
Start: 2020-07-30 | End: 2020-07-30

## 2020-07-30 RX ORDER — SENNA PLUS 8.6 MG/1
2 TABLET ORAL
Qty: 0 | Refills: 0 | DISCHARGE
Start: 2020-07-30

## 2020-07-30 RX ORDER — ATORVASTATIN CALCIUM 80 MG/1
1 TABLET, FILM COATED ORAL
Qty: 0 | Refills: 0 | DISCHARGE

## 2020-07-30 RX ORDER — POTASSIUM CHLORIDE 20 MEQ
20 PACKET (EA) ORAL ONCE
Refills: 0 | Status: DISCONTINUED | OUTPATIENT
Start: 2020-07-31 | End: 2020-07-31

## 2020-07-30 RX ORDER — INSULIN GLARGINE 100 [IU]/ML
20 INJECTION, SOLUTION SUBCUTANEOUS
Qty: 0 | Refills: 0 | DISCHARGE
Start: 2020-07-30

## 2020-07-30 RX ORDER — PANTOPRAZOLE SODIUM 20 MG/1
1 TABLET, DELAYED RELEASE ORAL
Qty: 0 | Refills: 0 | DISCHARGE
Start: 2020-07-30

## 2020-07-30 RX ORDER — INSULIN ASPART 100 [IU]/ML
4 INJECTION, SOLUTION SUBCUTANEOUS
Qty: 0 | Refills: 0 | DISCHARGE

## 2020-07-30 RX ORDER — LISINOPRIL 2.5 MG/1
1 TABLET ORAL
Qty: 0 | Refills: 0 | DISCHARGE

## 2020-07-30 RX ORDER — INSULIN LISPRO 100/ML
10 VIAL (ML) SUBCUTANEOUS
Qty: 0 | Refills: 0 | DISCHARGE
Start: 2020-07-30

## 2020-07-30 RX ORDER — ISOSORBIDE DINITRATE 5 MG/1
2 TABLET ORAL
Qty: 0 | Refills: 0 | DISCHARGE

## 2020-07-30 RX ORDER — METOPROLOL TARTRATE 50 MG
1 TABLET ORAL
Qty: 0 | Refills: 0 | DISCHARGE
Start: 2020-07-30

## 2020-07-30 RX ORDER — ASPIRIN/CALCIUM CARB/MAGNESIUM 324 MG
1 TABLET ORAL
Qty: 0 | Refills: 0 | DISCHARGE
Start: 2020-07-30

## 2020-07-30 RX ORDER — FUROSEMIDE 40 MG
40 TABLET ORAL DAILY
Refills: 0 | Status: DISCONTINUED | OUTPATIENT
Start: 2020-07-31 | End: 2020-07-31

## 2020-07-30 RX ORDER — AMLODIPINE BESYLATE 2.5 MG/1
1 TABLET ORAL
Qty: 0 | Refills: 0 | DISCHARGE

## 2020-07-30 RX ORDER — POTASSIUM CHLORIDE 20 MEQ
1 PACKET (EA) ORAL
Qty: 0 | Refills: 0 | DISCHARGE
Start: 2020-07-30

## 2020-07-30 RX ORDER — INSULIN LISPRO 100/ML
13 VIAL (ML) SUBCUTANEOUS
Qty: 0 | Refills: 0 | DISCHARGE
Start: 2020-07-30

## 2020-07-30 RX ORDER — INSULIN GLARGINE 100 [IU]/ML
24 INJECTION, SOLUTION SUBCUTANEOUS
Qty: 0 | Refills: 0 | DISCHARGE
Start: 2020-07-30

## 2020-07-30 RX ORDER — AMLODIPINE BESYLATE 2.5 MG/1
1 TABLET ORAL
Qty: 0 | Refills: 0 | DISCHARGE
Start: 2020-07-30

## 2020-07-30 RX ORDER — INSULIN DETEMIR 100/ML (3)
24 INSULIN PEN (ML) SUBCUTANEOUS
Qty: 0 | Refills: 0 | DISCHARGE

## 2020-07-30 RX ORDER — OXYCODONE HYDROCHLORIDE 5 MG/1
1 TABLET ORAL
Qty: 0 | Refills: 0 | DISCHARGE
Start: 2020-07-30

## 2020-07-30 RX ORDER — ASPIRIN/CALCIUM CARB/MAGNESIUM 324 MG
1 TABLET ORAL
Qty: 0 | Refills: 0 | DISCHARGE

## 2020-07-30 RX ORDER — FUROSEMIDE 40 MG
1 TABLET ORAL
Qty: 0 | Refills: 0 | DISCHARGE
Start: 2020-07-30

## 2020-07-30 RX ORDER — ISOPROPYL ALCOHOL, BENZOCAINE .7; .06 ML/ML; ML/ML
1 SWAB TOPICAL
Qty: 100 | Refills: 1
Start: 2020-07-30 | End: 2020-09-17

## 2020-07-30 RX ORDER — APIXABAN 2.5 MG/1
1 TABLET, FILM COATED ORAL
Qty: 0 | Refills: 0 | DISCHARGE

## 2020-07-30 RX ORDER — INSULIN LISPRO 100/ML
3 VIAL (ML) SUBCUTANEOUS ONCE
Refills: 0 | Status: COMPLETED | OUTPATIENT
Start: 2020-07-30 | End: 2020-07-30

## 2020-07-30 RX ORDER — APIXABAN 2.5 MG/1
1 TABLET, FILM COATED ORAL
Qty: 0 | Refills: 0 | DISCHARGE
Start: 2020-07-30

## 2020-07-30 RX ADMIN — Medication 40 MILLIEQUIVALENT(S): at 08:50

## 2020-07-30 RX ADMIN — Medication 10 UNIT(S): at 12:13

## 2020-07-30 RX ADMIN — Medication 10 UNIT(S): at 08:15

## 2020-07-30 RX ADMIN — APIXABAN 5 MILLIGRAM(S): 2.5 TABLET, FILM COATED ORAL at 05:28

## 2020-07-30 RX ADMIN — SODIUM CHLORIDE 3 MILLILITER(S): 9 INJECTION INTRAMUSCULAR; INTRAVENOUS; SUBCUTANEOUS at 15:01

## 2020-07-30 RX ADMIN — Medication 50 MILLIGRAM(S): at 17:47

## 2020-07-30 RX ADMIN — Medication 10 UNIT(S): at 17:06

## 2020-07-30 RX ADMIN — APIXABAN 5 MILLIGRAM(S): 2.5 TABLET, FILM COATED ORAL at 17:47

## 2020-07-30 RX ADMIN — SODIUM CHLORIDE 3 MILLILITER(S): 9 INJECTION INTRAMUSCULAR; INTRAVENOUS; SUBCUTANEOUS at 05:12

## 2020-07-30 RX ADMIN — Medication 3 UNIT(S): at 17:47

## 2020-07-30 RX ADMIN — Medication 50 MILLIGRAM(S): at 09:00

## 2020-07-30 RX ADMIN — Medication 4: at 12:12

## 2020-07-30 RX ADMIN — SODIUM CHLORIDE 3 MILLILITER(S): 9 INJECTION INTRAMUSCULAR; INTRAVENOUS; SUBCUTANEOUS at 22:21

## 2020-07-30 RX ADMIN — Medication 50 MILLIGRAM(S): at 03:01

## 2020-07-30 RX ADMIN — INSULIN GLARGINE 20 UNIT(S): 100 INJECTION, SOLUTION SUBCUTANEOUS at 22:20

## 2020-07-30 RX ADMIN — SENNA PLUS 2 TABLET(S): 8.6 TABLET ORAL at 22:21

## 2020-07-30 RX ADMIN — PANTOPRAZOLE SODIUM 40 MILLIGRAM(S): 20 TABLET, DELAYED RELEASE ORAL at 06:32

## 2020-07-30 RX ADMIN — AMLODIPINE BESYLATE 10 MILLIGRAM(S): 2.5 TABLET ORAL at 05:28

## 2020-07-30 RX ADMIN — Medication 81 MILLIGRAM(S): at 11:15

## 2020-07-30 RX ADMIN — Medication 40 MILLIGRAM(S): at 05:28

## 2020-07-30 NOTE — DISCHARGE NOTE PROVIDER - NSDCMRMEDTOKEN_GEN_ALL_CORE_FT
alcohol swabs : Apply topically to affected area 4 times a day   amLODIPine 10 mg oral tablet: 1 tab(s) orally once a day  apixaban 5 mg oral tablet: 1 tab(s) orally every 12 hours  aspirin 81 mg oral delayed release tablet: 1 tab(s) orally once a day  furosemide 40 mg oral tablet: 1 tab(s) orally once a day  insulin glargine: 24 unit(s) subcutaneous once a day (at bedtime)  insulin lispro (concentrated) 200 units/mL subcutaneous solution: 10 unit(s) subcutaneous 3 times a day (with meals)  lancets: 1 application subcutaneously 4 times a day   levoFLOXacin 500 mg oral tablet: 1 tab(s) orally every 24 hours  metoprolol tartrate 50 mg oral tablet: 1 tab(s) orally every 8 hours  oxyCODONE 5 mg oral tablet: 1 tab(s) orally every 6 hours, As needed, Moderate Pain (4 - 6)  pantoprazole 40 mg oral delayed release tablet: 1 tab(s) orally once a day (before a meal)  potassium chloride 20 mEq oral tablet, extended release: 1 tab(s) orally once  senna oral tablet: 2 tab(s) orally once a day (at bedtime) PRN constipation  Tab-A-Jeff oral tablet: 1 tab(s) orally once a day  test strips (per patient&#x27;s insurance): 1 application subcutaneously 4 times a day . ** Compatible with patient&#x27;s glucometer **   Vitamin D3 5000 intl units (125 mcg) oral capsule: 1 cap(s) orally once a day alcohol swabs : Apply topically to affected area 4 times a day   amLODIPine 10 mg oral tablet: 1 tab(s) orally once a day  apixaban 5 mg oral tablet: 1 tab(s) orally every 12 hours  aspirin 81 mg oral delayed release tablet: 1 tab(s) orally once a day  furosemide 40 mg oral tablet: 1 tab(s) orally once a day  insulin glargine: 24 unit(s) subcutaneous once a day (at bedtime)  insulin lispro (concentrated) 200 units/mL subcutaneous solution: 10 unit(s) subcutaneous 3 times a day (with meals)  lancets: 1 application subcutaneously 4 times a day   metoprolol tartrate 50 mg oral tablet: 1 tab(s) orally every 6 hours  oxyCODONE 5 mg oral tablet: 1 tab(s) orally every 6 hours, As needed, Moderate Pain (4 - 6)  pantoprazole 40 mg oral delayed release tablet: 1 tab(s) orally once a day (before a meal)  potassium chloride 20 mEq oral tablet, extended release: 1 tab(s) orally once  senna oral tablet: 2 tab(s) orally once a day (at bedtime) PRN constipation  Tab-A-Jeff oral tablet: 1 tab(s) orally once a day  test strips (per patient&#x27;s insurance): 1 application subcutaneously 4 times a day . ** Compatible with patient&#x27;s glucometer **   Vitamin D3 5000 intl units (125 mcg) oral capsule: 1 cap(s) orally once a day alcohol swabs : Apply topically to affected area 4 times a day   amLODIPine 10 mg oral tablet: 1 tab(s) orally once a day  apixaban 5 mg oral tablet: 1 tab(s) orally every 12 hours  aspirin 81 mg oral delayed release tablet: 1 tab(s) orally once a day  furosemide 40 mg oral tablet: 1 tab(s) orally once a day  insulin glargine: 20 unit(s) subcutaneous once a day (at bedtime)  insulin lispro (concentrated) 200 units/mL subcutaneous solution: 13 unit(s) subcutaneous 3 times a day (with meals)  lancets: 1 application subcutaneously 4 times a day   Metoprolol Tartrate 100 mg oral tablet: 1 tab(s) orally 2 times a day  oxyCODONE 5 mg oral tablet: 1 tab(s) orally every 6 hours, As needed, Moderate Pain (4 - 6)  pantoprazole 40 mg oral delayed release tablet: 1 tab(s) orally once a day (before a meal)  potassium chloride 20 mEq oral tablet, extended release: 1 tab(s) orally once  senna oral tablet: 2 tab(s) orally once a day (at bedtime) PRN constipation  Tab-A-Jeff oral tablet: 1 tab(s) orally once a day  test strips (per patient&#x27;s insurance): 1 application subcutaneously 4 times a day . ** Compatible with patient&#x27;s glucometer **   Vitamin D3 5000 intl units (125 mcg) oral capsule: 1 cap(s) orally once a day

## 2020-07-30 NOTE — DISCHARGE NOTE PROVIDER - CARE PROVIDER_API CALL
Fercho Tavarez  CARDIOVASCULAR SURGERY  [address] [place] [address]  Phone: [phone]  Fax: [phone]  Follow Up Time:     Wil Yepez  CARDIOVASCULAR DISEASE  [address] [address]  Phone: [phone]  Fax: [phone]  Follow Up Time:     SHARRI RAE  [id number]  [address] [place] [address]  Phone: [phone]  Fax: ()-  Follow Up Time:

## 2020-07-30 NOTE — PROGRESS NOTE ADULT - NSHPATTENDINGPLANDISCUSS_GEN_ALL_CORE
Dr. LICONA and CT Surgery staff
Dr. LICONA and CT Surgery
Ms. Soliz and CT Surgery
Ms. Soliz and CT Surgery staff

## 2020-07-30 NOTE — DISCHARGE NOTE PROVIDER - NSDCFUADDAPPT_GEN_ALL_CORE_FT
-Please follow up with Dr. Tavarez or Dr. Anaya (Dr. Tavarez's partner) on 8/11/2020 @ 12:45pm.  The office is located at [facility], [address] [place] [address], [facility], 4th floor. Call us with any questions #[phone].  -Please follow up with Dr. Yepez on 8/17/2020 @ 10:15am at [facility].  Phone number listed in the follow up section.  -Please follow-up with Dr. Steiner (PCP) via telehealth to manage your diabetes.  Please CALL the office to schedule an appointment at [phone].

## 2020-07-30 NOTE — DISCHARGE NOTE PROVIDER - NSDCCPTREATMENT_GEN_ALL_CORE_FT
PRINCIPAL PROCEDURE  Procedure: CABG, with FRANCES  Findings and Treatment: LIMA-LAD, SVG-Ramus,, RCA

## 2020-07-30 NOTE — PROGRESS NOTE ADULT - SUBJECTIVE AND OBJECTIVE BOX
INTERVAL HPI/OVERNIGHT EVENTS:    Patient is a 75y old  Female who presents with a chief complaint of CAD (2020 13:01)  pt was seen and examined at the bedside. S/P CABG.  Pt reports improve in appetite, pt had tea with oat meal for breakfast and soup and chicken salad for lunch.       FSG & Insulin received:    Yesterday:  pre-dinner fs  nutritional lispro  10 units   bedtime fs  lantus  24 units + 4   units lispro SS    Today:  pre-breakfast fs  nutritional lispro  10 units+    units lispro SS        Pt reports the following symptoms:    CONSTITUTIONAL:  Negative fever or chills, feels well, good appetite  CARDIOVASCULAR:  Negative for chest pain or palpitations  RESPIRATORY:  Negative for cough, wheezing, or SOB   GASTROINTESTINAL:  Negative for nausea, vomiting, diarrhea, constipation, or abdominal pain  GENITOURINARY:  Negative frequency, urgency or dysuria  NEUROLOGIC:  No headache, confusion, dizziness, lightheadedness    MEDICATIONS  (STANDING):  amLODIPine   Tablet 10 milliGRAM(s) Oral daily  apixaban 5 milliGRAM(s) Oral every 12 hours  aspirin enteric coated 81 milliGRAM(s) Oral daily  dextrose 50% Injectable 25 Gram(s) IV Push once  insulin glargine Injectable (LANTUS) 20 Unit(s) SubCutaneous at bedtime  insulin lispro (HumaLOG) corrective regimen sliding scale   SubCutaneous Before meals and at bedtime  insulin lispro Injectable (HumaLOG) 13 Unit(s) SubCutaneous three times a day before meals  levoFLOXacin  Tablet 500 milliGRAM(s) Oral every 24 hours  metoprolol tartrate 50 milliGRAM(s) Oral every 8 hours  pantoprazole    Tablet 40 milliGRAM(s) Oral before breakfast  polyethylene glycol 3350 17 Gram(s) Oral daily  senna 2 Tablet(s) Oral at bedtime  sodium chloride 0.9% lock flush 3 milliLiter(s) IV Push every 8 hours    MEDICATIONS  (PRN):  oxyCODONE    IR 5 milliGRAM(s) Oral every 6 hours PRN Moderate Pain (4 - 6)      Past medical history reviewed  Family history reviewed  Social history reviewed    PHYSICAL EXAM  Vital Signs Last 24 Hrs  T(C): 36.6 (2020 16:54), Max: 37.1 (2020 14:02)  T(F): 97.9 (2020 16:54), Max: 98.7 (2020 14:02)  HR: 76 (2020 17:08) (70 - 80)  BP: 145/65 (2020 17:08) (117/65 - 166/72)  BP(mean): 94 (2020 17:08) (86 - 106)  RR: 17 (2020 17:08) (16 - 18)  SpO2: 96% (2020 17:08) (95% - 98%)  Constitutional:  in NAD.   Neck: no thyromegaly or palpable thyroid nodules   Respiratory: lungs CTAB.  Cardiovascular: normal S1 and S2, s/p CACBG   GI: soft, NT/ND, no masses/HSM appreciated.  Skin: no visible rashes/lesions  Psychiatric: AAO x 3, normal affect/mood.  Ext: no edema.       LABS:                        10.4   9.53  )-----------( 303      ( 2020 08:44 )             32.6     07-30    140  |  99  |  10  ----------------------------<  108<H>  4.1   |  29  |  0.55    Ca    9.1      2020 08:44  Mg     1.8         TPro  6.5  /  Alb  3.2<L>  /  TBili  0.8  /  DBili  x   /  AST  33  /  ALT  93<H>  /  AlkPhos  348<H>  0730    PT/INR - ( 2020 07:06 )   PT: 21.0 sec;   INR: 1.80          PTT - ( 2020 07:06 )  PTT:29.1 sec    Thyroid Stimulating Hormone, Serum: 2.238 uIU/mL ( @ 09:58)      HbA1C: 11.8 % ( @ 09:58)      CAPILLARY BLOOD GLUCOSE      POCT Blood Glucose.: 132 mg/dL (2020 16:36)  POCT Blood Glucose.: 202 mg/dL (2020 11:21)  POCT Blood Glucose.: 108 mg/dL (2020 06:43)  POCT Blood Glucose.: 218 mg/dL (2020 21:06)      Cholesterol, Serum: 172 mg/dL (20 @ 09:58)  HDL Cholesterol, Serum: 50 mg/dL (20 @ 09:58)  Triglycerides, Serum: 89 mg/dL (20 @ 09:58)  Direct LDL: 104 mg/dL (20 @ 09:58)      A/P:75y Female with PMHx known 3VCAD (diagnostic cath 2019 @ [place]), HTN, HLD, Uncontrolled Insulin Dependent DM II (Hgb A1C 11.8% on 2020), PAD s/p LSFA/Popliteal/TP trunk and Posterior Tibial PTA and stent 2019  (s/p peripheral angiogram @ [place] 2020 with "ballooning and stenting" per patient- on Eliquis, Osteomyelitis s/p AKA LLE 2019 who presented to cardiologist Dr. Yepez for follow-up.  s/p CABG on 2020. s/p insulin drip. Undergoing endocrine consult for DM management.    1.  DM - type 2, uncontrolled, complicated with hyperglycemia  Hgb A1C 11.8  Please decrease lantus to  20 units at night.   Please increase lispro to 13   units before each meal  Please continue lispro moderate sliding scale four times daily with meals and at bedtime    Pt's fingerstick glucose goal is 80- 180    Will continue to monitor     For discharge, pt can continue    Pt can follow up at discharge with [facility] by calling  to make an appointment.

## 2020-07-30 NOTE — DISCHARGE NOTE PROVIDER - PROVIDER TOKENS
PROVIDER:[TOKEN:[[id number]]],PROVIDER:[TOKEN:[[id number]]],PROVIDER:[TOKEN:[[id number]]]

## 2020-07-30 NOTE — PROGRESS NOTE ADULT - ATTENDING COMMENTS
Pt seen on rounds this afternoon.  Appetite is now back to near-normal and post-prandial glucoses are higher.  Will leave the Lantus at 24 units given the AM FS of 144, but increase the premeal lispro to 10 units.  She appears somewhat less tachypneic but still has decreased air entry at both bases and cannot pull > 500 cc on the spirometer
Pt seen on rounds this afternoon.  Did not appear tachypneic, though still with decreased breath sounds at both bases--(poor insp effort).  With AM fingerstick down to 108 today, decrease the Lantus to 20 units.  Post-prandials higher with improved PO intake, however, so increase the lispro to 14 units
Pt seen on rounds this afternoon.  Glucoses have been distinctly lower since last night, despite her improved appetite.  Still appears mildly dyspneic, still with decreased air entry at both bases (worse on left) and still unable to pull more than 500 cc on the spirometer.  Given the hypoglycemia this morning on 32 units of Lantus last night, will decrease to 24 units for tonight.  Continue the pre-meal as is
75y Female with PMHx known CAD (diagnostic cath 8/2019 @ [place]), PAD s/p LSFA/Popliteal/TP trunk and Posterior Tibial PTA and stent 8/2019, HTN, HLD, Uncontrolled diabetes with multiple diabetes related complications, osteomyelitis s/p AKA LLE 11/2019.  s/p CABG on 7/22/2020. Day #3  Glucose reading 133,162.194  Recent A1c 11.8%  Recommend; Continue with MDI Basal 35 u and prandial 7 u ac meal plus correction insulin dose
Pt seen on rounds this afternoon and events of the weekend reviewed.  Is now on 35 Lantus, 7 units lispro premeal.  Glucoses were 150-220 yesterday, but decreased to 112/92 today--but PO intake was poorer today.  Still appeared moderately dyspneic.  Had decreased breath sounds and a few rales at the L base.  Also with mild decrease in air entry at the right base, but inspiratory effort was poor, and she can pull only 300 cc on the incentive spirometer.  Given the decrease in pre-breakfast fingerstick to 112 today, to decrease the Lantus to 32 units as a precaution.

## 2020-07-30 NOTE — PROGRESS NOTE ADULT - ASSESSMENT
Plan.      Neurovascular:   - No delirium. Pain well controlled with current regimen.  - Continue oxycodone IR PRN, holding tylenol/percocet for elevated LFTs    Cardiovascular:   - PPM placed 7/24/20 for CHB/asystole  - Hemodynamically stable. HR/BP controlled   - Continue metoprolol 50 q8, amlodipine 10 mg  - CAD: continue ASA, holding statin for transaminitis   - Continue Eliquis for PAD    Respiratory:   -02 Sat stable on room air.   -I encouraged more IS use, and ambulation when able  -CXR PA/lateral with atelectasis    GI:   - Post op transaminitis, improved, RUQ ultrasound showed GB thickening, continue levofloxacin 500 mg q24  -Continue GI PPX with protonix  -PO Diet.    Renal / :  - BUN/Cr stable   -Continue to monitor renal function.  -Monitor I/O's.  - Replete electrolytes PRN  - continue to diurese, cutting back to Lasix 40mg PO QD    Endocrine:    -A1c 11.8, poorly controlled diabetes, endocrine following continue to appreciate recs  -TSH 2.238, no known hx of thyroid disease     Hematologic:  -H/H stable with no obvious signs of bleeding      ID:  -Pt remains afebrile with no elevation in WBC or signs of infection  - RUQ ultrasound showed GB thickening, continue levofloxacin 500 mg q24 (?End date)  -Continue to monitor CBC    Prophylaxis:  -DVT prophylaxis with eliquis   -SCD's    Disposition: [facility]/NH pending insurance approval and bed. Plan.      Neurovascular:   - No delirium. Pain well controlled with current regimen.  - Continue oxycodone IR PRN, holding tylenol/percocet for elevated LFTs    Cardiovascular:   - PPM placed 7/24/20 for CHB/asystole  - Hemodynamically stable. HR/BP controlled   - Continue metoprolol 50 q8, amlodipine 10 mg  - CAD: continue ASA, holding statin for transaminitis   - Continue Eliquis for PAD    Respiratory:   -02 Sat stable on room air.   -I encouraged more IS use, and ambulation when able  -CXR PA/lateral with atelectasis    GI:   - Post op transaminitis, improved, RUQ ultrasound showed GB thickening, continue levofloxacin 500 mg q24  -Continue GI PPX with protonix  -PO Diet.    Renal / :  - BUN/Cr stable   -Continue to monitor renal function.  -Monitor I/O's.  - Replete electrolytes PRN  - continue to diurese, cutting back to Lasix 40mg PO QD    Endocrine:    -A1c 11.8, poorly controlled diabetes, endocrine following continue to appreciate recs  -TSH 2.238, no known hx of thyroid disease     Hematologic:  -H/H stable with no obvious signs of bleeding      ID:  -Pt remains afebrile with no elevation in WBC or signs of infection  - RUQ ultrasound showed GB thickening, continue levofloxacin 500 mg q24- End date 7/31/2020 per Dr. Morrow (7 day course)  -Continue to monitor CBC    Prophylaxis:  -DVT prophylaxis with eliquis   -SCD's    Disposition: [facility] pending insurance approval and bed.

## 2020-07-30 NOTE — PROGRESS NOTE ADULT - SUBJECTIVE AND OBJECTIVE BOX
Patient discussed on morning rounds with Dr. Tavarez    Operation / Date:  7/22 CABG X3 (LIMA-LAD, SVG-Ramus, RCA)  7/24 PPM placement     Surgeon:    Referring Physician:    SUBJECTIVE ASSESSMENT:  75y Female     Hospital Course:    Vital Signs Last 24 Hrs  T(C): 36.9 (30 Jul 2020 09:08), Max: 36.9 (30 Jul 2020 09:08)  T(F): 98.4 (30 Jul 2020 09:08), Max: 98.4 (30 Jul 2020 09:08)  HR: 76 (30 Jul 2020 08:41) (70 - 80)  BP: 117/65 (30 Jul 2020 08:41) (117/65 - 166/72)  BP(mean): 86 (30 Jul 2020 08:41) (86 - 108)  RR: 16 (30 Jul 2020 08:41) (16 - 18)  SpO2: 95% (30 Jul 2020 08:41) (95% - 98%)  I&O's Detail    29 Jul 2020 07:01  -  30 Jul 2020 07:00  --------------------------------------------------------  IN:    Oral Fluid: 540 mL  Total IN: 540 mL    OUT:    Voided: 2600 mL  Total OUT: 2600 mL    Total NET: -2060 mL      30 Jul 2020 07:01  -  30 Jul 2020 11:28  --------------------------------------------------------  IN:    Oral Fluid: 200 mL  Total IN: 200 mL    OUT:    Voided: 250 mL  Total OUT: 250 mL    Total NET: -50 mL          EPICARDIAL WIRES REMOVED: Yes/No.  TIE DOWNS REMOVED: Yes/No.    PHYSICAL EXAM:    General:     Neurological:    Cardiovascular:    Respiratory:    Gastrointestinal:    Extremities:    Vascular:    Incision Sites:    LABS:                        10.4   9.53  )-----------( 303      ( 30 Jul 2020 08:44 )             32.6       COUMADIN:  Yes/No.        DOSE:                  INDICATION:                GOAL INR:    PT/INR - ( 29 Jul 2020 07:06 )   PT: 21.0 sec;   INR: 1.80          PTT - ( 29 Jul 2020 07:06 )  PTT:29.1 sec    07-30    140  |  99  |  10  ----------------------------<  108<H>  4.1   |  29  |  0.55    Ca    9.1      30 Jul 2020 08:44  Mg     1.8     07-30    TPro  6.5  /  Alb  3.2<L>  /  TBili  0.8  /  DBili  x   /  AST  33  /  ALT  93<H>  /  AlkPhos  348<H>  07-30          MEDICATIONS  (STANDING):  amLODIPine   Tablet 10 milliGRAM(s) Oral daily  apixaban 5 milliGRAM(s) Oral every 12 hours  aspirin enteric coated 81 milliGRAM(s) Oral daily  dextrose 50% Injectable 25 Gram(s) IV Push once  furosemide   Injectable 40 milliGRAM(s) IV Push every 8 hours  insulin glargine Injectable (LANTUS) 24 Unit(s) SubCutaneous at bedtime  insulin lispro (HumaLOG) corrective regimen sliding scale   SubCutaneous Before meals and at bedtime  insulin lispro Injectable (HumaLOG) 10 Unit(s) SubCutaneous three times a day before meals  levoFLOXacin  Tablet 500 milliGRAM(s) Oral every 24 hours  metoprolol tartrate 50 milliGRAM(s) Oral every 8 hours  pantoprazole    Tablet 40 milliGRAM(s) Oral before breakfast  polyethylene glycol 3350 17 Gram(s) Oral daily  potassium chloride    Tablet ER 40 milliEquivalent(s) Oral every 12 hours  senna 2 Tablet(s) Oral at bedtime  sodium chloride 0.9% lock flush 3 milliLiter(s) IV Push every 8 hours      Discharge CXR:    Discharge ECHO: Patient discussed on morning rounds with Dr. Tavarez    Operation / Date:  7/22 CABG X3 (LIMA-LAD, SVG-Ramus, RCA)  7/24 PPM placement     Surgeon: Dr. Tavarez    Referring Physician: Wil Hatch    SUBJECTIVE ASSESSMENT:  Patient feels well, denies any acute CP, SOB, dizziness, N/V/D.  She is ambulating with her prosthetic (walked yesterday, not yet today).  She is tolerating PO diet.  She uses her IS, but recognizes that she needs to use it more.  She only pulls ~500mL.  Ready for discharge to NH pending bed/insurance approval.     Hospital Course: 74 y/o female with PMHx of 3VCAD (diagnostic cath 8/2019 @ [place]), HTN, HLD, Uncontrolled Insulin Dependent DM II (Hgb A1C 11.8% on 7/17/2020), PAD s/p LSFA/Popliteal/TP trunk and Posterior Tibial PTA and stent 8/2019, (s/p peripheral angiogram @ [place] 5/2020 with "ballooning and stenting" per patient- on Eliquis), Osteomyelitis s/p AKA LLE 11/2019 who presented to cardiologist Dr. Yepez for follow-up. Patient reported RIBEIRO and was brought in for cardiac cath on 7/17/2020 revealing severe 3VCAD, EF 65%. Cardiothoracic Surgery, Dr. Tavarez, consulted for evaluation for CABG and patient came back for elective surgery.  On 7/22/2020 patient underwent CABGx3 (LIMA-LAD, SVG-Ramus, RCA), EF normal.  In the OR received 2 units PRBC. Intraoperatively she was paced for underlying asystole.  Post operatively she arrived to CTICU on levophed/Vasopressin.  Extubated POD #1.  POD #2 Micra PPM placed with EPS for continued pacing with underlying asystole.  POD #3 LFTs elevated, RUQ U/S done with GB thickening, so patient started on Levaquin and statin was held.  POD #4 diuresed. POD #5 transferred to stepdown unit, lasix increased with goal net negative 2L /day. She has remained stable on the floor POD#6-7, undergoing diuresis and discharge planning to rehab once authorized. Today, POD #8, remains stable.  Ready for [facility] pending bed.       Vital Signs Last 24 Hrs  T(C): 36.9 (30 Jul 2020 09:08), Max: 36.9 (30 Jul 2020 09:08)  T(F): 98.4 (30 Jul 2020 09:08), Max: 98.4 (30 Jul 2020 09:08)  HR: 76 (30 Jul 2020 08:41) (70 - 80)  BP: 117/65 (30 Jul 2020 08:41) (117/65 - 166/72)  BP(mean): 86 (30 Jul 2020 08:41) (86 - 108)  RR: 16 (30 Jul 2020 08:41) (16 - 18)  SpO2: 95% (30 Jul 2020 08:41) (95% - 98%)  I&O's Detail    29 Jul 2020 07:01  -  30 Jul 2020 07:00  --------------------------------------------------------  IN:    Oral Fluid: 540 mL  Total IN: 540 mL    OUT:    Voided: 2600 mL  Total OUT: 2600 mL    Total NET: -2060 mL      30 Jul 2020 07:01  -  30 Jul 2020 11:28  --------------------------------------------------------  IN:    Oral Fluid: 200 mL  Total IN: 200 mL    OUT:    Voided: 250 mL  Total OUT: 250 mL    Total NET: -50 mL        EPICARDIAL WIRES REMOVED: Yes  TIE DOWNS REMOVED: Yes- removed today.     PHYSICAL EXAM:    GEN: NAD, looks comfortable.  Sitting up in the chair.   Psych: Mood appropriate  Neuro: A&Ox3.  No focal deficits.  Moving all extremities.   HEENT: No obvious abnormalities  CV: S1S2, regular, no murmurs appreciated.  No carotid bruits.  No JVD  Lungs: Decreased at bases.  No wheezing, rales or rhonchi  ABD: Soft, non-tender, non-distended.  +Bowel sounds  EXT: Warm and well perfused.  No peripheral edema noted  Musculoskeletal: Moving all extremities with normal ROM, no joint swelling  PV: Pedal pulses palpable  Incision Sites: MSI clean and dry.     LABS:                        10.4   9.53  )-----------( 303      ( 30 Jul 2020 08:44 )             32.6       COUMADIN:  No    PT/INR - ( 29 Jul 2020 07:06 )   PT: 21.0 sec;   INR: 1.80          PTT - ( 29 Jul 2020 07:06 )  PTT:29.1 sec    07-30    140  |  99  |  10  ----------------------------<  108<H>  4.1   |  29  |  0.55    Ca    9.1      30 Jul 2020 08:44  Mg     1.8     07-30    TPro  6.5  /  Alb  3.2<L>  /  TBili  0.8  /  DBili  x   /  AST  33  /  ALT  93<H>  /  AlkPhos  348<H>  07-30          MEDICATIONS  (STANDING):  amLODIPine   Tablet 10 milliGRAM(s) Oral daily  apixaban 5 milliGRAM(s) Oral every 12 hours  aspirin enteric coated 81 milliGRAM(s) Oral daily  dextrose 50% Injectable 25 Gram(s) IV Push once  furosemide   Injectable 40 milliGRAM(s) IV Push every 8 hours  insulin glargine Injectable (LANTUS) 24 Unit(s) SubCutaneous at bedtime  insulin lispro (HumaLOG) corrective regimen sliding scale   SubCutaneous Before meals and at bedtime  insulin lispro Injectable (HumaLOG) 10 Unit(s) SubCutaneous three times a day before meals  levoFLOXacin  Tablet 500 milliGRAM(s) Oral every 24 hours  metoprolol tartrate 50 milliGRAM(s) Oral every 8 hours  pantoprazole    Tablet 40 milliGRAM(s) Oral before breakfast  polyethylene glycol 3350 17 Gram(s) Oral daily  potassium chloride    Tablet ER 40 milliEquivalent(s) Oral every 12 hours  senna 2 Tablet(s) Oral at bedtime  sodium chloride 0.9% lock flush 3 milliLiter(s) IV Push every 8 hours      Discharge CXR: < from: Xray Chest 2 Views PA/Lat (07.29.20 @ 09:58) >  Similar appearance to prior exam 7/28/2020 with mild hypoinflation. Postoperative changes. Minimal focal atelectasis in the lung bases. Cannot exclude small pleural effusions present. No pneumothorax. No acute lung infiltrates.    < end of copied text >      Discharge ECHO: Not needed.

## 2020-07-30 NOTE — DISCHARGE NOTE PROVIDER - HOSPITAL COURSE
74 y/o female with PMHx of 3VCAD (diagnostic cath 8/2019 @ [place]), HTN, HLD, Uncontrolled Insulin Dependent DM II (Hgb A1C 11.8% on 7/17/2020), PAD s/p LSFA/Popliteal/TP trunk and Posterior Tibial PTA and stent 8/2019, (s/p peripheral angiogram @ [place] 5/2020 with "ballooning and stenting" per patient- on Eliquis), Osteomyelitis s/p AKA LLE 11/2019 who presented to cardiologist Dr. Yepez for follow-up. Patient reported RIBEIRO and was brought in for cardiac cath on 7/17/2020 revealing severe 3VCAD, EF 65%. Cardiothoracic Surgery, Dr. Tavarez, consulted for evaluation for CABG and patient came back for elective surgery.  On 7/22/2020 patient underwent CABGx3 (LIMA-LAD, SVG-Ramus, RCA), EF normal.  In the OR received 2 units PRBC. Intraoperatively she was paced for underlying asystole.  Post operatively she arrived to CTICU on levophed/Vasopressin.  Extubated POD #1.  POD #2 Micra PPM placed with EPS for continued pacing with underlying asystole.  POD #3 LFTs elevated, RUQ U/S done with GB thickening, so patient started on Levaquin and statin was held.  POD #4 diuresed. POD #5 transferred to stepdown unit, lasix increased with goal net negative 2L /day. She has remained stable on the floor POD#6-7, undergoing diuresis and discharge planning to rehab once authorized. Today, POD #8, remains stable.  Ready for [facility] pending bed. 74 y/o female with PMHx of 3VCAD (diagnostic cath 8/2019 @ [place]), HTN, HLD, Uncontrolled Insulin Dependent DM II (Hgb A1C 11.8% on 7/17/2020), PAD s/p LSFA/Popliteal/TP trunk and Posterior Tibial PTA and stent 8/2019, (s/p peripheral angiogram @ [place] 5/2020 with "ballooning and stenting" per patient- on Eliquis), Osteomyelitis s/p AKA LLE 11/2019 who presented to cardiologist, Dr. Yepez for follow-up. Patient reported RIBEIRO and was brought in for cardiac cath on 7/17/2020 revealing severe 3VCAD, EF 65%. Cardiothoracic Surgery, Dr. Tavarez, consulted for evaluation for CABG and patient came back for elective surgery.  On 7/22/2020 patient underwent CABGx3 (LIMA-LAD, SVG-Ramus, RCA), EF normal.  In the OR received 2 units PRBC. Intraoperatively was paced for underlying asystole.  Post operatively she arrived to CTICU on levophed/Vasopressin.  Extubated POD #1. POD #2 Micra PPM placed with EPS for continued pacing with underlying asystole.  POD#3 LFTs elevated, RUQ U/S done with GB thickening, so patient started on Levaquin and statin was held.  POD #4 diuresed. POD #5 transferred to stepdown unit, lasix increased with goal net negative 2L /day. She has remained stable on the floor POD#6-7, undergoing diuresis and awaiting authorization to rehab. Today, POD #9, remains stable and per Dr. Tavarez, deemed stable for discharge today.         35 minutes was spent with the patient reviewing the discharge material including medications, follow up appointments, recovery, concerning symptoms, and how to contact their health care providers if they have questions

## 2020-07-30 NOTE — DISCHARGE NOTE PROVIDER - NSDCFUADDINST_GEN_ALL_CORE_FT
-Please follow up with Dr. Tavarez or Dr. Anaya (Dr. Tavarez's partner) on 8/11/2020 @ 12:45pm.  The office is located at [facility], [facility] [address] [place] [address], [facility], 4th floor. Call us with any questions #[phone].    -Please follow up with Dr. Yepez on 8/17/2020 @ 10:15am at [address] [facility].  Phone number listed in the follow up section.    -Please follow-up with Dr. Steiner (PCP) via telehealth to manage your diabetes.  Please CALL the office to schedule an appointment at [phone].      -While you were here,  your liver function tests were slightly abnormal (they have since improved).  But, due to this, an ultrasound of your gallbladder was performed showing "wall thickening" of the gallbladder.  For this you were given a 7-day course of oral antibiotics, to end on 7/31/2020.  In addition, your statin (Lipitor) was held as well as your Tylenol.  If the liver function tests continue to improve after discharge, these medications can be resumed by your out-patient physician.      -Walk daily as tolerated and use your incentive spirometer every hour.    -No driving or strenuous activity/exercise until cleared by your surgeon.    -Gently clean your incisions with anti-bacterial soap and water, pat dry.  You may leave them open to air.    -Call your doctor if you have shortness of breath, chest pain not relieved by pain medication, dizziness, fever >101.5, or increased redness or drainage from incisions. -While you were here,  your liver function tests were slightly abnormal (they have since improved).  But, due to this, an ultrasound of your gallbladder was performed showing "wall thickening" of the gallbladder.  For this you were given a 7-day course of oral antibiotics.  In addition, your statin (Lipitor) was held as well as your Tylenol.  If the liver function tests continue to improve after discharge, these medications can be resumed by your out-patient physician.      -Walk daily as tolerated and use your incentive spirometer every hour.    -No driving or strenuous activity/exercise until cleared by your surgeon.    -Gently clean your incisions with anti-bacterial soap and water, pat dry.  You may leave them open to air.    -Call your doctor if you have shortness of breath, chest pain not relieved by pain medication, dizziness, fever >101.5, or increased redness or drainage from incisions.

## 2020-07-31 ENCOUNTER — TRANSCRIPTION ENCOUNTER (OUTPATIENT)
Age: 75
End: 2020-07-31

## 2020-07-31 VITALS — DIASTOLIC BLOOD PRESSURE: 63 MMHG | SYSTOLIC BLOOD PRESSURE: 139 MMHG | TEMPERATURE: 98 F

## 2020-07-31 DIAGNOSIS — Z86.79 PERSONAL HISTORY OF OTHER DISEASES OF THE CIRCULATORY SYSTEM: ICD-10-CM

## 2020-07-31 DIAGNOSIS — Z01.818 ENCOUNTER FOR OTHER PREPROCEDURAL EXAMINATION: ICD-10-CM

## 2020-07-31 DIAGNOSIS — Z87.891 PERSONAL HISTORY OF NICOTINE DEPENDENCE: ICD-10-CM

## 2020-07-31 DIAGNOSIS — Z86.39 PERSONAL HISTORY OF OTHER ENDOCRINE, NUTRITIONAL AND METABOLIC DISEASE: ICD-10-CM

## 2020-07-31 DIAGNOSIS — Z87.39 PERSONAL HISTORY OF OTHER DISEASES OF THE MUSCULOSKELETAL SYSTEM AND CONNECTIVE TISSUE: ICD-10-CM

## 2020-07-31 LAB
ANION GAP SERPL CALC-SCNC: 11 MMOL/L — SIGNIFICANT CHANGE UP (ref 5–17)
BUN SERPL-MCNC: 12 MG/DL — SIGNIFICANT CHANGE UP (ref 7–23)
CALCIUM SERPL-MCNC: 8.4 MG/DL — SIGNIFICANT CHANGE UP (ref 8.4–10.5)
CHLORIDE SERPL-SCNC: 100 MMOL/L — SIGNIFICANT CHANGE UP (ref 96–108)
CO2 SERPL-SCNC: 25 MMOL/L — SIGNIFICANT CHANGE UP (ref 22–31)
CREAT SERPL-MCNC: 0.5 MG/DL — SIGNIFICANT CHANGE UP (ref 0.5–1.3)
GLUCOSE BLDC GLUCOMTR-MCNC: 194 MG/DL — HIGH (ref 70–99)
GLUCOSE BLDC GLUCOMTR-MCNC: 251 MG/DL — HIGH (ref 70–99)
GLUCOSE SERPL-MCNC: 197 MG/DL — HIGH (ref 70–99)
HCT VFR BLD CALC: 28.8 % — LOW (ref 34.5–45)
HGB BLD-MCNC: 9.4 G/DL — LOW (ref 11.5–15.5)
MAGNESIUM SERPL-MCNC: 1.7 MG/DL — SIGNIFICANT CHANGE UP (ref 1.6–2.6)
MCHC RBC-ENTMCNC: 28 PG — SIGNIFICANT CHANGE UP (ref 27–34)
MCHC RBC-ENTMCNC: 32.6 GM/DL — SIGNIFICANT CHANGE UP (ref 32–36)
MCV RBC AUTO: 85.7 FL — SIGNIFICANT CHANGE UP (ref 80–100)
NRBC # BLD: 0 /100 WBCS — SIGNIFICANT CHANGE UP (ref 0–0)
PLATELET # BLD AUTO: 332 K/UL — SIGNIFICANT CHANGE UP (ref 150–400)
POTASSIUM SERPL-MCNC: 3.9 MMOL/L — SIGNIFICANT CHANGE UP (ref 3.5–5.3)
POTASSIUM SERPL-SCNC: 3.9 MMOL/L — SIGNIFICANT CHANGE UP (ref 3.5–5.3)
RBC # BLD: 3.36 M/UL — LOW (ref 3.8–5.2)
RBC # FLD: 16 % — HIGH (ref 10.3–14.5)
SODIUM SERPL-SCNC: 136 MMOL/L — SIGNIFICANT CHANGE UP (ref 135–145)
WBC # BLD: 9.25 K/UL — SIGNIFICANT CHANGE UP (ref 3.8–10.5)
WBC # FLD AUTO: 9.25 K/UL — SIGNIFICANT CHANGE UP (ref 3.8–10.5)

## 2020-07-31 RX ORDER — MAGNESIUM OXIDE 400 MG ORAL TABLET 241.3 MG
800 TABLET ORAL ONCE
Refills: 0 | Status: COMPLETED | OUTPATIENT
Start: 2020-07-31 | End: 2020-07-31

## 2020-07-31 RX ORDER — METOPROLOL TARTRATE 50 MG
1 TABLET ORAL
Qty: 0 | Refills: 0 | DISCHARGE
Start: 2020-07-31

## 2020-07-31 RX ORDER — METOPROLOL TARTRATE 50 MG
50 TABLET ORAL EVERY 6 HOURS
Refills: 0 | Status: DISCONTINUED | OUTPATIENT
Start: 2020-07-31 | End: 2020-07-31

## 2020-07-31 RX ORDER — METOPROLOL TARTRATE 50 MG
1 TABLET ORAL
Qty: 0 | Refills: 0 | DISCHARGE

## 2020-07-31 RX ADMIN — Medication 13 UNIT(S): at 11:49

## 2020-07-31 RX ADMIN — SODIUM CHLORIDE 3 MILLILITER(S): 9 INJECTION INTRAMUSCULAR; INTRAVENOUS; SUBCUTANEOUS at 12:30

## 2020-07-31 RX ADMIN — Medication 50 MILLIGRAM(S): at 01:32

## 2020-07-31 RX ADMIN — AMLODIPINE BESYLATE 10 MILLIGRAM(S): 2.5 TABLET ORAL at 06:47

## 2020-07-31 RX ADMIN — SODIUM CHLORIDE 3 MILLILITER(S): 9 INJECTION INTRAMUSCULAR; INTRAVENOUS; SUBCUTANEOUS at 05:54

## 2020-07-31 RX ADMIN — Medication 2: at 07:16

## 2020-07-31 RX ADMIN — MAGNESIUM OXIDE 400 MG ORAL TABLET 800 MILLIGRAM(S): 241.3 TABLET ORAL at 10:27

## 2020-07-31 RX ADMIN — Medication 50 MILLIGRAM(S): at 11:43

## 2020-07-31 RX ADMIN — POLYETHYLENE GLYCOL 3350 17 GRAM(S): 17 POWDER, FOR SOLUTION ORAL at 11:43

## 2020-07-31 RX ADMIN — Medication 13 UNIT(S): at 07:16

## 2020-07-31 RX ADMIN — Medication 6: at 11:49

## 2020-07-31 RX ADMIN — Medication 40 MILLIGRAM(S): at 07:17

## 2020-07-31 RX ADMIN — Medication 81 MILLIGRAM(S): at 12:32

## 2020-07-31 RX ADMIN — APIXABAN 5 MILLIGRAM(S): 2.5 TABLET, FILM COATED ORAL at 06:47

## 2020-07-31 RX ADMIN — PANTOPRAZOLE SODIUM 40 MILLIGRAM(S): 20 TABLET, DELAYED RELEASE ORAL at 06:47

## 2020-07-31 NOTE — DISCHARGE NOTE NURSING/CASE MANAGEMENT/SOCIAL WORK - NSDPFAC_GEN_ALL_CORE
[facility] [address] [place] [id number]; [phone]

## 2020-07-31 NOTE — PROGRESS NOTE ADULT - ASSESSMENT
-Please follow up with Dr. Tavarez or Dr. Anaya (Dr. Tavarez's partner) on 8/11/2020 @ 12:45pm.  The office is located at [facility], [facility] [address] [place] [address], [facility], 4th floor. Call us with any questions #[phone].  -Please follow up with Dr. Yepez on 8/17/2020 @ 10:15am at [address] [facility].  Phone number listed in the follow up section.  -Please follow-up with Dr. Steiner (PCP) via telehealth to manage your diabetes.  Please CALL the office to schedule an appointment at [phone].  No heavy lifting/straining, Showering allowed, Walking - Indoors allowed, Walking - Outdoors allowed  -While you were here,  your liver function tests were slightly abnormal (they have since improved).  But, due to this, an ultrasound of your gallbladder was performed showing "wall thickening" of the gallbladder.  For this you were given a 7-day course of oral antibiotics.  In addition, your statin (Lipitor) was held as well as your Tylenol.  If the liver function tests continue to improve after discharge, these medications can be resumed by your out-patient physician.      -Walk daily as tolerated and use your incentive spirometer every hour.    -No driving or strenuous activity/exercise until cleared by your surgeon.    -Gently clean your incisions with anti-bacterial soap and water, pat dry.  You may leave them open to air.    -Call your doctor if you have shortness of breath, chest pain not relieved by pain medication, dizziness, fever >101.5, or increased redness or drainage from incisions.

## 2020-07-31 NOTE — DISCHARGE NOTE NURSING/CASE MANAGEMENT/SOCIAL WORK - NSDCFUADDAPPT_GEN_ALL_CORE_FT
-Please follow up with Dr. Tavarez or Dr. Anaya (Dr. Tavarez's partner) on 8/11/2020 @ 12:45pm.  The office is located at [facility], [address] [place] [address], [facility], 4th floor. Call us with any questions #[phone].  -Please follow up with Dr. Yepez on 8/17/2020 @ 10:15am at [address] [facility].  Phone number listed in the follow up section.  -Please follow-up with Dr. Steiner (PCP) via telehealth to manage your diabetes.  Please CALL the office to schedule an appointment at [phone].

## 2020-07-31 NOTE — DISCHARGE NOTE NURSING/CASE MANAGEMENT/SOCIAL WORK - PATIENT PORTAL LINK FT
You can access the FollowMyHealth Patient Portal offered by [facility] by registering at the following website: [email or web address]. By joining [employer]’s FollowMyHealth portal, you will also be able to view your health information using other applications (apps) compatible with our system.

## 2020-07-31 NOTE — PROGRESS NOTE ADULT - SUBJECTIVE AND OBJECTIVE BOX
Patient discussed on morning rounds with Dr. Tavarez    Operation / Date: 7/22/20 CABG X3 (LIMA-LAD, SVG-Ramus, RCA)  7/24/20 PPM placement     Surgeon: Dr. Tavarez    Referring Physician: Dr. Yepez    SUBJECTIVE ASSESSMENT   75y Female seen and examined bedside, not offering any acute complaints. She is ambulating with her prosthesis, and with physical therapy. She is using IS pulling 500cc, and is moving her bowels daily. Denies chest pain, SOB, palpitations, N/V/D, abdominal pain, dizziness, fever or chills. Patient is ambulating, tolerating PO diet, and voiding spontaneously.     AND HOSPITAL COURSE:  76 y/o female with PMHx of 3VCAD (diagnostic cath 8/2019 @ [facility]), HTN, HLD, Uncontrolled Insulin Dependent DM II (Hgb A1C 11.8% on 7/17/2020), PAD s/p LSFA/Popliteal/TP trunk and Posterior Tibial PTA and stent 8/2019, (s/p peripheral angiogram @ [facility] 5/2020 with "ballooning and stenting" per patient- on Eliquis), Osteomyelitis s/p AKA LLE 11/2019 who presented to cardiologist, Dr. Yepez for follow-up. Patient reported RIBEIRO and was brought in for cardiac cath on 7/17/2020 revealing severe 3VCAD, EF 65%. Cardiothoracic Surgery, Dr. Tavarez, consulted for evaluation for CABG and patient came back for elective surgery.  On 7/22/2020 patient underwent CABGx3 (LIMA-LAD, SVG-Ramus, RCA), EF normal.  In the OR received 2 units PRBC. Intraoperatively was paced for underlying asystole.  Post operatively she arrived to [facility] on levophed/Vasopressin.  Extubated POD #1. POD #2 Micra PPM placed with EPS for continued pacing with underlying asystole.  POD#3 LFTs elevated, RUQ U/S done with GB thickening, so patient started on Levaquin and statin was held.  POD #4 diuresed. POD #5 transferred to stepdown unit, lasix increased with goal net negative 2L /day. She has remained stable on the floor POD#6-7, undergoing diuresis and awaiting authorization to rehab. Today, POD #9, remains stable and per Dr. Tavarez, deemed stable for discharge today.     35 minutes was spent with the patient reviewing the discharge material including medications, follow up appointments, recovery, concerning symptoms, and how to contact their health care providers if they have questions    Vital Signs Last 24 Hrs  T(C): 36.7 (31 Jul 2020 09:02), Max: 37.1 (30 Jul 2020 14:02)  T(F): 98.1 (31 Jul 2020 09:02), Max: 98.7 (30 Jul 2020 14:02)  HR: 74 (31 Jul 2020 11:00) (70 - 76)  BP: 152/60 (31 Jul 2020 11:00) (131/60 - 159/70)  BP(mean): 98 (31 Jul 2020 11:00) (86 - 102)  RR: 18 (31 Jul 2020 11:00) (16 - 18)  SpO2: 99% (31 Jul 2020 11:00) (96% - 99%)    EPICARDIAL WIRES REMOVED: Yes  TIE DOWNS REMOVED: Yes    PHYSICAL EXAM:    General: Patient lying comfortably in bed, no acute distress     Neurological: Alert and oriented. No focal neurological deficits     Cardiovascular: S1S2, RRR, no murmurs appreciated on exam     Respiratory: Clear to ausculation bilaterally, no wheezes, rales or rhonchi    Gastrointestinal: Abdomen soft, non tender, non distended     Extremities: Warm and well perfused. No peripheral edema or calf tenderness RLE.     Vascular: Peripheral pulses palpable bilaterally    Incision Sites: MSI c/d/i, no sternal click.       LABS:                        9.4    9.25  )-----------( 332      ( 31 Jul 2020 07:36 )             28.8       COUMADIN:  No.         07-31    136  |  100  |  12  ----------------------------<  197<H>  3.9   |  25  |  0.50    Ca    8.4      31 Jul 2020 07:36  Mg     1.7     07-31    TPro  6.5  /  Alb  3.2<L>  /  TBili  0.8  /  DBili  x   /  AST  33  /  ALT  93<H>  /  AlkPhos  348<H>  07-30          Discharge CXR:  < from: Xray Chest 2 Views PA/Lat (07.29.20 @ 09:58) >    EXAM:  XR CHEST PA LAT 2V                          PROCEDURE DATE:  07/29/2020          INTERPRETATION:  Chest 2 views    HISTORY: Postop cardiothoracic surgery follow-up exam    IMPRESSION:    Similar appearance to prior exam 7/28/2020 with mild hypoinflation. Postoperative changes. Minimal focal atelectasis in the lung bases. Cannot exclude small pleural effusions present. No pneumothorax. No acute lung infiltrates.      < end of copied text >      Discharge ECHO:  not indicated

## 2020-07-31 NOTE — PROGRESS NOTE ADULT - REASON FOR ADMISSION
CAD
CAD  s/p CABG  s/p MICRA AV insertion for high degree AV block
CAD

## 2020-08-01 ENCOUNTER — TRANSCRIPTION ENCOUNTER (OUTPATIENT)
Age: 75
End: 2020-08-01

## 2020-08-03 ENCOUNTER — TRANSCRIPTION ENCOUNTER (OUTPATIENT)
Age: 75
End: 2020-08-03

## 2020-08-05 RX ORDER — PANTOPRAZOLE 40 MG/1
40 TABLET, DELAYED RELEASE ORAL DAILY
Qty: 30 | Refills: 2 | Status: ACTIVE | COMMUNITY

## 2020-08-05 RX ORDER — APIXABAN 5 MG/1
5 TABLET, FILM COATED ORAL
Qty: 60 | Refills: 0 | Status: ACTIVE | COMMUNITY

## 2020-08-05 RX ORDER — METOPROLOL TARTRATE 100 MG/1
100 TABLET, FILM COATED ORAL
Qty: 60 | Refills: 3 | Status: ACTIVE | COMMUNITY

## 2020-08-05 RX ORDER — FUROSEMIDE 40 MG/1
40 TABLET ORAL DAILY
Qty: 30 | Refills: 0 | Status: ACTIVE | COMMUNITY

## 2020-08-05 RX ORDER — ASPIRIN 81 MG
81 TABLET, DELAYED RELEASE (ENTERIC COATED) ORAL DAILY
Qty: 30 | Refills: 5 | Status: ACTIVE | COMMUNITY

## 2020-08-05 RX ORDER — MULTIVITAMIN WITH FOLIC ACID 400 MCG
TABLET ORAL DAILY
Refills: 0 | Status: ACTIVE | COMMUNITY

## 2020-08-05 RX ORDER — AMLODIPINE BESYLATE 10 MG/1
10 TABLET ORAL DAILY
Qty: 30 | Refills: 3 | Status: ACTIVE | COMMUNITY

## 2020-08-05 RX ORDER — INSULIN LISPRO 100 [IU]/ML
100 INJECTION, SOLUTION INTRAVENOUS; SUBCUTANEOUS 3 TIMES DAILY
Refills: 0 | Status: ACTIVE | COMMUNITY

## 2020-08-05 RX ORDER — COLD-HOT PACK
125 MCG EACH MISCELLANEOUS
Qty: 90 | Refills: 1 | Status: ACTIVE | COMMUNITY

## 2020-08-05 RX ORDER — OXYCODONE 5 MG/1
5 TABLET ORAL
Qty: 30 | Refills: 0 | Status: ACTIVE | COMMUNITY

## 2020-08-05 RX ORDER — INSULIN GLARGINE 100 [IU]/ML
100 INJECTION, SOLUTION SUBCUTANEOUS AT BEDTIME
Refills: 0 | Status: ACTIVE | COMMUNITY

## 2020-08-05 RX ORDER — POTASSIUM CHLORIDE 1500 MG/1
20 TABLET, EXTENDED RELEASE ORAL DAILY
Qty: 30 | Refills: 0 | Status: ACTIVE | COMMUNITY

## 2020-08-07 ENCOUNTER — TRANSCRIPTION ENCOUNTER (OUTPATIENT)
Age: 75
End: 2020-08-07

## 2020-08-11 ENCOUNTER — APPOINTMENT (OUTPATIENT)
Dept: CARDIOTHORACIC SURGERY | Facility: CLINIC | Age: 75
End: 2020-08-11

## 2020-08-12 ENCOUNTER — TRANSCRIPTION ENCOUNTER (OUTPATIENT)
Age: 75
End: 2020-08-12

## 2020-08-14 ENCOUNTER — TRANSCRIPTION ENCOUNTER (OUTPATIENT)
Age: 75
End: 2020-08-14

## 2020-08-17 ENCOUNTER — TRANSCRIPTION ENCOUNTER (OUTPATIENT)
Age: 75
End: 2020-08-17

## 2020-08-17 ENCOUNTER — APPOINTMENT (OUTPATIENT)
Dept: CARDIOTHORACIC SURGERY | Facility: CLINIC | Age: 75
End: 2020-08-17
Payer: MEDICARE

## 2020-08-21 ENCOUNTER — OUTPATIENT (OUTPATIENT)
Dept: OUTPATIENT SERVICES | Facility: HOSPITAL | Age: 75
LOS: 1 days | End: 2020-08-21
Payer: MEDICARE

## 2020-08-21 ENCOUNTER — APPOINTMENT (OUTPATIENT)
Dept: CARDIOTHORACIC SURGERY | Facility: CLINIC | Age: 75
End: 2020-08-21
Payer: MEDICARE

## 2020-08-21 VITALS
RESPIRATION RATE: 17 BRPM | HEART RATE: 73 BPM | BODY MASS INDEX: 23.22 KG/M2 | WEIGHT: 136 LBS | DIASTOLIC BLOOD PRESSURE: 77 MMHG | OXYGEN SATURATION: 98 % | HEIGHT: 64 IN | SYSTOLIC BLOOD PRESSURE: 182 MMHG | TEMPERATURE: 96 F

## 2020-08-21 VITALS
TEMPERATURE: 96 F | RESPIRATION RATE: 17 BRPM | DIASTOLIC BLOOD PRESSURE: 77 MMHG | SYSTOLIC BLOOD PRESSURE: 182 MMHG | HEART RATE: 73 BPM | HEIGHT: 64 IN | WEIGHT: 136 LBS | BODY MASS INDEX: 23.22 KG/M2 | OXYGEN SATURATION: 97 %

## 2020-08-21 DIAGNOSIS — Z09 ENCOUNTER FOR FOLLOW-UP EXAMINATION AFTER COMPLETED TREATMENT FOR CONDITIONS OTHER THAN MALIGNANT NEOPLASM: ICD-10-CM

## 2020-08-21 DIAGNOSIS — I25.10 ATHEROSCLEROTIC HEART DISEASE OF NATIVE CORONARY ARTERY W/OUT ANGINA PECTORIS: ICD-10-CM

## 2020-08-21 DIAGNOSIS — Z95.1 PRESENCE OF AORTOCORONARY BYPASS GRAFT: ICD-10-CM

## 2020-08-21 PROCEDURE — 99024 POSTOP FOLLOW-UP VISIT: CPT

## 2020-08-21 PROCEDURE — 71045 X-RAY EXAM CHEST 1 VIEW: CPT | Mod: 26

## 2020-08-21 PROCEDURE — 71045 X-RAY EXAM CHEST 1 VIEW: CPT

## 2020-08-27 ENCOUNTER — TRANSCRIPTION ENCOUNTER (OUTPATIENT)
Age: 75
End: 2020-08-27

## 2020-08-31 ENCOUNTER — TRANSCRIPTION ENCOUNTER (OUTPATIENT)
Age: 75
End: 2020-08-31

## 2020-09-10 PROCEDURE — 83605 ASSAY OF LACTIC ACID: CPT

## 2020-09-10 PROCEDURE — 82803 BLOOD GASES ANY COMBINATION: CPT

## 2020-09-10 PROCEDURE — 86901 BLOOD TYPING SEROLOGIC RH(D): CPT

## 2020-09-10 PROCEDURE — 85025 COMPLETE CBC W/AUTO DIFF WBC: CPT

## 2020-09-10 PROCEDURE — 85027 COMPLETE CBC AUTOMATED: CPT

## 2020-09-10 PROCEDURE — 80053 COMPREHEN METABOLIC PANEL: CPT

## 2020-09-10 PROCEDURE — C1786: CPT

## 2020-09-10 PROCEDURE — C1894: CPT

## 2020-09-10 PROCEDURE — 71046 X-RAY EXAM CHEST 2 VIEWS: CPT

## 2020-09-10 PROCEDURE — 87635 SARS-COV-2 COVID-19 AMP PRB: CPT

## 2020-09-10 PROCEDURE — 84132 ASSAY OF SERUM POTASSIUM: CPT

## 2020-09-10 PROCEDURE — 82962 GLUCOSE BLOOD TEST: CPT

## 2020-09-10 PROCEDURE — 80048 BASIC METABOLIC PNL TOTAL CA: CPT

## 2020-09-10 PROCEDURE — 93005 ELECTROCARDIOGRAM TRACING: CPT

## 2020-09-10 PROCEDURE — 36430 TRANSFUSION BLD/BLD COMPNT: CPT

## 2020-09-10 PROCEDURE — C1769: CPT

## 2020-09-10 PROCEDURE — P9045: CPT

## 2020-09-10 PROCEDURE — 97162 PT EVAL MOD COMPLEX 30 MIN: CPT

## 2020-09-10 PROCEDURE — 82248 BILIRUBIN DIRECT: CPT

## 2020-09-10 PROCEDURE — 85610 PROTHROMBIN TIME: CPT

## 2020-09-10 PROCEDURE — 82330 ASSAY OF CALCIUM: CPT

## 2020-09-10 PROCEDURE — 94002 VENT MGMT INPAT INIT DAY: CPT

## 2020-09-10 PROCEDURE — C1889: CPT

## 2020-09-10 PROCEDURE — 97116 GAIT TRAINING THERAPY: CPT

## 2020-09-10 PROCEDURE — 83735 ASSAY OF MAGNESIUM: CPT

## 2020-09-10 PROCEDURE — 71045 X-RAY EXAM CHEST 1 VIEW: CPT

## 2020-09-10 PROCEDURE — 36415 COLL VENOUS BLD VENIPUNCTURE: CPT

## 2020-09-10 PROCEDURE — 85730 THROMBOPLASTIN TIME PARTIAL: CPT

## 2020-09-10 PROCEDURE — 97530 THERAPEUTIC ACTIVITIES: CPT

## 2020-09-10 PROCEDURE — 76705 ECHO EXAM OF ABDOMEN: CPT

## 2020-09-10 PROCEDURE — P9016: CPT

## 2020-09-10 PROCEDURE — 86923 COMPATIBILITY TEST ELECTRIC: CPT

## 2020-09-10 PROCEDURE — 86850 RBC ANTIBODY SCREEN: CPT

## 2020-09-10 PROCEDURE — 84100 ASSAY OF PHOSPHORUS: CPT

## 2020-09-10 PROCEDURE — 84295 ASSAY OF SERUM SODIUM: CPT
